# Patient Record
Sex: MALE | Race: WHITE | NOT HISPANIC OR LATINO | Employment: OTHER | URBAN - METROPOLITAN AREA
[De-identification: names, ages, dates, MRNs, and addresses within clinical notes are randomized per-mention and may not be internally consistent; named-entity substitution may affect disease eponyms.]

---

## 2022-10-13 ENCOUNTER — HOSPITAL ENCOUNTER (OUTPATIENT)
Dept: RADIOLOGY | Facility: MEDICAL CENTER | Age: 62
End: 2022-10-13

## 2022-10-13 ENCOUNTER — APPOINTMENT (OUTPATIENT)
Dept: RADIOLOGY | Facility: MEDICAL CENTER | Age: 62
DRG: 141 | End: 2022-10-13
Attending: SURGERY
Payer: COMMERCIAL

## 2022-10-13 ENCOUNTER — APPOINTMENT (OUTPATIENT)
Dept: RADIOLOGY | Facility: MEDICAL CENTER | Age: 62
DRG: 141 | End: 2022-10-13
Attending: NURSE PRACTITIONER
Payer: COMMERCIAL

## 2022-10-13 ENCOUNTER — HOSPITAL ENCOUNTER (INPATIENT)
Facility: MEDICAL CENTER | Age: 62
LOS: 21 days | DRG: 141 | End: 2022-11-03
Attending: EMERGENCY MEDICINE | Admitting: SURGERY
Payer: COMMERCIAL

## 2022-10-13 ENCOUNTER — APPOINTMENT (OUTPATIENT)
Dept: RADIOLOGY | Facility: MEDICAL CENTER | Age: 62
DRG: 141 | End: 2022-10-13
Attending: EMERGENCY MEDICINE
Payer: COMMERCIAL

## 2022-10-13 DIAGNOSIS — S02.2XXA CLOSED FRACTURE OF NASAL BONE, INITIAL ENCOUNTER: ICD-10-CM

## 2022-10-13 DIAGNOSIS — S62.102A WRIST FRACTURE, BILATERAL, CLOSED, INITIAL ENCOUNTER: ICD-10-CM

## 2022-10-13 DIAGNOSIS — S02.85XA CLOSED FRACTURE OF ORBITAL WALL, INITIAL ENCOUNTER (HCC): ICD-10-CM

## 2022-10-13 DIAGNOSIS — S12.290A: Primary | ICD-10-CM

## 2022-10-13 DIAGNOSIS — S12.200A: ICD-10-CM

## 2022-10-13 DIAGNOSIS — S62.101A WRIST FRACTURE, BILATERAL, CLOSED, INITIAL ENCOUNTER: ICD-10-CM

## 2022-10-13 DIAGNOSIS — S02.92XA MULTIPLE FACIAL FRACTURES, CLOSED, INITIAL ENCOUNTER (HCC): ICD-10-CM

## 2022-10-13 PROBLEM — S52.501A CLOSED FRACTURE DISTAL RADIUS AND ULNA, RIGHT, INITIAL ENCOUNTER: Status: ACTIVE | Noted: 2022-10-13

## 2022-10-13 PROBLEM — S52.601A CLOSED FRACTURE DISTAL RADIUS AND ULNA, RIGHT, INITIAL ENCOUNTER: Status: ACTIVE | Noted: 2022-10-13

## 2022-10-13 PROBLEM — S01.511A LIP LACERATION: Status: ACTIVE | Noted: 2022-10-13

## 2022-10-13 PROBLEM — T14.90XA TRAUMA: Status: ACTIVE | Noted: 2022-10-13

## 2022-10-13 PROBLEM — Z53.09 CONTRAINDICATION TO DEEP VEIN THROMBOSIS (DVT) PROPHYLAXIS: Status: ACTIVE | Noted: 2022-10-13

## 2022-10-13 PROBLEM — S63.259A FINGER DISLOCATION, INITIAL ENCOUNTER: Status: ACTIVE | Noted: 2022-10-13

## 2022-10-13 PROBLEM — S52.502A TRAUMATIC CLOSED DISPLACED FRACTURE OF DISTAL END OF RADIUS AND ULNA, LEFT, INITIAL ENCOUNTER: Status: ACTIVE | Noted: 2022-10-13

## 2022-10-13 PROBLEM — S52.602A TRAUMATIC CLOSED DISPLACED FRACTURE OF DISTAL END OF RADIUS AND ULNA, LEFT, INITIAL ENCOUNTER: Status: ACTIVE | Noted: 2022-10-13

## 2022-10-13 PROCEDURE — 99152 MOD SED SAME PHYS/QHP 5/>YRS: CPT

## 2022-10-13 PROCEDURE — 94799 UNLISTED PULMONARY SVC/PX: CPT

## 2022-10-13 PROCEDURE — 96375 TX/PRO/DX INJ NEW DRUG ADDON: CPT

## 2022-10-13 PROCEDURE — 0PSJXZZ REPOSITION LEFT RADIUS, EXTERNAL APPROACH: ICD-10-PCS | Performed by: EMERGENCY MEDICINE

## 2022-10-13 PROCEDURE — 302214 INTUBATION BOX: Performed by: EMERGENCY MEDICINE

## 2022-10-13 PROCEDURE — 29125 APPL SHORT ARM SPLINT STATIC: CPT

## 2022-10-13 PROCEDURE — 70498 CT ANGIOGRAPHY NECK: CPT

## 2022-10-13 PROCEDURE — 700105 HCHG RX REV CODE 258: Performed by: SPECIALIST

## 2022-10-13 PROCEDURE — 700111 HCHG RX REV CODE 636 W/ 250 OVERRIDE (IP): Performed by: SPECIALIST

## 2022-10-13 PROCEDURE — 99153 MOD SED SAME PHYS/QHP EA: CPT

## 2022-10-13 PROCEDURE — 99292 CRITICAL CARE ADDL 30 MIN: CPT | Performed by: SURGERY

## 2022-10-13 PROCEDURE — 305948 HCHG GREEN TRAUMA ACT PRE-NOTIFY NO CC

## 2022-10-13 PROCEDURE — 700111 HCHG RX REV CODE 636 W/ 250 OVERRIDE (IP): Performed by: EMERGENCY MEDICINE

## 2022-10-13 PROCEDURE — 700111 HCHG RX REV CODE 636 W/ 250 OVERRIDE (IP)

## 2022-10-13 PROCEDURE — 99291 CRITICAL CARE FIRST HOUR: CPT | Performed by: SURGERY

## 2022-10-13 PROCEDURE — 73200 CT UPPER EXTREMITY W/O DYE: CPT | Mod: LT

## 2022-10-13 PROCEDURE — 700101 HCHG RX REV CODE 250: Performed by: SPECIALIST

## 2022-10-13 PROCEDURE — 73200 CT UPPER EXTREMITY W/O DYE: CPT | Mod: RT

## 2022-10-13 PROCEDURE — 25605 CLTX DST RDL FX/EPHYS SEP W/: CPT

## 2022-10-13 PROCEDURE — 304217 HCHG IRRIGATION SYSTEM

## 2022-10-13 PROCEDURE — 770001 HCHG ROOM/CARE - MED/SURG/GYN PRIV*

## 2022-10-13 PROCEDURE — 0PSHXZZ REPOSITION RIGHT RADIUS, EXTERNAL APPROACH: ICD-10-PCS | Performed by: EMERGENCY MEDICINE

## 2022-10-13 PROCEDURE — 99285 EMERGENCY DEPT VISIT HI MDM: CPT

## 2022-10-13 PROCEDURE — 302874 HCHG BANDAGE ACE 2 OR 3""

## 2022-10-13 PROCEDURE — 303747 HCHG EXTRA SUTURE

## 2022-10-13 PROCEDURE — 0JQ10ZZ REPAIR FACE SUBCUTANEOUS TISSUE AND FASCIA, OPEN APPROACH: ICD-10-PCS | Performed by: EMERGENCY MEDICINE

## 2022-10-13 PROCEDURE — 304999 HCHG REPAIR-SIMPLE/INTERMED LEVEL 1

## 2022-10-13 PROCEDURE — 96374 THER/PROPH/DIAG INJ IV PUSH: CPT

## 2022-10-13 PROCEDURE — 700117 HCHG RX CONTRAST REV CODE 255: Performed by: NURSE PRACTITIONER

## 2022-10-13 RX ORDER — PROPOFOL 10 MG/ML
200 INJECTION, EMULSION INTRAVENOUS ONCE
Status: DISPENSED | OUTPATIENT
Start: 2022-10-13 | End: 2022-10-14

## 2022-10-13 RX ORDER — SODIUM CHLORIDE, SODIUM LACTATE, POTASSIUM CHLORIDE, CALCIUM CHLORIDE 600; 310; 30; 20 MG/100ML; MG/100ML; MG/100ML; MG/100ML
INJECTION, SOLUTION INTRAVENOUS CONTINUOUS
Status: DISCONTINUED | OUTPATIENT
Start: 2022-10-13 | End: 2022-10-22

## 2022-10-13 RX ORDER — ONDANSETRON 4 MG/1
4 TABLET, ORALLY DISINTEGRATING ORAL EVERY 4 HOURS PRN
Status: DISCONTINUED | OUTPATIENT
Start: 2022-10-13 | End: 2022-11-03 | Stop reason: HOSPADM

## 2022-10-13 RX ORDER — PROPOFOL 10 MG/ML
INJECTION, EMULSION INTRAVENOUS
Status: COMPLETED | OUTPATIENT
Start: 2022-10-13 | End: 2022-10-13

## 2022-10-13 RX ORDER — AMOXICILLIN 250 MG
1 CAPSULE ORAL
Status: DISCONTINUED | OUTPATIENT
Start: 2022-10-13 | End: 2022-11-03 | Stop reason: HOSPADM

## 2022-10-13 RX ORDER — ENEMA 19; 7 G/133ML; G/133ML
1 ENEMA RECTAL
Status: DISCONTINUED | OUTPATIENT
Start: 2022-10-13 | End: 2022-11-03 | Stop reason: HOSPADM

## 2022-10-13 RX ORDER — POLYETHYLENE GLYCOL 3350 17 G/17G
1 POWDER, FOR SOLUTION ORAL 2 TIMES DAILY
Status: DISCONTINUED | OUTPATIENT
Start: 2022-10-13 | End: 2022-11-03 | Stop reason: HOSPADM

## 2022-10-13 RX ORDER — HYDRALAZINE HYDROCHLORIDE 20 MG/ML
10 INJECTION INTRAMUSCULAR; INTRAVENOUS EVERY 4 HOURS PRN
Status: DISCONTINUED | OUTPATIENT
Start: 2022-10-13 | End: 2022-11-03 | Stop reason: HOSPADM

## 2022-10-13 RX ORDER — OXYCODONE HYDROCHLORIDE 10 MG/1
10 TABLET ORAL
Status: DISCONTINUED | OUTPATIENT
Start: 2022-10-13 | End: 2022-11-03 | Stop reason: HOSPADM

## 2022-10-13 RX ORDER — ACETAMINOPHEN 325 MG/1
650 TABLET ORAL EVERY 4 HOURS PRN
Status: DISCONTINUED | OUTPATIENT
Start: 2022-10-13 | End: 2022-10-18

## 2022-10-13 RX ORDER — AMOXICILLIN 250 MG
1 CAPSULE ORAL NIGHTLY
Status: DISCONTINUED | OUTPATIENT
Start: 2022-10-13 | End: 2022-11-03 | Stop reason: HOSPADM

## 2022-10-13 RX ORDER — ONDANSETRON 2 MG/ML
4 INJECTION INTRAMUSCULAR; INTRAVENOUS ONCE
Status: COMPLETED | OUTPATIENT
Start: 2022-10-13 | End: 2022-10-13

## 2022-10-13 RX ORDER — ONDANSETRON 2 MG/ML
4 INJECTION INTRAMUSCULAR; INTRAVENOUS EVERY 4 HOURS PRN
Status: DISCONTINUED | OUTPATIENT
Start: 2022-10-13 | End: 2022-11-03 | Stop reason: HOSPADM

## 2022-10-13 RX ORDER — ONDANSETRON 2 MG/ML
INJECTION INTRAMUSCULAR; INTRAVENOUS
Status: COMPLETED
Start: 2022-10-13 | End: 2022-10-13

## 2022-10-13 RX ORDER — ACETAMINOPHEN 325 MG/1
650 TABLET ORAL EVERY 6 HOURS PRN
Status: DISCONTINUED | OUTPATIENT
Start: 2022-10-18 | End: 2022-11-03 | Stop reason: HOSPADM

## 2022-10-13 RX ORDER — BISACODYL 10 MG
10 SUPPOSITORY, RECTAL RECTAL
Status: DISCONTINUED | OUTPATIENT
Start: 2022-10-13 | End: 2022-11-03 | Stop reason: HOSPADM

## 2022-10-13 RX ORDER — ACETAMINOPHEN 325 MG/1
650 TABLET ORAL EVERY 6 HOURS
Status: DISPENSED | OUTPATIENT
Start: 2022-10-13 | End: 2022-10-18

## 2022-10-13 RX ORDER — DOCUSATE SODIUM 100 MG/1
100 CAPSULE, LIQUID FILLED ORAL 2 TIMES DAILY
Status: DISCONTINUED | OUTPATIENT
Start: 2022-10-13 | End: 2022-11-03 | Stop reason: HOSPADM

## 2022-10-13 RX ORDER — BACITRACIN ZINC AND POLYMYXIN B SULFATE 500; 1000 [USP'U]/G; [USP'U]/G
OINTMENT TOPICAL 3 TIMES DAILY
Status: DISPENSED | OUTPATIENT
Start: 2022-10-13 | End: 2022-10-20

## 2022-10-13 RX ORDER — ACETAMINOPHEN 650 MG/1
650 SUPPOSITORY RECTAL EVERY 4 HOURS PRN
Status: DISCONTINUED | OUTPATIENT
Start: 2022-10-13 | End: 2022-10-18

## 2022-10-13 RX ORDER — HYDROMORPHONE HYDROCHLORIDE 1 MG/ML
0.5 INJECTION, SOLUTION INTRAMUSCULAR; INTRAVENOUS; SUBCUTANEOUS
Status: DISCONTINUED | OUTPATIENT
Start: 2022-10-13 | End: 2022-10-23

## 2022-10-13 RX ORDER — GABAPENTIN 100 MG/1
100 CAPSULE ORAL EVERY 8 HOURS
Status: DISCONTINUED | OUTPATIENT
Start: 2022-10-13 | End: 2022-11-03 | Stop reason: HOSPADM

## 2022-10-13 RX ORDER — METAXALONE 800 MG/1
400 TABLET ORAL 3 TIMES DAILY
Status: DISCONTINUED | OUTPATIENT
Start: 2022-10-13 | End: 2022-11-03 | Stop reason: HOSPADM

## 2022-10-13 RX ORDER — OXYCODONE HYDROCHLORIDE 5 MG/1
5 TABLET ORAL
Status: DISCONTINUED | OUTPATIENT
Start: 2022-10-13 | End: 2022-11-03 | Stop reason: HOSPADM

## 2022-10-13 RX ADMIN — PROPOFOL 30 MG: 10 INJECTION, EMULSION INTRAVENOUS at 20:24

## 2022-10-13 RX ADMIN — ONDANSETRON 4 MG: 2 INJECTION INTRAMUSCULAR; INTRAVENOUS at 19:45

## 2022-10-13 RX ADMIN — HYDROMORPHONE HYDROCHLORIDE 0.5 MG: 1 INJECTION, SOLUTION INTRAMUSCULAR; INTRAVENOUS; SUBCUTANEOUS at 23:31

## 2022-10-13 RX ADMIN — SODIUM CHLORIDE, POTASSIUM CHLORIDE, SODIUM LACTATE AND CALCIUM CHLORIDE: 600; 310; 30; 20 INJECTION, SOLUTION INTRAVENOUS at 23:30

## 2022-10-13 RX ADMIN — PROPOFOL 30 MG: 10 INJECTION, EMULSION INTRAVENOUS at 20:27

## 2022-10-13 RX ADMIN — Medication: at 20:30

## 2022-10-13 RX ADMIN — PROPOFOL 40 MG: 10 INJECTION, EMULSION INTRAVENOUS at 22:07

## 2022-10-13 RX ADMIN — IOHEXOL 70 ML: 350 INJECTION, SOLUTION INTRAVENOUS at 21:44

## 2022-10-13 ASSESSMENT — FIBROSIS 4 INDEX: FIB4 SCORE: 1.38

## 2022-10-13 ASSESSMENT — COPD QUESTIONNAIRES
DURING THE PAST 4 WEEKS HOW MUCH DID YOU FEEL SHORT OF BREATH: NONE/LITTLE OF THE TIME
DO YOU EVER COUGH UP ANY MUCUS OR PHLEGM?: NO/ONLY WITH OCCASIONAL COLDS OR INFECTIONS
COPD SCREENING SCORE: 4
HAVE YOU SMOKED AT LEAST 100 CIGARETTES IN YOUR ENTIRE LIFE: YES

## 2022-10-14 LAB
ANION GAP SERPL CALC-SCNC: 10 MMOL/L (ref 7–16)
BASOPHILS # BLD AUTO: 0.2 % (ref 0–1.8)
BASOPHILS # BLD: 0.03 K/UL (ref 0–0.12)
BUN SERPL-MCNC: 15 MG/DL (ref 8–22)
CALCIUM SERPL-MCNC: 8.5 MG/DL (ref 8.5–10.5)
CHLORIDE SERPL-SCNC: 104 MMOL/L (ref 96–112)
CO2 SERPL-SCNC: 24 MMOL/L (ref 20–33)
CREAT SERPL-MCNC: 0.71 MG/DL (ref 0.5–1.4)
EOSINOPHIL # BLD AUTO: 0.01 K/UL (ref 0–0.51)
EOSINOPHIL NFR BLD: 0.1 % (ref 0–6.9)
ERYTHROCYTE [DISTWIDTH] IN BLOOD BY AUTOMATED COUNT: 41 FL (ref 35.9–50)
GFR SERPLBLD CREATININE-BSD FMLA CKD-EPI: 103 ML/MIN/1.73 M 2
GLUCOSE SERPL-MCNC: 131 MG/DL (ref 65–99)
HCT VFR BLD AUTO: 40 % (ref 42–52)
HGB BLD-MCNC: 13.8 G/DL (ref 14–18)
IMM GRANULOCYTES # BLD AUTO: 0.07 K/UL (ref 0–0.11)
IMM GRANULOCYTES NFR BLD AUTO: 0.5 % (ref 0–0.9)
LYMPHOCYTES # BLD AUTO: 1.56 K/UL (ref 1–4.8)
LYMPHOCYTES NFR BLD: 10 % (ref 22–41)
MCH RBC QN AUTO: 30.3 PG (ref 27–33)
MCHC RBC AUTO-ENTMCNC: 34.5 G/DL (ref 33.7–35.3)
MCV RBC AUTO: 87.7 FL (ref 81.4–97.8)
MONOCYTES # BLD AUTO: 1.49 K/UL (ref 0–0.85)
MONOCYTES NFR BLD AUTO: 9.6 % (ref 0–13.4)
NEUTROPHILS # BLD AUTO: 12.38 K/UL (ref 1.82–7.42)
NEUTROPHILS NFR BLD: 79.6 % (ref 44–72)
NRBC # BLD AUTO: 0 K/UL
NRBC BLD-RTO: 0 /100 WBC
PLATELET # BLD AUTO: 291 K/UL (ref 164–446)
PMV BLD AUTO: 9.4 FL (ref 9–12.9)
POTASSIUM SERPL-SCNC: 3.9 MMOL/L (ref 3.6–5.5)
RBC # BLD AUTO: 4.56 M/UL (ref 4.7–6.1)
SODIUM SERPL-SCNC: 138 MMOL/L (ref 135–145)
WBC # BLD AUTO: 15.5 K/UL (ref 4.8–10.8)

## 2022-10-14 PROCEDURE — 96376 TX/PRO/DX INJ SAME DRUG ADON: CPT

## 2022-10-14 PROCEDURE — 36415 COLL VENOUS BLD VENIPUNCTURE: CPT

## 2022-10-14 PROCEDURE — 770001 HCHG ROOM/CARE - MED/SURG/GYN PRIV*

## 2022-10-14 PROCEDURE — 700105 HCHG RX REV CODE 258: Performed by: SPECIALIST

## 2022-10-14 PROCEDURE — A9270 NON-COVERED ITEM OR SERVICE: HCPCS | Performed by: SPECIALIST

## 2022-10-14 PROCEDURE — 700102 HCHG RX REV CODE 250 W/ 637 OVERRIDE(OP): Performed by: SPECIALIST

## 2022-10-14 PROCEDURE — 80048 BASIC METABOLIC PNL TOTAL CA: CPT

## 2022-10-14 PROCEDURE — 85025 COMPLETE CBC W/AUTO DIFF WBC: CPT

## 2022-10-14 PROCEDURE — 99254 IP/OBS CNSLTJ NEW/EST MOD 60: CPT | Mod: 57 | Performed by: ORTHOPAEDIC SURGERY

## 2022-10-14 PROCEDURE — 700101 HCHG RX REV CODE 250: Performed by: SPECIALIST

## 2022-10-14 PROCEDURE — 700111 HCHG RX REV CODE 636 W/ 250 OVERRIDE (IP): Performed by: SPECIALIST

## 2022-10-14 RX ORDER — SODIUM CHLORIDE 9 MG/ML
500 INJECTION, SOLUTION INTRAVENOUS
Status: DISCONTINUED | OUTPATIENT
Start: 2022-10-14 | End: 2022-10-14

## 2022-10-14 RX ORDER — ENOXAPARIN SODIUM 100 MG/ML
30 INJECTION SUBCUTANEOUS EVERY 12 HOURS
Status: DISCONTINUED | OUTPATIENT
Start: 2022-10-15 | End: 2022-11-03 | Stop reason: HOSPADM

## 2022-10-14 RX ADMIN — ONDANSETRON 4 MG: 2 INJECTION INTRAMUSCULAR; INTRAVENOUS at 05:12

## 2022-10-14 RX ADMIN — ACETAMINOPHEN 650 MG: 325 TABLET, FILM COATED ORAL at 18:09

## 2022-10-14 RX ADMIN — ONDANSETRON 4 MG: 2 INJECTION INTRAMUSCULAR; INTRAVENOUS at 14:52

## 2022-10-14 RX ADMIN — ACETAMINOPHEN 650 MG: 325 TABLET, FILM COATED ORAL at 23:23

## 2022-10-14 RX ADMIN — METAXALONE 400 MG: 800 TABLET ORAL at 18:09

## 2022-10-14 RX ADMIN — Medication: at 12:00

## 2022-10-14 RX ADMIN — SENNOSIDES AND DOCUSATE SODIUM 1 TABLET: 50; 8.6 TABLET ORAL at 20:12

## 2022-10-14 RX ADMIN — SODIUM CHLORIDE, POTASSIUM CHLORIDE, SODIUM LACTATE AND CALCIUM CHLORIDE: 600; 310; 30; 20 INJECTION, SOLUTION INTRAVENOUS at 20:12

## 2022-10-14 RX ADMIN — OXYCODONE HYDROCHLORIDE 10 MG: 10 TABLET ORAL at 20:12

## 2022-10-14 RX ADMIN — HYDROMORPHONE HYDROCHLORIDE 0.5 MG: 1 INJECTION, SOLUTION INTRAMUSCULAR; INTRAVENOUS; SUBCUTANEOUS at 14:52

## 2022-10-14 RX ADMIN — Medication 1 EACH: at 18:10

## 2022-10-14 RX ADMIN — ONDANSETRON 4 MG: 2 INJECTION INTRAMUSCULAR; INTRAVENOUS at 02:09

## 2022-10-14 RX ADMIN — HYDROMORPHONE HYDROCHLORIDE 0.5 MG: 1 INJECTION, SOLUTION INTRAMUSCULAR; INTRAVENOUS; SUBCUTANEOUS at 05:07

## 2022-10-14 RX ADMIN — GABAPENTIN 100 MG: 100 CAPSULE ORAL at 20:13

## 2022-10-14 RX ADMIN — HYDROMORPHONE HYDROCHLORIDE 0.5 MG: 1 INJECTION, SOLUTION INTRAMUSCULAR; INTRAVENOUS; SUBCUTANEOUS at 10:37

## 2022-10-14 RX ADMIN — ONDANSETRON 4 MG: 2 INJECTION INTRAMUSCULAR; INTRAVENOUS at 10:37

## 2022-10-14 RX ADMIN — OXYCODONE 5 MG: 5 TABLET ORAL at 23:24

## 2022-10-14 ASSESSMENT — PATIENT HEALTH QUESTIONNAIRE - PHQ9
1. LITTLE INTEREST OR PLEASURE IN DOING THINGS: NOT AT ALL
2. FEELING DOWN, DEPRESSED, IRRITABLE, OR HOPELESS: NOT AT ALL
SUM OF ALL RESPONSES TO PHQ9 QUESTIONS 1 AND 2: 0

## 2022-10-14 ASSESSMENT — LIFESTYLE VARIABLES
HAVE YOU EVER FELT YOU SHOULD CUT DOWN ON YOUR DRINKING: NO
TOTAL SCORE: 0
HOW MANY TIMES IN THE PAST YEAR HAVE YOU HAD 5 OR MORE DRINKS IN A DAY: 0
TOTAL SCORE: 0
ON A TYPICAL DAY WHEN YOU DRINK ALCOHOL HOW MANY DRINKS DO YOU HAVE: 2
AVERAGE NUMBER OF DAYS PER WEEK YOU HAVE A DRINK CONTAINING ALCOHOL: 1
HAVE PEOPLE ANNOYED YOU BY CRITICIZING YOUR DRINKING: NO
TOTAL SCORE: 0
DOES PATIENT WANT TO STOP DRINKING: NO
ALCOHOL_USE: YES
EVER FELT BAD OR GUILTY ABOUT YOUR DRINKING: NO
EVER HAD A DRINK FIRST THING IN THE MORNING TO STEADY YOUR NERVES TO GET RID OF A HANGOVER: NO
CONSUMPTION TOTAL: NEGATIVE

## 2022-10-14 ASSESSMENT — ENCOUNTER SYMPTOMS
ABDOMINAL PAIN: 0
MYALGIAS: 1
NECK PAIN: 1
NAUSEA: 0
VOMITING: 0
SHORTNESS OF BREATH: 0
NEUROLOGICAL NEGATIVE: 1
SINUS PAIN: 1
BACK PAIN: 0
FEVER: 0
CHILLS: 0

## 2022-10-14 ASSESSMENT — PAIN DESCRIPTION - PAIN TYPE
TYPE: ACUTE PAIN

## 2022-10-14 NOTE — PROGRESS NOTES
C-spine fx  Facial fxs  B intraarticular distal radius fxs  Recommend CR right wrist, splints bilateral  CT B wrists for preop planning  Will discuss with patient in AM  Likely ORIF next week with hand team

## 2022-10-14 NOTE — PROGRESS NOTES
Full note dictated  Bilateral lefort fractures with malocclusion  Will need operative repair/treatment  Planning on Monday later in the day  Pt and pt's brother have had their questions answered

## 2022-10-14 NOTE — ASSESSMENT & PLAN NOTE
Prophylactic anticoagulation for thrombotic prevention initially contraindicated secondary to elevated bleeding risk.  10/14 Prophylactic dose enoxaparin initiated.

## 2022-10-14 NOTE — ASSESSMENT & PLAN NOTE
Comminuted dorsally angulated intra-articular distal radius fracture with impaction.    Mildly distracted fracture of the base of the ulnar styloid.  Splinted at referring facility.  Reduced in ED with conscious sedation.  CT bilateral wrists completed.  10/18 ORIF distal radius.  Weight bearing status - Platform weightbearing RUE. ADL's ok with hands under 5 lbs.  11/1 plan for suture/staple removal.  Te Comer MD. Orthopedic Surgeon. [facility].

## 2022-10-14 NOTE — PROGRESS NOTES
Assumed care of patient at 0645. Bedside report received. Assessment complete.  AA&Ox4. Denies CP/SOB.  Reporting 8/10 pain. Medicated per MAR.   Educated patient regarding pharmacologic and non pharmacologic modalities for pain management.  Skin per flowsheets  NPO sips w Meds. Denies N/V.  + void via Barajas Last BM PTA  Pt ambulates SBA.  All needs met at this time. Call light within reach. Pt calls appropriately. Bed low and locked, non skid socks in place. Hourly rounding in place.

## 2022-10-14 NOTE — CARE PLAN
Problem: Knowledge Deficit - Standard  Goal: Patient and family/care givers will demonstrate understanding of plan of care, disease process/condition, diagnostic tests and medications  Outcome: Progressing     Problem: Pain - Standard  Goal: Alleviation of pain or a reduction in pain to the patient’s comfort goal  Outcome: Progressing     Problem: Fall Risk  Goal: Patient will remain free from falls  Outcome: Progressing   The patient is Stable - Low risk of patient condition declining or worsening    Shift Goals  Clinical Goals: Monitor O2 Saturation  Patient Goals: Rest  Family Goals: N/A    Progress made toward(s) clinical / shift goals:  Suction at bedside, Patient on 0.5 Liters O2 Mask     Patient is not progressing towards the following goals:

## 2022-10-14 NOTE — PROGRESS NOTES
"Patient admitted to room T 433 via transport in [facility] from ER at 1200 .  Pt BP (!) 151/90   Pulse 69   Temp 36.9 °C (98.5 °F) (Temporal)   Resp 16   Ht 1.803 m (5' 11\")   Wt 83.9 kg (185 lb)   SpO2 96%   BMI 25.80 kg/m²     Patient reports pain at 7 on a scale of 0-10. Educated patient regarding pharmacologic and non pharmacologic modalities for pain management. Oriented to room call light and smoking policy.  Reviewed plan of care (equipment, incentive spirometer, sequential compression devices, medications, activity, diet, fall precautions, skin care, and pain) with patient and family. Welcome packet given and reviewed with patient, all questions answered. Education provided on oral hygiene program.    AA&Ox4. Denies CP/SOB.  See 2 RN skin note  NPO w Sips w Medications at this time. Denies N/V.  + void via Barajas Last BM PTA  Pt unable to ambulate at this time.  All needs met at this time. Call light within reach. Pt calls appropriately. Bed low and locked, non skid socks in place. Hourly rounding in place.    "

## 2022-10-14 NOTE — ED NOTES
Med Rec complete per patient   No oral antibiotics in the last 30 days  Allergies reviewed  Patient denies taking any prescription medications

## 2022-10-14 NOTE — PROGRESS NOTES
4 Eyes Skin Assessment Completed by JAIME Julian and Aggie RN.    Head Generalized Bruising and Wounds to Head and Nose, Stitching to upper Lip  Ears Redness and Blanching  Nose Generalized Abrasions   Mouth Bleeding  Neck HERIBERTO Cervical Collar In place  Breast/Chest WDL  Shoulder Blades WDL  Spine WDL  (R) Arm/Elbow/Hand HERIBERTO Hard Cast in place   (L) Arm/Elbow/Hand HERIBERTO Hard Cast in place  Abdomen WDL  Groin Barajas Catheter in place, Bleeding to Insertion Site   Scrotum/Coccyx/Buttocks Redness and Blanching  (R) Leg Abrasion to R Thigh  (L) Leg WDL  (R) Heel/Foot/Toe Redness and Blanching  (L) Heel/Foot/Toe Redness and Blanching          Devices In Places Blood Pressure Cuff, Pulse Ox, Barajas, SCD's, Oxy Mask, and Cervical Collar, Bilateral Arm Casts       Interventions In Place Gray Ear Foams, Pillows, Heels Loaded W/Pillows, and Pressure Redistribution Mattress    Possible Skin Injury No    Pictures Uploaded Into Epic N/A  Wound Consult Placed N/A  RN Wound Prevention Protocol Ordered No

## 2022-10-14 NOTE — ASSESSMENT & PLAN NOTE
Fracture of the anterior inferior corner of the C3 vertebral body with slight anterior displacement approximately 3 mm.  Equivocal nondisplaced fracture line extending to superior endplate of the vertebral body.  Left C3-4 facet mildly widened with posterior displacement of C3 facet in relation to C4.  CTA neck within normal limits.  Non-operative management.   Cervical immobilization.   Follow up in clinic 6 weeks with AP / lateral cervical spine xrays.  Garrett Edgar MD, PhD. Neurosurgeon. [facility].

## 2022-10-14 NOTE — ED NOTES
Pt cleaned after vomiting. New C-collar in place, new gown and blankets. Pt has suction secured to arm and is able to self-suction as needed. Pt and family told to utilize call light and call light within reach of the pt/family.

## 2022-10-14 NOTE — ASSESSMENT & PLAN NOTE
Comminuted intra-articular distal radius fracture on left.    Significant displacement of large radial sided fracture fragment.  Comminuted fracture of distal ulna.  Splinted at referring facility.  Reduced in ED with conscious sedation.  CT bilateral wrists completed.  10/17 ORIF distal radius.  Weight bearing status - Platform weightbearing LUE. ADL's ok with hands under 5 lbs.  11/1 plan for suture/staple removal.   Te Comer MD. Orthopedic Surgeon. [facility]. and Deyvi Ocampo MD. Orthopedic Surgeon. [facility].

## 2022-10-14 NOTE — CONSULTS
Neurosurgery Consult   10/13/2022 called 1909 images reviewed plan discussed with ER MD 1911  Referring MD:  Dr. Miranda  Reason for referral:  anterior inferior C3 chip fracture        CHIEF COMPLAINT  C3 anterior inferior chip fracture     HPI  Leobardo Young is a 62 y.o. who [place] he fell forward downhill onto rocks and hit his face. The patient denies loss of consciousness. He immediately developed moderate-severe, sharp, non-radiating right wrist pain for which he called EMS. He wsa evalauted in the ER and noted to have no neurologic deficit.  He has multiple facial and orthopedic fractures for which he is being admitted for.      REVIEW OF SYSTEMS  Constitutional:  Denies fever or chills   Eyes:  Denies vision changes  HENT:  Denies nasal congestion or difficulty swallowing  Respiratory:  Denies cough or shortness of breath   Cardiovascular:  Denies chest pain  GI:  Per HPI.   :  Denies dysuria   Musculoskeletal:  Per HPI.   Integument:  Denies rash   Neurologic:  Denies headache, focal weakness or syncope     Hematologic:  No abnormal bleeding history     PAST MEDICAL HISTORY  Past Medical History   History reviewed. No pertinent past medical history.        FAMILY HISTORY  Family History   History reviewed. No pertinent family history.        SOCIAL HISTORY  Social History               Socioeconomic History    Marital status: Single   Tobacco Use    Smoking status: Former       Types: Cigarettes    Smokeless tobacco: Never   Vaping Use    Vaping Use: Never used   Substance and Sexual Activity    Alcohol use: Yes       Comment: occasional    Drug use: Yes       Comment: marijuana edibles            SURGICAL HISTORY  Past Surgical History   History reviewed. No pertinent surgical history.        CURRENT MEDICATIONS     Current Facility-Administered Medications:     HYDROmorphone (DILAUDID) injection 1 mg, 1 mg, Intravenous, Once, Jagdish Hernandez M.D.     "tetanus-diphth-acell pertussis (BOOSTRIX (Ages 7 & Older)) inj 0.5 mL, 0.5 mL, Intramuscular, Once, Jagdish Hernandez M.D.  No current outpatient medications on file.        ALLERGIES  No Known Allergies     PHYSICAL EXAM  VITAL SIGNS: BP (!) 157/93   Pulse 85   Temp 36 °C (96.8 °F) (Temporal)   Resp 20   Ht 1.791 m (5' 10.5\")   Wt 83.9 kg (184 lb 15.5 oz)   SpO2 88%   BMI 26.16 kg/m²   Awake alert   Exam limited by mutiple orthopedic fractures, no obvious neurologic deficit        RADIOLOGY/PROCEDURES  Extensive facial fractures which involve the pterygoid plates compatible with LeFort injury. No acute intracranial abnormality. Flexion teardrop fracture of C3. Dorsal dislocation of the PIP joint of the right middle finger. Comminuted distal radius and ulnar fractures to right wrist. Comminuted displaced left distal radial fracture.  Left ulnar styloid fracture.    AP:  62 year old  male with anterior inferior chip fracture C3 with good alignment, no neurologic deficit.  Recommend rigid cervical orthosis.   He can follow up in clinic 6 weeks with ap lateral cervical spine xrays  Neurosurgery will sign off.  Please call with any questions  "

## 2022-10-14 NOTE — ED NOTES
Patient repositioned in [facility]. Pillows placed under arms to assist with elevating them. Ice packs applied as well.

## 2022-10-14 NOTE — ED PROVIDER NOTES
ED Provider Note    Scribed for Jaspal Miranda by Roxane Brar. 10/13/2022  6:07 PM    Primary care provider: Pcp Not In Computer  Means of arrival: EMS  History obtained from: Patient  History limited by: None    CHIEF COMPLAINT  Chief Complaint   Patient presents with    Trauma Green     Transfer from [place] on [facility] after patient fell onto rocks while hiking today. Facial fx, bilateral wrist fx, C3 fx. 2L on oxy mask     HPI  Leobardo Young is a 62 y.o. male who presents to the Emergency Department via EMS to trauma bay as a trauma green transfer from [place], following a ground level fall while hiking earlier today. The patient recalls tripping over a branch, and falling face first, striking the front of his face on a rock. He presents with multiple facial fractures and a C3 fracture found on imaging at outside facility. He did not lose consciousness, but he does present complaining of bilateral wrist pain, neck pain, and facial pain. The patient is otherwise healthy, takes no daily medications, and is not on any blood thinners. He denies any back pain, chest pain, or shortness of breath. En route, he was placed on 2 L O2, administered tetanus, and Fentanyl for pain control.   Quality:Trauma  Duration: many hours  Severity: moderate to severe  Associated sx: bilateral wrist pain, facial pain, and neck pain    REVIEW OF SYSTEMS  As above, all other systems reviewed and are negative.   See HPI for further details.     PAST MEDICAL HISTORY   None pertinent on chart review  SURGICAL HISTORY  patient denies any surgical history  SOCIAL HISTORY  Social History     Tobacco Use    Smoking status: Former     Types: Cigarettes    Smokeless tobacco: Never   Vaping Use    Vaping Use: Never used   Substance Use Topics    Alcohol use: Yes     Comment: occasional    Drug use: Yes     Comment: marijuana edibles      Social History     Substance and Sexual Activity   Drug Use Yes    Comment: marijuana edibles  "    FAMILY HISTORY  None noted on chart review  CURRENT MEDICATIONS  No current outpatient medications    ALLERGIES  No Known Allergies    PHYSICAL EXAM    VITAL SIGNS:   Vitals:    10/13/22 1800 10/13/22 1801 10/13/22 1843 10/13/22 1900   BP: (!) 174/100  (!) 176/83 (!) 148/79   Pulse: 85  79 68   Resp: 16  (!) 11 12   Temp:       SpO2: 93%  97% 98%   Weight:  83.9 kg (185 lb)     Height:  1.803 m (5' 11\")       Vitals: My interpretation: hypertensive, not tachycardic, afebrile, not hypoxic    Reinterpretation of vitals: Improving    Cardiac Monitor Interpretation: The cardiac monitor revealed normal Sinus Rhythm as interpreted by me. The cardiac monitor was ordered secondary to the patient's history of multitrauma and to monitor for dysrhythmia and/or tachycardia.    PE:   Constitutional: Well developed, Well nourished, No acute distress, Non-toxic appearance.   HENT: 3.5 cm laceration above the right upper lip. no hemotympanum bilaterally. Normocephalic, obvious facial fracture and tenderness throughout the face and deformity. Bilateral external ears normal, Oropharynx is clear mucous membranes are moist. No oral exudates or nasal discharge.   Eyes: Periorbital hematomas bilaterally. Pupils are equal round and reactive, EOMI, Conjunctiva normal, No discharge.   Neck: C-collar in place, minimally tender in the C-spine without step-off or deformity,, Supple, No stridor. No meningismus.  Lymphatic: No lymphadenopathy noted.   Cardiovascular: hypertensive. Regular rate and rhythm without murmur rub or gallop.  Thorax & Lungs: Clear breath sounds bilaterally without wheezes, rhonchi or rales. There is no chest wall tenderness.   Abdomen: Soft non-tender non-distended. There is no rebound or guarding. No organomegaly is appreciated. Bowel sounds are normal.  Skin: Normal without rash.   Back: No CVA or spinal tenderness.   Extremities:  Intact distal pulses, No edema, No tenderness, No cyanosis, No clubbing. Capillary " "refill is less than 2 seconds.  Musculoskeletal: Bilateral wrist deformities, but neurovascularly intact, decreased range of motion secondary to pain. Good range of motion in all major joints. No tenderness to palpation noted.   Neurologic: Alert & oriented x 3, Normal motor function, Normal sensory function, No focal deficits noted. Reflexes are normal.  Psychiatric: Affect normal, Judgment normal, Mood normal. There is no suicidal ideation or patient reported hallucinations.     DIAGNOSTIC STUDIES / PROCEDURES  CONSCIOUS SEDATION PROCEDURE NOTE:  Patient identification was confirmed and patient consent was obtain verbally.  The procedure was conducted at 2024 and performed by Dr. Miranda.  Anesthetic used: Propofol 100 mg  Length of Time: 18 minutes  Other medications administered: N/A  Pre-procedure neuro examination revealed no deficits. Patient's airway remained patent, O2SAT adequate through procedure. Vitals remained stable. Patient tolerated procedure well without complications. Post-procedure exam showed patient w/o cranial deficits. Sensory and motor intact. Patient returned to baseline prior to disposition.  Instructions for care discussed verbally and pt provided with additional written instructions for homecare and f/u.    Fracture Reduction        Performed by: Jaspal Miranda MD      Consent: Verbal consent obtained.      Risks and benefits: risks, benefits and alternatives were discussed      Consent given by: patient and/or guardian      Patient understanding: patient/guardian states understanding of the procedure being performed      Patient consent: the patient/guardian's understanding of the procedure matches consent given      Patient identity confirmed: verbally with patient and arm band      Time out: Immediately prior to procedure a \"time out\" was called to verify the correct patient, procedure, equipment, support staff and site/side marked as required.      Location details: left " "wrist      Reduction Procedure: axial pull, traction and closed manipulation      Results: Post reduction alignement improved      Complication: Tolerated well without complication. Tendons intact and neurovascularly intact post procedure.    Splint Application and Check        Authorized by: Jaspal Miranda       Consent: Verbal consent obtained.      Risks and benefits: risks, benefits and alternatives were discussed      Consent given by: patient      Patient understanding: patient states understanding of the procedure being performed      Patient consent: the patient's understanding of the procedure matches consent given      Patient identity confirmed: verbally with patient and arm band      Time out: Immediately prior to procedure a \"time out\" was called to verify the correct patient, procedure, equipment, support staff and site/side marked as required.      Location details: left wrist      Post-procedure: The splinted body part was neurovascularly unchanged following the procedure.      Patient tolerance: Patient tolerated the procedure well with no immediate complications.  Fracture Reduction        Performed by: Jaspal Miranda MD      Consent: Verbal consent obtained.      Risks and benefits: risks, benefits and alternatives were discussed      Consent given by: patient and/or guardian      Patient understanding: patient/guardian states understanding of the procedure being performed      Patient consent: the patient/guardian's understanding of the procedure matches consent given      Patient identity confirmed: verbally with patient and arm band      Time out: Immediately prior to procedure a \"time out\" was called to verify the correct patient, procedure, equipment, support staff and site/side marked as required.      Location details: right wrist      Reduction Procedure: axial pull, traction and closed manipulation      Results: Post reduction alignement improved      Complication: Tolerated well " "without complication. Tendons intact and neurovascularly intact post procedure.    Splint Application and Check        Authorized by: Jaspal Miranda       Consent: Verbal consent obtained.      Risks and benefits: risks, benefits and alternatives were discussed      Consent given by: patient      Patient understanding: patient states understanding of the procedure being performed      Patient consent: the patient's understanding of the procedure matches consent given      Patient identity confirmed: verbally with patient and arm band      Time out: Immediately prior to procedure a \"time out\" was called to verify the correct patient, procedure, equipment, support staff and site/side marked as required.      Location details: right wrist      Post-procedure: The splinted body part was neurovascularly unchanged following the procedure.      Patient tolerance: Patient tolerated the procedure well with no immediate complications.    Laceration Repair Procedure Note    Indication: Laceration    Procedure: The patient was placed in the appropriate position and anesthesia around the laceration was obtained by infiltration using 0.5% Bupivacaine without epinephrine. The area was then irrigated with normal saline and the skin adjacent to the wound was cleansed with Betadine. The laceration was closed in two layers. The subcutaneous layer was closed with 1, 3-0 Chromic gut using interrupted sutures. The skin was closed with 5, 3-0 Chromic Gut. A total of 6 sutures were placed. The wound area was then dressed with bacitracin.      Total repaired wound length: 3.5 cm.     LABS  Results for orders placed or performed during the hospital encounter of 10/13/22   CBC WITH DIFFERENTIAL   Result Value Ref Range    WBC 24.6 (H) 4.8 - 10.8 K/uL    RBC 5.38 4.70 - 6.10 M/uL    Hemoglobin 16.0 14.0 - 18.0 g/dL    Hematocrit 46.2 42.0 - 52.0 %    MCV 85.9 80.0 - 94.0 fL    MCH 29.7 28.7 - 33.1 pg    MCHC 34.6 33.0 - 37.0 g/dL    RDW " 12.5 11.5 - 14.5 %    Platelet Count 340 130 - 400 K/uL    MPV 9.4 7.4 - 10.4 fL    Neutrophils Automated 84.9 (H) 39.0 - 70.0 %    Lymphocytes Automated 8.7 (L) 21.0 - 50.0 %    Monocytes Automated 5.9 1.7 - 10.0 %    Eosinophils Automated 0.1 0.0 - 5.0 %    Basophils Automated 0.4 0.0 - 3.0 %    Abs Neutrophils Automated 20.9 (H) 1.8 - 7.7 K/uL    Abs Lymph Automated 2.2 1.2 - 4.8 K/uL    Monos (Absolute) 1.5 (H) 0.2 - 0.9 K/uL   COMP METABOLIC PANEL   Result Value Ref Range    Sodium 140 137 - 145 mmol/L    Potassium 3.7 3.5 - 5.1 mmol/L    Chloride 106 98 - 107 mmol/L    Co2 20 (L) 22 - 30 mmol/L    Anion Gap 14 (H) 4 - 12 mmol/L    Glucose 146 (H) 70 - 100 mg/dL    Bun 24 (H) 9 - 20 mg/dL    Creatinine 0.8 0.7 - 1.3 mg/dL    Calcium 9.4 8.7 - 10.5 mg/dL    AST(SGOT) 40 15 - 46 U/L    ALT(SGPT) 29 13 - 69 U/L    Alkaline Phosphatase 72 38 - 126 U/L    Total Bilirubin 0.4 0.2 - 1.3 mg/dL    Albumin 4.7 3.5 - 5.0 g/dL    Total Protein 7.7 6.3 - 8.2 g/dL    A-G Ratio 1.6    ESTIMATED GFR   Result Value Ref Range    GFR (CKD-EPI) 99 >60 mL/min/1.73 m 2      All labs reviewed by me. Significant for leukocytosis of 24,000, no anemia, normal electrolytes, mildly hyperglycemic, creatinine normal, normal liver enzymes, normal bilirubin    RADIOLOGY  OUTSIDE IMAGES-DX PELVIS   Final Result      OUTSIDE IMAGES-DX UPPER EXTREMITY, RIGHT   Final Result      OUTSIDE IMAGES-DX UPPER EXTREMITY, RIGHT   Final Result      OUTSIDE IMAGES-DX UPPER EXTREMITY, LEFT   Final Result      OUTSIDE IMAGES-CT FACE   Final Result      OUTSIDE IMAGES-CT CERVICAL SPINE   Final Result      OUTSIDE IMAGES-CT HEAD   Final Result      OUTSIDE IMAGES-DX CHEST   Final Result      CT-CTA NECK WITH & W/O-POST PROCESSING    (Results Pending)   US-TRAUMA VEIN SCREEN LOWER BILAT EXTREMITY    (Results Pending)   CT-WRIST W/O PLUS RECONS LEFT    (Results Pending)   CT-WRIST W/O PLUS RECONS RIGHT    (Results Pending)     The radiologist's interpretation of  all radiological studies have been reviewed by me.    COURSE & MEDICAL DECISION MAKING  Nursing notes, VS, PMSFHx, labs, imaging, EKG reviewed in chart.    MDM: 6:07 PM Leobardo Young is a 62 y.o. male who presented with multitrauma.  Patient is previously healthy, takes no medications.  Was out for a hike today, fell on bilateral outstretched hands, struck his face against the ground.  Was seen at [place] emergency department where he was found to have trauma pan scans revealing a C3 vertebral body fracture with displacement, bilateral wrist fractures, multiple facial fractures and sent here for evaluation.  Arrives as a trauma green and seen by myself.  GCS of 15, multitrauma as described in physical exam, please refer to this.  Discussed with trauma surgery who will admit the patient, discussed with orthopedic surgery who asked for reduction of the wrist and CT imaging of the wrist which was done, see procedure notes.  Discussed with facial fracture who will see the patient.  Discussed with spinal surgeon who will see the patient for evaluation but recommends at this time only a c-collar and no likely intervention after reviewing imaging from outside facility.  Patient underwent procedural sedation for bilateral wrist reductions.  He had a 3 and half centimeter lip laceration which was repaired at bedside by myself, see procedure note.  Patient had routine labs which were fairly unremarkable other than a leukocytosis likely reactive from multitrauma.  Patient and family updated and he is amenable to admission for monitoring and consultation with multi specialist.  Resting comfortably.  Vital signs are fairly unremarkable other than hypertension which slowly improved without treatment here in the ED.     6:42 PM I discussed the patient's case and the above findings with Dr. Ocampo (Ortho) who recommends conscious sedation, reduction, and splint to the right wrist.  He also wants a CT of the bilateral wrists.      6:45 PM- Discussed the patient's case with Dr. Marquez (Trauma) who will hospitalize the patient.     7:03 PM I discussed the patient's case and the above findings with Dr. Marc (facial fracture) who agrees to evaluate the patient's case.     7:07 PM I discussed the patient's case and the above findings with Dr. Edgar (Spine) who will see the patient tomorrow for consultation, but will likely only need a C-collar and to be seen out patient for a follow up.     DISPOSITION:  Patient will be hospitalized by Dr. Marquez in guarded condition.    FINAL IMPRESSION  1. Other closed displaced fracture of third cervical vertebra, initial encounter ([facility]) Acute   2. Wrist fracture, bilateral, closed, initial encounter Acute   3. Closed fracture of nasal bone, initial encounter Acute   4. Closed fracture of orbital wall, initial encounter ([facility]) Acute      Conscious sedation procedure performed by ERP  2 Joint reduction procedures performed by ERP     Roxane ROBLES (Scribe), am scribing for, and in the presence of, Jaspal Miranda.    Electronically signed by: Roxane Brar (Rafael), 10/13/2022    IJaspal personally performed the services described in this documentation, as scribed by Roxane Brar in my presence, and it is both accurate and complete.    The note accurately reflects work and decisions made by me.  Jaspal Miranda  10/13/2022  9:28 PM

## 2022-10-14 NOTE — PROGRESS NOTES
Trauma / Surgical Daily Progress Note    Date of Service  10/14/2022    Chief Complaint  62 y.o. male admitted 10/13/2022 with spinal fracture, facial fxs, and bilateral wrists fxs status post fall while hiking.    Interval Events  No critical events overnight.  Adequate pain control.  Orthopedic surgery note reviewed.    - Tentative repair of bilateral wrist fractures next week.   - Facial fracture and spine fracture recommendations pending.   - Diet per facial fracture recommendations   - Clinically stable at this time, awaiting reyes bed.  Discussed with case coordination.     Review of Systems  Review of Systems   Constitutional:  Negative for chills and fever.   HENT:  Positive for congestion and sinus pain.         Facial fracture pain.   No difficulty swallowing.    Eyes:         Baseline vision.   Respiratory:  Negative for shortness of breath.    Cardiovascular:  Negative for chest pain.   Gastrointestinal:  Negative for abdominal pain, nausea and vomiting.   Genitourinary:  Negative for dysuria.   Musculoskeletal:  Positive for joint pain, myalgias and neck pain. Negative for back pain.   Skin: Negative.    Neurological: Negative.       Vital Signs  Temp:  [36 °C (96.8 °F)-36.9 °C (98.4 °F)] 36.5 °C (97.7 °F)  Pulse:  [58-91] 83  Resp:  [11-24] 16  BP: (116-233)/() 173/78  SpO2:  [82 %-99 %] 97 %    Physical Exam  Physical Exam  Vitals and nursing note reviewed.   Constitutional:       General: He is awake. He is not in acute distress.     Appearance: He is not ill-appearing, toxic-appearing or diaphoretic.   HENT:      Head:      Comments: Significant facial trauma.  Lip laceration approximated.      Nose: Congestion present.      Mouth/Throat:      Pharynx: Oropharynx is clear.   Eyes:      Pupils: Pupils are equal, round, and reactive to light.      Comments: Bilateral periorbital ecchymosis.    Cardiovascular:      Rate and Rhythm: Normal rate and regular rhythm.      Comments: Fingers warm and  well perfused bilaterally.   Pulmonary:      Effort: No respiratory distress.   Chest:      Chest wall: No tenderness.   Abdominal:      General: There is no distension.      Tenderness: There is no abdominal tenderness. There is no guarding or rebound.   Musculoskeletal:      Right wrist: Swelling and bony tenderness (Splinted) present.      Left wrist: Swelling and bony tenderness (splinted) present.      Cervical back: Normal range of motion.   Skin:     General: Skin is warm and dry.      Capillary Refill: Capillary refill takes less than 2 seconds.      Comments: abrasions   Neurological:      General: No focal deficit present.      Mental Status: He is alert.      GCS: GCS eye subscore is 4. GCS verbal subscore is 5. GCS motor subscore is 6.   Psychiatric:         Mood and Affect: Mood normal.         Behavior: Behavior normal. Behavior is cooperative.         Thought Content: Thought content normal.         Judgment: Judgment normal.       Laboratory  Recent Results (from the past 24 hour(s))   CBC WITH DIFFERENTIAL    Collection Time: 10/13/22  3:15 PM   Result Value Ref Range    WBC 24.6 (H) 4.8 - 10.8 K/uL    RBC 5.38 4.70 - 6.10 M/uL    Hemoglobin 16.0 14.0 - 18.0 g/dL    Hematocrit 46.2 42.0 - 52.0 %    MCV 85.9 80.0 - 94.0 fL    MCH 29.7 28.7 - 33.1 pg    MCHC 34.6 33.0 - 37.0 g/dL    RDW 12.5 11.5 - 14.5 %    Platelet Count 340 130 - 400 K/uL    MPV 9.4 7.4 - 10.4 fL    Neutrophils Automated 84.9 (H) 39.0 - 70.0 %    Lymphocytes Automated 8.7 (L) 21.0 - 50.0 %    Monocytes Automated 5.9 1.7 - 10.0 %    Eosinophils Automated 0.1 0.0 - 5.0 %    Basophils Automated 0.4 0.0 - 3.0 %    Abs Neutrophils Automated 20.9 (H) 1.8 - 7.7 K/uL    Abs Lymph Automated 2.2 1.2 - 4.8 K/uL    Monos (Absolute) 1.5 (H) 0.2 - 0.9 K/uL   COMP METABOLIC PANEL    Collection Time: 10/13/22  3:15 PM   Result Value Ref Range    Sodium 140 137 - 145 mmol/L    Potassium 3.7 3.5 - 5.1 mmol/L    Chloride 106 98 - 107 mmol/L    Co2 20  (L) 22 - 30 mmol/L    Anion Gap 14 (H) 4 - 12 mmol/L    Glucose 146 (H) 70 - 100 mg/dL    Bun 24 (H) 9 - 20 mg/dL    Creatinine 0.8 0.7 - 1.3 mg/dL    Calcium 9.4 8.7 - 10.5 mg/dL    AST(SGOT) 40 15 - 46 U/L    ALT(SGPT) 29 13 - 69 U/L    Alkaline Phosphatase 72 38 - 126 U/L    Total Bilirubin 0.4 0.2 - 1.3 mg/dL    Albumin 4.7 3.5 - 5.0 g/dL    Total Protein 7.7 6.3 - 8.2 g/dL    A-G Ratio 1.6    ESTIMATED GFR    Collection Time: 10/13/22  3:15 PM   Result Value Ref Range    GFR (CKD-EPI) 99 >60 mL/min/1.73 m 2   CBC with Differential: Tomorrow AM    Collection Time: 10/14/22 10:07 AM   Result Value Ref Range    WBC 15.5 (H) 4.8 - 10.8 K/uL    RBC 4.56 (L) 4.70 - 6.10 M/uL    Hemoglobin 13.8 (L) 14.0 - 18.0 g/dL    Hematocrit 40.0 (L) 42.0 - 52.0 %    MCV 87.7 81.4 - 97.8 fL    MCH 30.3 27.0 - 33.0 pg    MCHC 34.5 33.7 - 35.3 g/dL    RDW 41.0 35.9 - 50.0 fL    Platelet Count 291 164 - 446 K/uL    MPV 9.4 9.0 - 12.9 fL    Neutrophils-Polys 79.60 (H) 44.00 - 72.00 %    Lymphocytes 10.00 (L) 22.00 - 41.00 %    Monocytes 9.60 0.00 - 13.40 %    Eosinophils 0.10 0.00 - 6.90 %    Basophils 0.20 0.00 - 1.80 %    Immature Granulocytes 0.50 0.00 - 0.90 %    Nucleated RBC 0.00 /100 WBC    Neutrophils (Absolute) 12.38 (H) 1.82 - 7.42 K/uL    Lymphs (Absolute) 1.56 1.00 - 4.80 K/uL    Monos (Absolute) 1.49 (H) 0.00 - 0.85 K/uL    Eos (Absolute) 0.01 0.00 - 0.51 K/uL    Baso (Absolute) 0.03 0.00 - 0.12 K/uL    Immature Granulocytes (abs) 0.07 0.00 - 0.11 K/uL    NRBC (Absolute) 0.00 K/uL       Fluids    Intake/Output Summary (Last 24 hours) at 10/14/2022 1022  Last data filed at 10/13/2022 1802  Gross per 24 hour   Intake 600 ml   Output 0 ml   Net 600 ml       Core Measures & Quality Metrics  Labs reviewed, Medications reviewed and Radiology images reviewed  Barajas catheter: No Barajas      DVT Prophylaxis: Contraindicated - High bleeding risk  DVT prophylaxis - mechanical: SCDs  Ulcer prophylaxis: No    Assessed for rehab:  Patient returned to prior level of function, rehabilitation not indicated at this time  RAP Score Total: 5  CAGE Results: not completed Blood Alcohol>0.08: no     Mental status adequate for full examination?: Yes    Spine cleared (radiologically and/or clinically): No    All current laboratory studies/radiology exams reviewed: Yes    Medications reconciliation has been reviewed: Yes    Completed Consultations:  Orthopedic surgery.  Neurosurgery- recommendations pending  Facial fracture - recommendations pending    Pending Consultations:  None    Newly Identified Diagnoses and Injuries:  None    Assessment/Plan  Contraindication to deep vein thrombosis (DVT) prophylaxis- (present on admission)  Assessment & Plan  Prophylactic anticoagulation for thrombotic prevention initially contraindicated secondary to elevated bleeding risk.  10/14 Trauma surveillance venous duplex scanning ordered.      Traumatic closed displaced fracture of distal end of radius and ulna, left, initial encounter- (present on admission)  Assessment & Plan  Comminuted intra-articular distal radius fracture on left.    Significant displacement of large radial sided fracture fragment.  Comminuted fracture of distal ulna.  Splinted at referring facility.  Reduced in ED with conscious sedation  CT bilateral wrists ordered  10/14 Tentative operative repair early next week..  Weight bearing status - Nonweightbearing RHIANNON Ocampo MD. Orthopedic Surgeon. [facility].     Closed fracture distal radius and ulna, right, initial encounter- (present on admission)  Assessment & Plan  Comminuted dorsally angulated intra-articular distal radius fracture with impaction.    Mildly distracted fracture of the base of the ulnar styloid.  Splinted at referring facility  Reduced in ED with conscious sedation  CT bilateral wrists ordered  10/14 Tentative operative repair early next week..  Weight bearing status - Nonweightbearing DEEP Ocampo MD.  Orthopedic Surgeon. [facility].     Closed fracture of third cervical vertebra, initial encounter (HCC)- (present on admission)  Assessment & Plan  Fracture of the anterior inferior corner of the C3 vertebral body with slight anterior displacement approximately 3 mm.  Equivocal nondisplaced fracture line extending to superior endplate of the vertebral body.  Left C3-4 facet mildly widened with posterior displacement of C3 facet in relation to C4  CTA neck within normal limits  Non-operative management.   Cervical immobilization.   Recommend rigid cervical orthosis. Follow up in clinic 6 weeks with AP / lateral cervical spine xrays  Garrett Edgar MD, PhD. Neurosurgeon. [facility].     Multiple facial fractures, closed, initial encounter (HCC)- (present on admission)  Assessment & Plan  Fractures of the anterior, medial and superior maxillary sinuses bilaterally, fracture of the right lateral maxillary sinus, bilateral nasal fractures.  Comminuted frontal sinus fracture which appears to involve superior orbital walls bilaterally.    Comminuted displaced fracture of the right superior medial orbit, no retro-orbital hematoma.  Bilateral pterygoid plate fractures.  LeForte III type fracture  Keep HOB elevated  Definitive plan pending.  Huey Ahuja MD. Plastic Surgeon. [facility].    Lip laceration- (present on admission)  Assessment & Plan  Repaired in ED     Finger dislocation, initial encounter- (present on admission)  Assessment & Plan  Dorsal dislocation of the PIP joint of the long finger without associated fracture.  Reduced at referring facility.     Trauma- (present on admission)  Assessment & Plan  Fall while hiking  Trauma Green Transfer Activation.  Marck Marquez MD. Trauma Surgery.       Discussed patient condition with Patient and trauma surgery., Dr. Marck Marquez.

## 2022-10-14 NOTE — ED TRIAGE NOTES
Chief Complaint   Patient presents with    Trauma Green     Transfer from [place] on [facility] after patient fell onto rocks while hiking today. Facial fx, bilateral wrist fx, C3 fx. 2L on oxy mask

## 2022-10-14 NOTE — ASSESSMENT & PLAN NOTE
Dorsal dislocation of the PIP joint of the long finger without associated fracture.  Reduced at referring facility.

## 2022-10-14 NOTE — ED NOTES
Transfer from [place] on [facility] after patient fell onto rocks while hiking today. Facial fx, bilateral wrist fx, C3 fx. 2L on oxy mask

## 2022-10-14 NOTE — H&P
Trauma Surgery History and Physical  10/13/2022    Trauma Physician: Marck Marquez MD.     CC: Trauma The patient was triaged as a Trauma Green Transfer in accordance with established pre hospital protols. An expeditious primary and secondary survey with required adjuncts was conducted. See Trauma Narrator for full details.    HPI: This is a 62 y.o male presents to [facility] for injury sustained while fall  while hiking.   Patient was hiking today at a [place] on the [facility] [place].  He was coming back down at ~ 1300 hrs when he tripped on a rock and then stumbled falling forward.  He had both arms outstretched but despite this a rock went between them and he struck his face on the rock.  He had no loss of consciousness and has full recall of the events.  Patient was able to eventually sit himself up and was able to hike for another 90 minutes back to the parking lot.  Bystanders contacted ambulance and he was transported to [facility].    Evaluation at sending facility shows bilateral wrist fractures that were splinted but not reduced, a C3 teardrop fracture (patient is in a collar) and bilateral facial fractures - possible LeFort III type fracture.    Tetanus immunization administered at sending facility today.  No medications on a regular basis  No significant past medical history  Patient is on no blood thinners or antiplatelet agents    History reviewed. No pertinent past medical history.    History reviewed. No pertinent surgical history.    Current Facility-Administered Medications   Medication Dose Route Frequency Provider Last Rate Last Admin    propofol (DIPRIVAN) 20mL vial injection (RWN)  200 mg Intravenous Once Jaspal Miranda        Respiratory Therapy Consult   Nebulization Continuous RT Lilli Ron P.A.-C.        Pharmacy Consult Request ...Pain Management Review 1 Each  1 Each Other PHARMACY TO DOSE Lilli Ron P.A.-C.         ondansetron (ZOFRAN) syringe/vial injection 4 mg  4 mg Intravenous Q4HRS PRN Lilli Ron P.A.-C.        ondansetron (ZOFRAN ODT) dispertab 4 mg  4 mg Oral Q4HRS PRN Lilli Ron, P.A.-C.        docusate sodium (COLACE) capsule 100 mg  100 mg Oral BID Lilli Ron P.A.-C.        senna-docusate (PERICOLACE or SENOKOT S) 8.6-50 MG per tablet 1 Tablet  1 Tablet Oral Nightly Lilli Ron, P.A.-C.        senna-docusate (PERICOLACE or SENOKOT S) 8.6-50 MG per tablet 1 Tablet  1 Tablet Oral Q24HRS PRN Lilli Ron P.A.-C.        polyethylene glycol/lytes (MIRALAX) PACKET 1 Packet  1 Packet Oral BID Lilli Ron P.A.-C.        [START ON 10/14/2022] magnesium hydroxide (MILK OF MAGNESIA) suspension 30 mL  30 mL Oral DAILY Lilli Ron P.A.-C.        bisacodyl (DULCOLAX) suppository 10 mg  10 mg Rectal Q24HRS PRN Lilli Ron P.A.-C.        sodium phosphate (Fleet) enema 133 mL  1 Each Rectal Once PRN Lilli Ron P.A.-C.        LR infusion   Intravenous Continuous ADRIANA Yi.A.-C.        acetaminophen (Tylenol) tablet 650 mg  650 mg Oral Q6HRS Lilli Ron P.A.-C.        Followed by    [START ON 10/18/2022] acetaminophen (Tylenol) tablet 650 mg  650 mg Oral Q6HRS PRN Lilli Ron, P.A.-C.        oxyCODONE immediate-release (ROXICODONE) tablet 5 mg  5 mg Oral Q3HRS PRN Lilli Ron, P.A.-C.        Or    oxyCODONE immediate release (ROXICODONE) tablet 10 mg  10 mg Oral Q3HRS PRN Lilli Ron P.A.-C.        Or    HYDROmorphone (Dilaudid) injection 0.5 mg  0.5 mg Intravenous Q3HRS PRN ADRIANA Yi.A.-C.        gabapentin (NEURONTIN) capsule 100 mg  100 mg Oral Q8HRS ADRIANA Yi.A.-C.        metaxalone (Skelaxin) tablet 400 mg  400 mg Oral TID ADRIANA Yi.A.-CLiliya        hydrALAZINE (APRESOLINE) injection 10 mg  10 mg Intravenous Q4HRS PRN ADRIANA Yi.A.-C.        bacitracin-polymyxin b (POLYSPORIN) [id number] UNIT/GM ointment   Topical TID ADRIANA Yi.A.-C.         acetaminophen (Tylenol) tablet 650 mg  650 mg Oral Q4HRS PRN Lilli Ron P.A.-C.        Or    acetaminophen (TYLENOL) suppository 650 mg  650 mg Rectal Q4HRS PRN Lilli Ron P.A.-C.         Current Outpatient Medications   Medication Sig Dispense Refill    Omega-3 Fatty Acids (FISH OIL PO) Take 2 Capsules by mouth every day.      Cholecalciferol (VITAMIN D-3 PO) Take 1 Tablet by mouth every day.         Social History     Socioeconomic History    Marital status: Single     Spouse name: Not on file    Number of children: Not on file    Years of education: Not on file    Highest education level: Not on file   Occupational History    Not on file   Tobacco Use    Smoking status: Former     Types: Cigarettes    Smokeless tobacco: Never   Vaping Use    Vaping Use: Never used   Substance and Sexual Activity    Alcohol use: Yes     Comment: occasional    Drug use: Yes     Comment: marijuana edibles    Sexual activity: Not on file   Other Topics Concern    Not on file   Social History Narrative    Not on file     Social Determinants of Health     Financial Resource Strain: Not on file   Food Insecurity: Not on file   Transportation Needs: Not on file   Physical Activity: Not on file   Stress: Not on file   Social Connections: Not on file   Intimate Partner Violence: Not on file   Housing Stability: Not on file       History reviewed. No pertinent family history.    Allergies:  Patient has no known allergies.    Review of Systems:  Constitutional: Negative for fever, chills, weight loss, malaise/fatigue and diaphoresis.   HENT: Negative for hearing loss, ear pain, nosebleeds, congestion, sore throat, neck pain, and ear discharge.  Positive for facial pain.  Eyes: Negative for blurred vision, double vision, and redness.   Respiratory: Negative for cough, sputum production, shortness of breath, wheezing and stridor.    Cardiovascular: Negative for chest pain, palpitations.   Gastrointestinal: Negative for heartburn,  "nausea, vomiting, abdominal pain, diarrhea, constipation.  Genitourinary: Negative for dysuria, urgency, frequency.  Musculoskeletal: Negative for myalgias, back pain, joint pain and falls. Positive for neck pain and bilateral wrist pain.  Skin: Negative for itching and rash.  Neurological: Negative for dizziness, loss of consciousness, weakness and headaches.   Endo/Heme/Allergies: Negative for environmental allergies. Does not bruise/bleed easily.   Psychiatric/Behavioral: Negative for depression and substance abuse. The patient is not nervous/anxious.    Physical Exam:  BP (!) 148/79   Pulse 68   Temp 36.5 °C (97.7 °F)   Resp 12   Ht 1.803 m (5' 11\")   Wt 83.9 kg (185 lb)   SpO2 98%     Constitutional: Awake, alert, oriented x3. No acute distress. GCS 15. E4 V5 M6.  Head: No cephalohematoma. Pupils are 2 mm,  reactive bilaterally. Midface stable. No malocclusion.  TMs clear bilaterally. No drainage from the mouth or nose.  Bilateral periorbital ecchymosis left greater than right.  Nasal swelling.  Dried blood from nares.  Neck: No tracheal deviation. No midline cervical spine tenderness. C-collar in place.   Cardiovascular: Normal rate, regular rhythm, normal heart sounds and intact distal pulses.  Exam reveals no gallop and no friction rub.  No murmur heard.  Pulmonary/Chest: Clavicles nontender to palpation. There is no chest wall tenderness bilaterally.  No crepitus. Positive breath sounds bilaterally.   Abdominal: Soft, nondistended. Nontender to palpation. There is no anterior diastasis of the pelvic symphysis. The pelvis is stable to anterior-posterior compression.   Musculoskeletal: Right upper extremity - deformity at the wrist -splinted, normal cap refill.   Left upper extremity deformity at the wrist - splinted, normal cap refill.  Right lower extremity grossly atraumatic. 5/5 strength in ankle plantar flexion and dorsiflexion. No pain and full ROM at right knee and hip.   Left  lower extremity " grossly atraumatic. 5/5 strength in ankle plantar flexion and dorsiflexion. No pain and full ROM at left knee and hip.   Back: Midline thoracic and lumbar spines are nontender to palpation. No step-offs.   : Normal male external genitalia. Rectal exam not done. No blood visible at urethral meatus.   Neurological: Sensation intact to light touch dorsum and plantar surfaces of both feet and the medial and lateral aspects of both lower legs.  Sensation intact to light touch dorsum and plantar surfaces of both hands.   Skin: Skin is warm and dry.  No diaphoresis. No erythema. No pallor.     Labs:  Recent Labs     10/13/22  1515   WBC 24.6*   RBC 5.38   HEMOGLOBIN 16.0   HEMATOCRIT 46.2   MCV 85.9   MCH 29.7   MCHC 34.6   RDW 12.5   PLATELETCT 340   MPV 9.4     Recent Labs     10/13/22  1515   SODIUM 140   POTASSIUM 3.7   CHLORIDE 106   CO2 20*   GLUCOSE 146*   BUN 24*   CREATININE 0.8   CALCIUM 9.4         Recent Labs     10/13/22  1515   ASTSGOT 40   ALTSGPT 29   TBILIRUBIN 0.4   ALKPHOSPHAT 72       Radiology:  OUTSIDE IMAGES-DX PELVIS   Final Result      OUTSIDE IMAGES-DX UPPER EXTREMITY, RIGHT   Final Result      OUTSIDE IMAGES-DX UPPER EXTREMITY, RIGHT   Final Result      OUTSIDE IMAGES-DX UPPER EXTREMITY, LEFT   Final Result      OUTSIDE IMAGES-CT FACE   Final Result      OUTSIDE IMAGES-CT CERVICAL SPINE   Final Result      OUTSIDE IMAGES-CT HEAD   Final Result      OUTSIDE IMAGES-DX CHEST   Final Result      CT-CTA NECK WITH & W/O-POST PROCESSING    (Results Pending)   US-TRAUMA VEIN SCREEN LOWER BILAT EXTREMITY    (Results Pending)   CT-WRIST W/O PLUS RECONS LEFT    (Results Pending)   CT-WRIST W/O PLUS RECONS RIGHT    (Results Pending)         Assessment: This is a 62 y.o male with extensive facial fractures, C3 fracture, bilateral wrist fractures.    Plan:     Neurosurgery consult -Dr. Edgar  Facial trauma consult -Dr. Huey Ahuja  Orthopedic consult -Dr. Ocampo  CTA of neck to evaluate for blunt vascular  injury  CT bilateral wrists per orthopedics request after reduction of wrist fractures by ERP with conscious sedation  Admit to orthopedics floor  Keep head of bed elevated greater than 30 degrees  No plans for surgery at this time - can do small amount of liquids    Trauma  Fall while hiking  Trauma Green Transfer Activation.  Marck Marquez MD. Trauma Surgery.    Multiple facial fractures, closed, initial encounter (HCC)  Fractures of the anterior, medial and superior maxillary sinuses bilaterally, fracture of the right lateral maxillary sinus, bilateral nasal fractures.  Comminuted frontal sinus fracture which appears to involve superior orbital walls bilaterally.    Comminuted displaced fracture of the right superior medial orbit, no retro-orbital hematoma.  Bilateral pterygoid plate fractures.  LeForte III type fracture  Keep HOB elevated  Definitive plan pending.  Huey Ahuja MD. Plastic Surgeon. [facility].    Closed fracture of third cervical vertebra, initial encounter (HCC)  Fracture of the anterior inferior corner of the C3 vertebral body with slight anterior displacement approximately 3 mm.  Equivocal nondisplaced fracture line extending to superior endplate of the vertebral body.  Left C3-4 facet mildly widened with posterior displacement of C3 facet in relation to C4  CTA neck: pending  Non-operative management.   Cervical immobilization.   Recommend rigid cervical orthosis. Follow up in clinic 6 weeks with AP / lateral cervical spine xrays  Garrett Edgar MD, PhD. Neurosurgeon. [facility].     Finger dislocation, initial encounter  Dorsal dislocation of the PIP joint of the long finger without associated fracture.  Reduced at referring facility.    Closed fracture distal radius and ulna, right, initial encounter  Comminuted dorsally angulated intra-articular distal radius fracture with impaction.    Mildly distracted fracture of the base of the ulnar  styloid.  Splinted at referring facility  Reduced in ED with conscious sedation  CT bilateral wrists ordered  Definitive plan pending.  Weight bearing status - Nonweightbearing DEEP Ocampo MD. Orthopedic Surgeon. [facility].     Traumatic closed displaced fracture of distal end of radius and ulna, left, initial encounter  Comminuted intra-articular distal radius fracture on left.    Significant displacement of large radial sided fracture fragment.  Comminuted fracture of distal ulna.  Splinted at referring facility.  Reduced in ED with conscious sedation  CT bilateral wrists ordered  Definitive plan pending.  Weight bearing status - Nonweightbearing RHIANNON Ocampo MD. Orthopedic Surgeon. [facility].     Contraindication to deep vein thrombosis (DVT) prophylaxis  Prophylactic anticoagulation for thrombotic prevention initially contraindicated secondary to elevated bleeding risk.  10/14 Trauma surveillance venous duplex scanning ordered.     Lip laceration  Repaired in ED      Time spent: Trauma / Critical Care Time 75 minutes excluding procedures.      _________________________  Marck Marquez MD

## 2022-10-14 NOTE — ASSESSMENT & PLAN NOTE
Fractures of the anterior, medial and superior maxillary sinuses bilaterally, fracture of the right lateral maxillary sinus, bilateral nasal fractures.  Comminuted frontal sinus fracture which appears to involve superior orbital walls bilaterally.    Comminuted displaced fracture of the right superior medial orbit, no retro-orbital hematoma.  Bilateral pterygoid plate fractures.  LeForte III type fracture.  10/18 ORIF bilateral Le Fort fractures through multiple surgical approaches including interdental fixation.  Huey Ahuja MD. Plastic Surgeon. [facility].

## 2022-10-14 NOTE — PROGRESS NOTES
Reviewed xray's  Will need ORIF of B/L wrists  If remains IPD will try and schedule early next week

## 2022-10-14 NOTE — CONSULTS
DATE OF SERVICE:  10/14/2022     REQUESTING PHYSICIAN:  Jaspal Miranda MD     TIME CONSULTED:  10/13/2022 at approximately 1940 hours.       TIME SEEN:  10/14/2022 at approximately 0718 hours.     CHIEF COMPLAINT:  Bilateral wrist pain.     HISTORY OF PRESENT ILLNESS:  The patient is a 62-year-old man who lives in [place] and is retired.  He was hiking up in [place] and fell landing on his   face and both wrists.  He had loss of consciousness.  He was found to have   bilateral distal radius fractures, multiple facial fractures and a cervical   spine fracture and was transferred to [facility] for evaluation.    Orthopedic consultation was requested.  His wrists were reduced and splinted   in the emergency room.     ALLERGIES:  None.     MEDICATIONS:  None.     PAST MEDICAL HISTORY:  None.     PAST SURGICAL HISTORY:  Cyst removal.     SOCIAL HISTORY:  The patient does not currently smoke.  He does drink alcohol   occasionally and uses marijuana edibles.  Lives in [place] and is   retired and is right hand dominant.     FAMILY HISTORY:  Negative.     REVIEW OF SYSTEMS:  There was loss of consciousness, no nausea, vomiting,   diarrhea, constipation, polyuria, dysuria, fevers, chills, weight loss, weight   gain, abdominal pain, chest pain or shortness of breath.     PHYSICAL EXAMINATION:    GENERAL:  The patient is in no acute distress.  VITAL SIGNS:  His blood pressure is 173/78, heart rate 83, respirations 16,   temperature 97.7.  HEENT:  There is diffuse ecchymosis and swelling of his face.  NECK:  Is in a cervical collar.   CHEST:  Nontender.    PELVIS:  Stable.  EXTREMITIES:  Lower extremities without tenderness or deformity.  The upper   extremities are in splints.  He is able to fire EPL, FPL and interossei   bilaterally.  Skin is reportedly intact.     LABORATORY DATA:  Include white blood cell count of 24,600, hematocrit 46.8%,   platelet count 46.2%, platelet count 340,000.  Sodium  140, potassium 3.7,   creatinine 0.8.  AST and ALT are normal.  Albumin is 4.7.     DIAGNOSTIC STUDIES:  Radiographs of the left wrist show severely impacted and   angulated distal radius fracture, which is intraarticular.     Radiographs of the right wrist show displaced intra-articular distal radius   fracture with volar and radial translation.     ASSESSMENT:   1.  Bilateral distal radius fractures, closed, displaced, intraarticular.  2.  Multiple facial fractures.  3.  C3 fracture.     PLAN:  For wrist, the patient will need eventual surgical treatment likely   next week.  I will discuss with one of our hand team.  If he is ready to be   discharged before then, he could have this addressed in the next 1-2 weeks in   [place] where he lives.        ______________________________  KO CARRILLO MD    TJO/NIT    DD:  10/14/2022 08:56  DT:  10/14/2022 09:57    Job#:  [id number]

## 2022-10-15 PROBLEM — Z78.9 NO CONTRAINDICATION TO DEEP VEIN THROMBOSIS (DVT) PROPHYLAXIS: Status: ACTIVE | Noted: 2022-10-13

## 2022-10-15 PROBLEM — R33.9 URINARY RETENTION: Status: ACTIVE | Noted: 2022-10-15

## 2022-10-15 LAB
ANION GAP SERPL CALC-SCNC: 9 MMOL/L (ref 7–16)
BASOPHILS # BLD AUTO: 0.1 % (ref 0–1.8)
BASOPHILS # BLD: 0.02 K/UL (ref 0–0.12)
BUN SERPL-MCNC: 11 MG/DL (ref 8–22)
CALCIUM SERPL-MCNC: 8.6 MG/DL (ref 8.5–10.5)
CHLORIDE SERPL-SCNC: 101 MMOL/L (ref 96–112)
CO2 SERPL-SCNC: 25 MMOL/L (ref 20–33)
CREAT SERPL-MCNC: 0.56 MG/DL (ref 0.5–1.4)
EOSINOPHIL # BLD AUTO: 0.01 K/UL (ref 0–0.51)
EOSINOPHIL NFR BLD: 0.1 % (ref 0–6.9)
ERYTHROCYTE [DISTWIDTH] IN BLOOD BY AUTOMATED COUNT: 40.4 FL (ref 35.9–50)
GFR SERPLBLD CREATININE-BSD FMLA CKD-EPI: 110 ML/MIN/1.73 M 2
GLUCOSE SERPL-MCNC: 128 MG/DL (ref 65–99)
HCT VFR BLD AUTO: 38.4 % (ref 42–52)
HGB BLD-MCNC: 13.2 G/DL (ref 14–18)
IMM GRANULOCYTES # BLD AUTO: 0.08 K/UL (ref 0–0.11)
IMM GRANULOCYTES NFR BLD AUTO: 0.5 % (ref 0–0.9)
LYMPHOCYTES # BLD AUTO: 1.57 K/UL (ref 1–4.8)
LYMPHOCYTES NFR BLD: 9.5 % (ref 22–41)
MCH RBC QN AUTO: 30.2 PG (ref 27–33)
MCHC RBC AUTO-ENTMCNC: 34.4 G/DL (ref 33.7–35.3)
MCV RBC AUTO: 87.9 FL (ref 81.4–97.8)
MONOCYTES # BLD AUTO: 1.15 K/UL (ref 0–0.85)
MONOCYTES NFR BLD AUTO: 6.9 % (ref 0–13.4)
NEUTROPHILS # BLD AUTO: 13.75 K/UL (ref 1.82–7.42)
NEUTROPHILS NFR BLD: 82.9 % (ref 44–72)
NRBC # BLD AUTO: 0 K/UL
NRBC BLD-RTO: 0 /100 WBC
PLATELET # BLD AUTO: 260 K/UL (ref 164–446)
PMV BLD AUTO: 9.5 FL (ref 9–12.9)
POTASSIUM SERPL-SCNC: 3.8 MMOL/L (ref 3.6–5.5)
RBC # BLD AUTO: 4.37 M/UL (ref 4.7–6.1)
SODIUM SERPL-SCNC: 135 MMOL/L (ref 135–145)
WBC # BLD AUTO: 16.6 K/UL (ref 4.8–10.8)

## 2022-10-15 PROCEDURE — 80048 BASIC METABOLIC PNL TOTAL CA: CPT

## 2022-10-15 PROCEDURE — 770001 HCHG ROOM/CARE - MED/SURG/GYN PRIV*

## 2022-10-15 PROCEDURE — 85025 COMPLETE CBC W/AUTO DIFF WBC: CPT

## 2022-10-15 PROCEDURE — 36415 COLL VENOUS BLD VENIPUNCTURE: CPT

## 2022-10-15 PROCEDURE — 700111 HCHG RX REV CODE 636 W/ 250 OVERRIDE (IP): Performed by: SURGERY

## 2022-10-15 PROCEDURE — A9270 NON-COVERED ITEM OR SERVICE: HCPCS | Performed by: SPECIALIST

## 2022-10-15 PROCEDURE — 99232 SBSQ HOSP IP/OBS MODERATE 35: CPT | Performed by: NURSE PRACTITIONER

## 2022-10-15 PROCEDURE — 700102 HCHG RX REV CODE 250 W/ 637 OVERRIDE(OP): Performed by: NURSE PRACTITIONER

## 2022-10-15 PROCEDURE — A9270 NON-COVERED ITEM OR SERVICE: HCPCS | Performed by: NURSE PRACTITIONER

## 2022-10-15 PROCEDURE — 700102 HCHG RX REV CODE 250 W/ 637 OVERRIDE(OP): Performed by: SPECIALIST

## 2022-10-15 PROCEDURE — 700101 HCHG RX REV CODE 250: Performed by: SPECIALIST

## 2022-10-15 PROCEDURE — 99231 SBSQ HOSP IP/OBS SF/LOW 25: CPT | Mod: 57 | Performed by: SURGERY

## 2022-10-15 RX ORDER — TAMSULOSIN HYDROCHLORIDE 0.4 MG/1
0.4 CAPSULE ORAL
Status: DISCONTINUED | OUTPATIENT
Start: 2022-10-15 | End: 2022-10-22

## 2022-10-15 RX ADMIN — Medication 1 EACH: at 05:04

## 2022-10-15 RX ADMIN — METAXALONE 400 MG: 800 TABLET ORAL at 13:09

## 2022-10-15 RX ADMIN — Medication 1 EACH: at 18:28

## 2022-10-15 RX ADMIN — DOCUSATE SODIUM 100 MG: 100 CAPSULE, LIQUID FILLED ORAL at 05:04

## 2022-10-15 RX ADMIN — GABAPENTIN 100 MG: 100 CAPSULE ORAL at 13:09

## 2022-10-15 RX ADMIN — METAXALONE 400 MG: 800 TABLET ORAL at 18:03

## 2022-10-15 RX ADMIN — ACETAMINOPHEN 650 MG: 325 TABLET, FILM COATED ORAL at 05:04

## 2022-10-15 RX ADMIN — ACETAMINOPHEN 650 MG: 325 TABLET, FILM COATED ORAL at 18:03

## 2022-10-15 RX ADMIN — SENNOSIDES AND DOCUSATE SODIUM 1 TABLET: 50; 8.6 TABLET ORAL at 21:31

## 2022-10-15 RX ADMIN — ENOXAPARIN SODIUM 30 MG: 30 INJECTION SUBCUTANEOUS at 18:03

## 2022-10-15 RX ADMIN — GABAPENTIN 100 MG: 100 CAPSULE ORAL at 21:31

## 2022-10-15 RX ADMIN — OXYCODONE 5 MG: 5 TABLET ORAL at 18:08

## 2022-10-15 RX ADMIN — DOCUSATE SODIUM 100 MG: 100 CAPSULE, LIQUID FILLED ORAL at 18:03

## 2022-10-15 RX ADMIN — TAMSULOSIN HYDROCHLORIDE 0.4 MG: 0.4 CAPSULE ORAL at 10:36

## 2022-10-15 RX ADMIN — ACETAMINOPHEN 650 MG: 325 TABLET, FILM COATED ORAL at 23:25

## 2022-10-15 RX ADMIN — ENOXAPARIN SODIUM 30 MG: 30 INJECTION SUBCUTANEOUS at 05:04

## 2022-10-15 RX ADMIN — GABAPENTIN 100 MG: 100 CAPSULE ORAL at 05:04

## 2022-10-15 RX ADMIN — Medication: at 12:00

## 2022-10-15 RX ADMIN — ACETAMINOPHEN 650 MG: 325 TABLET, FILM COATED ORAL at 10:36

## 2022-10-15 RX ADMIN — OXYCODONE 5 MG: 5 TABLET ORAL at 21:31

## 2022-10-15 RX ADMIN — METAXALONE 400 MG: 800 TABLET ORAL at 05:04

## 2022-10-15 ASSESSMENT — ENCOUNTER SYMPTOMS
BLURRED VISION: 0
TINGLING: 0
CHILLS: 0
HEADACHES: 0
NAUSEA: 0
BACK PAIN: 0
SHORTNESS OF BREATH: 0
SENSORY CHANGE: 0
VOMITING: 0
FEVER: 0
MYALGIAS: 1
COUGH: 0
DOUBLE VISION: 0
SPEECH CHANGE: 0
ABDOMINAL PAIN: 0
NECK PAIN: 1
DIZZINESS: 0
PALPITATIONS: 0
TREMORS: 0

## 2022-10-15 ASSESSMENT — PAIN DESCRIPTION - PAIN TYPE
TYPE: ACUTE PAIN

## 2022-10-15 NOTE — PROGRESS NOTES
Assumed care of patient at 0645. Bedside report received. Assessment complete.  AA&Ox4. Denies CP/SOB.  Reporting 2/10 pain. Medicated per MAR.  Educated patient regarding pharmacologic and non pharmacologic modalities for pain management.  Skin per flowsheets  Tolerating full liquid diet. Denies N/V.  + void. Last BM PTA   Pt ambulates BUE NonWB.  All needs met at this time. Call light within reach. Pt calls appropriately. Bed low and locked, non skid socks in place. Hourly rounding in place.

## 2022-10-15 NOTE — THERAPY
Physical Therapy Contact Note    Patient Name: Leobardo Yonug  Age:  62 y.o., Sex:  male  Medical Record #: [id number]  Today's Date: 10/15/2022    PT eval order received. Pt pending surgery for facial fractures on 10/17. PT will hold until post-op. PT to check back as able.

## 2022-10-15 NOTE — PROGRESS NOTES
Trauma / Surgical Daily Progress Note    Date of Service  10/15/2022    Chief Complaint  62 y.o. male admitted 10/13/2022 with Trauma, hiking and fall, spinal fracture, facial fxs, bilateral wrists fxs    Interval Events  Transferred to reyes  Pain control adequate  Maintaining airway    - Operative fixation pending  - Mobilize  - Okay for full liquid diet     Review of Systems  Review of Systems   Constitutional:  Negative for chills and fever.   Eyes:  Negative for blurred vision and double vision.   Respiratory:  Negative for cough and shortness of breath.    Cardiovascular:  Negative for palpitations.   Gastrointestinal:  Negative for abdominal pain, nausea and vomiting.   Genitourinary:         Voiding   Musculoskeletal:  Positive for joint pain, myalgias and neck pain. Negative for back pain.   Neurological:  Negative for dizziness, tingling, tremors, sensory change, speech change and headaches.      Vital Signs  Temp:  [36.3 °C (97.3 °F)-37.1 °C (98.7 °F)] 36.3 °C (97.3 °F)  Pulse:  [58-87] 63  Resp:  [13-18] 16  BP: (125-169)/(58-90) 138/66  SpO2:  [93 %-99 %] 95 %    Physical Exam  Physical Exam  Vitals and nursing note reviewed.   Constitutional:       General: He is awake. He is not in acute distress.     Appearance: He is not toxic-appearing.      Interventions: Cervical collar and nasal cannula in place.   HENT:      Head:      Comments: Significant facial trauma.  Lip laceration approximated.     Nose: Congestion present.      Comments: Dried blood to nares     Mouth/Throat:      Pharynx: Oropharynx is clear.   Eyes:      Conjunctiva/sclera: Conjunctivae normal.      Comments: Bilateral periorbital ecchymosis.    Cardiovascular:      Rate and Rhythm: Normal rate and regular rhythm.   Pulmonary:      Effort: Pulmonary effort is normal. No respiratory distress.   Chest:      Chest wall: No tenderness.   Abdominal:      General: There is no distension.      Palpations: Abdomen is soft.      Tenderness:  There is no abdominal tenderness. There is no guarding.   Musculoskeletal:         General: Tenderness and signs of injury present.      Comments: Splint to bilateral upper extremities, wiggles fingers bilaterally   Skin:     General: Skin is warm and dry.      Capillary Refill: Capillary refill takes less than 2 seconds.      Comments: Scattered abrasions   Neurological:      Mental Status: He is alert and oriented to person, place, and time.   Psychiatric:         Behavior: Behavior normal. Behavior is cooperative.       Laboratory  Recent Results (from the past 24 hour(s))   CBC with Differential: Tomorrow AM    Collection Time: 10/14/22 10:07 AM   Result Value Ref Range    WBC 15.5 (H) 4.8 - 10.8 K/uL    RBC 4.56 (L) 4.70 - 6.10 M/uL    Hemoglobin 13.8 (L) 14.0 - 18.0 g/dL    Hematocrit 40.0 (L) 42.0 - 52.0 %    MCV 87.7 81.4 - 97.8 fL    MCH 30.3 27.0 - 33.0 pg    MCHC 34.5 33.7 - 35.3 g/dL    RDW 41.0 35.9 - 50.0 fL    Platelet Count 291 164 - 446 K/uL    MPV 9.4 9.0 - 12.9 fL    Neutrophils-Polys 79.60 (H) 44.00 - 72.00 %    Lymphocytes 10.00 (L) 22.00 - 41.00 %    Monocytes 9.60 0.00 - 13.40 %    Eosinophils 0.10 0.00 - 6.90 %    Basophils 0.20 0.00 - 1.80 %    Immature Granulocytes 0.50 0.00 - 0.90 %    Nucleated RBC 0.00 /100 WBC    Neutrophils (Absolute) 12.38 (H) 1.82 - 7.42 K/uL    Lymphs (Absolute) 1.56 1.00 - 4.80 K/uL    Monos (Absolute) 1.49 (H) 0.00 - 0.85 K/uL    Eos (Absolute) 0.01 0.00 - 0.51 K/uL    Baso (Absolute) 0.03 0.00 - 0.12 K/uL    Immature Granulocytes (abs) 0.07 0.00 - 0.11 K/uL    NRBC (Absolute) 0.00 K/uL   Basic Metabolic Panel (BMP): Tomorrow AM    Collection Time: 10/14/22 10:07 AM   Result Value Ref Range    Sodium 138 135 - 145 mmol/L    Potassium 3.9 3.6 - 5.5 mmol/L    Chloride 104 96 - 112 mmol/L    Co2 24 20 - 33 mmol/L    Glucose 131 (H) 65 - 99 mg/dL    Bun 15 8 - 22 mg/dL    Creatinine 0.71 0.50 - 1.40 mg/dL    Calcium 8.5 8.5 - 10.5 mg/dL    Anion Gap 10.0 7.0 - 16.0    ESTIMATED GFR    Collection Time: 10/14/22 10:07 AM   Result Value Ref Range    GFR (CKD-EPI) 103 >60 mL/min/1.73 m 2       Fluids    Intake/Output Summary (Last 24 hours) at 10/15/2022 0843  Last data filed at 10/15/2022 0504  Gross per 24 hour   Intake 3090 ml   Output 2425 ml   Net 665 ml       Core Measures & Quality Metrics  Labs reviewed, Medications reviewed and Radiology images reviewed  Barajas catheter: Urinary Tract Retention or Urinary Tract Obstruction      DVT Prophylaxis: Enoxaparin (Lovenox)  DVT prophylaxis - mechanical: SCDs  Ulcer prophylaxis: Not indicated      RAP Score Total: 5  CAGE Results: negative Blood Alcohol>0.08: no     Assessment/Plan  Traumatic closed displaced fracture of distal end of radius and ulna, left, initial encounter- (present on admission)  Assessment & Plan  Comminuted intra-articular distal radius fracture on left.    Significant displacement of large radial sided fracture fragment.  Comminuted fracture of distal ulna.  Splinted at referring facility.  Reduced in ED with conscious sedation  CT bilateral wrists ordered  Definitive operative reduction and stabilization pending.  Weight bearing status - Nonweightbearing RHIANNON Ocampo MD. Orthopedic Surgeon. [facility].     Closed fracture distal radius and ulna, right, initial encounter- (present on admission)  Assessment & Plan  Comminuted dorsally angulated intra-articular distal radius fracture with impaction.    Mildly distracted fracture of the base of the ulnar styloid.  Splinted at referring facility  Reduced in ED with conscious sedation  CT bilateral wrists ordered  Definitive operative reduction and stabilization pending.  Weight bearing status - Nonweightbearing DEEP Ocampo MD. Orthopedic Surgeon. [facility].     Closed fracture of third cervical vertebra, initial encounter (HCC)- (present on admission)  Assessment & Plan  Fracture of the anterior inferior corner of the C3 vertebral  body with slight anterior displacement approximately 3 mm.  Equivocal nondisplaced fracture line extending to superior endplate of the vertebral body.  Left C3-4 facet mildly widened with posterior displacement of C3 facet in relation to C4  CTA neck within normal limits  Non-operative management.   Cervical immobilization.   Recommend rigid cervical orthosis. Follow up in clinic 6 weeks with AP / lateral cervical spine xrays  Garrett Edgar MD, PhD. Neurosurgeon. [facility].      Multiple facial fractures, closed, initial encounter (HCC)- (present on admission)  Assessment & Plan  Fractures of the anterior, medial and superior maxillary sinuses bilaterally, fracture of the right lateral maxillary sinus, bilateral nasal fractures.  Comminuted frontal sinus fracture which appears to involve superior orbital walls bilaterally.    Comminuted displaced fracture of the right superior medial orbit, no retro-orbital hematoma.  Bilateral pterygoid plate fractures.  LeForte III type fracture  Keep HOB elevated  10/17 tentative plan repair of facial fractures.  Huey Ahuja MD. Plastic Surgeon. [facility].     Lip laceration- (present on admission)  Assessment & Plan  Repaired in ED with absorbable suture     No contraindication to deep vein thrombosis (DVT) prophylaxis- (present on admission)  Assessment & Plan  Prophylactic anticoagulation for thrombotic prevention initially contraindicated secondary to elevated bleeding risk.  10/14 Prophylactic dose enoxaparin initiated.       Finger dislocation, initial encounter- (present on admission)  Assessment & Plan  Dorsal dislocation of the PIP joint of the long finger without associated fracture.  Reduced at referring facility.     Trauma- (present on admission)  Assessment & Plan  Fall while hiking  Trauma Green Transfer Activation.  Marck Marquez MD. Trauma Surgery.         Discussed patient condition with RN, Patient, and trauma  surgery, Dr. Marquez.

## 2022-10-15 NOTE — PROGRESS NOTES
Report received from Eliel SANDOVAL, assumed care at 1900  A0x4  Pt declines any SOB on 2L oxymask, chest pain, new onset of numbness/tingling  Pt rates pain at 8/10, on a scale of 1-10, pt medicated per MAR  Barajas in place with adequate output  Pt has + flatus, + bowel sounds, BM PTA  Pt NWB to bilateral upper extremities with casts in place, C Collar in place.   Pt is NPO, pt denies any nausea/vomiting  Plan of care discussed, all questions answered.Call light is within reach, treaded slipper socks on, bed in lowest/locked position, hourly rounding in place, all needs met at this time.

## 2022-10-15 NOTE — CONSULTS
DATE OF SERVICE:  10/14/2022     CHIEF COMPLAINT:  Multiple fractures of the facial skeleton.     BRIEF HISTORY:  The patient is 62.  He fell while hiking.  In his words, he   face planted on a rock.  The patient says that he did not have any difficulty   with his vision.  He says he has no loss of vision, blurred vision, double   vision.  The patient says he had a significant amount of bleeding from his   nose.  The patient says he has difficulty breathing through the right side of   his nose primarily because of a lot of blood, which is dried and crusted   within his nose.  The patient says he does not necessarily note any numbness   over his facial skeleton.  The patient does report that his teeth do not come   together normally at all.     PAST MEDICAL HISTORY:  The patient says his medical history is negative.     PAST SURGICAL HISTORY:  Noncontributory.     MEDICATIONS:  None.     ALLERGIES:  None.     HABITS:  The patient does not smoke cigarettes.  He occasionally drinks   alcohol.  He occasionally consumes marijuana edibles.     SOCIAL HISTORY:  The patient is single.  He resides in [place].  He is   retired.     FAMILY HISTORY:  Noncontributory.     REVIEW OF SYSTEMS:  The patient is asked a greater than 12-point review of   systems.  This includes head, eyes, ears, mouth, throat, lungs, heart,   gastrointestinal, genitourinary, neurologic, psychiatric, constitutional,   cutaneous and others.  The patient answers them negatively or they are   noncontributory or already mentioned in the patient's history.     PHYSICAL EXAMINATION:  GENERAL:  The patient is well nourished and well developed.  He is in no acute   distress.  VITAL SIGNS:  Temperature is 36.9, heart rate 69, respiratory rate 16, blood   pressure 151/90, O2 sats 96% on 2 liters per [employer].  HEENT:  The patient's head has been obviously traumatized.  There is   ecchymosis of the left eye.  There are abrasions on the forehead and nose.     There is no enophthalmus.  There is no exophthalmus.  Extraocular motions are   intact.  Pupils are equal, round and reactive to light.  The nose is midline   but appears to be somewhat mobile.  Intranasal examination is difficult   because of all the dried blood.  The nasal septum does not appear to be   remarkably displaced.  There does not appear to be a septal hematoma but again   difficult to see completely because of degree of bleeding that had occurred   within the nose, primarily on the right.  Facial skeleton does not have any   palpable step-offs and is relatively symmetric.  Intraoral examination reveals   malocclusion with an open bite on the right hand side.  NECK:  The neck is supple.  There is no adenopathy.  There is no jugular   venous distention.  LUNGS:  Clear to auscultation bilaterally.  HEART:  Regular rate.  There is no murmur.  ABDOMEN:  Soft and nontender.  There are no masses.  There is no guarding.    There is no rebound.  There is no organomegaly.  BACK:  The back is nontender in the bony midline.  There is no CVA tenderness.  GENITOURINARY:  Deferred.  EXTREMITIES:  Both upper extremities are in splints.  NEUROLOGIC:  The patient is grossly intact.  PSYCHIATRIC:  The patient is of normal mood and affect.     DIAGNOSTIC DATA:  Review of the radiographs, the patient has [place]   configuration fractures.  These are bilateral.  They are minimally displaced.    Additionally, the patient has outer table of the frontal bone fractures with   minimal depression.     ASSESSMENT AND PLAN:  1.  Frontal bone fractures do not require treatment.  2.  Le [place] fractures:  The patient has Le [place] configuration fractures with   malocclusion.  The patient will require reduction of his fractures with   internal fixation after I placed the patient in interdental fixation at least   temporarily.  I have discussed with the patient the risks, benefits and   alternatives of operative treatment of his multiple  facial bone fractures.  We   will shoot for this on Monday at the end of the day.  The patient understands   and agrees to proceed with surgery.        ______________________________  MD TISHA OGDEN/THERESE    DD:  10/14/2022 15:54  DT:  10/14/2022 19:37    Job#:  [id number]

## 2022-10-15 NOTE — CARE PLAN
Problem: Knowledge Deficit - Standard  Goal: Patient and family/care givers will demonstrate understanding of plan of care, disease process/condition, diagnostic tests and medications  Outcome: Progressing     Problem: Pain - Standard  Goal: Alleviation of pain or a reduction in pain to the patient’s comfort goal  Outcome: Progressing     Problem: Fall Risk  Goal: Patient will remain free from falls  Outcome: Progressing   The patient is Stable - Low risk of patient condition declining or worsening    Shift Goals  Clinical Goals: Pain Control  Patient Goals: Advancement of DIet/ Hydration  Family Goals: N/A    Progress made toward(s) clinical / shift goals:  Diet advanced to full liquids, medicated patient per MAR     Patient is not progressing towards the following goals:

## 2022-10-15 NOTE — PROGRESS NOTES
"B UEs examined today. Warm and well perfused    Denies angina, dyspnea, fever or leg pain.    /71   Pulse 73   Temp 36.9 °C (98.4 °F) (Temporal)   Resp 16   Ht 1.803 m (5' 11\")   Wt 83.9 kg (185 lb)   SpO2 95%   BMI 25.80 kg/m²     Alert , pleasant, friends at bedside.    Bilateral RUE splint dressings clean, dry, and intact. Patient clearly moves all five fingers without issue . Sensation is intact to light touch throughout median, ulnar, and radial nerve distributions. Capillary refill less than 2 seconds. No arm or hand discomfort.    Ortho team to manage wound care beneath all splints. Call [phone] if concerns    Plan for OR today with [facility]  Hand surgery TBD with Dr. Comer    "

## 2022-10-15 NOTE — CARE PLAN
Problem: Knowledge Deficit - Standard  Goal: Patient and family/care givers will demonstrate understanding of plan of care, disease process/condition, diagnostic tests and medications  Outcome: Progressing     Problem: Pain - Standard  Goal: Alleviation of pain or a reduction in pain to the patient’s comfort goal  Outcome: Progressing   The patient is Stable - Low risk of patient condition declining or worsening    Shift Goals  Clinical Goals: pain control, wean o2  Patient Goals: pain control, rest  Family Goals: N/A    Progress made toward(s) clinical / shift goals:  Pta pain managed with prn pain medication.  POC discussed with pt, pt verbalized understanding of plan.       Secondary Intention Text (Leave Blank If You Do Not Want): The defect will heal with secondary intention.

## 2022-10-16 LAB
ANION GAP SERPL CALC-SCNC: 9 MMOL/L (ref 7–16)
BASOPHILS # BLD AUTO: 0.3 % (ref 0–1.8)
BASOPHILS # BLD: 0.04 K/UL (ref 0–0.12)
BUN SERPL-MCNC: 12 MG/DL (ref 8–22)
CALCIUM SERPL-MCNC: 8.7 MG/DL (ref 8.5–10.5)
CHLORIDE SERPL-SCNC: 101 MMOL/L (ref 96–112)
CO2 SERPL-SCNC: 26 MMOL/L (ref 20–33)
CREAT SERPL-MCNC: 0.45 MG/DL (ref 0.5–1.4)
EOSINOPHIL # BLD AUTO: 0.06 K/UL (ref 0–0.51)
EOSINOPHIL NFR BLD: 0.5 % (ref 0–6.9)
ERYTHROCYTE [DISTWIDTH] IN BLOOD BY AUTOMATED COUNT: 40 FL (ref 35.9–50)
GFR SERPLBLD CREATININE-BSD FMLA CKD-EPI: 118 ML/MIN/1.73 M 2
GLUCOSE SERPL-MCNC: 99 MG/DL (ref 65–99)
HCT VFR BLD AUTO: 36.6 % (ref 42–52)
HGB BLD-MCNC: 12.4 G/DL (ref 14–18)
IMM GRANULOCYTES # BLD AUTO: 0.07 K/UL (ref 0–0.11)
IMM GRANULOCYTES NFR BLD AUTO: 0.5 % (ref 0–0.9)
LYMPHOCYTES # BLD AUTO: 1.83 K/UL (ref 1–4.8)
LYMPHOCYTES NFR BLD: 13.8 % (ref 22–41)
MCH RBC QN AUTO: 29.6 PG (ref 27–33)
MCHC RBC AUTO-ENTMCNC: 33.9 G/DL (ref 33.7–35.3)
MCV RBC AUTO: 87.4 FL (ref 81.4–97.8)
MONOCYTES # BLD AUTO: 1.04 K/UL (ref 0–0.85)
MONOCYTES NFR BLD AUTO: 7.8 % (ref 0–13.4)
NEUTROPHILS # BLD AUTO: 10.23 K/UL (ref 1.82–7.42)
NEUTROPHILS NFR BLD: 77.1 % (ref 44–72)
NRBC # BLD AUTO: 0 K/UL
NRBC BLD-RTO: 0 /100 WBC
PLATELET # BLD AUTO: 241 K/UL (ref 164–446)
PMV BLD AUTO: 10.2 FL (ref 9–12.9)
POTASSIUM SERPL-SCNC: 3.5 MMOL/L (ref 3.6–5.5)
RBC # BLD AUTO: 4.19 M/UL (ref 4.7–6.1)
SODIUM SERPL-SCNC: 136 MMOL/L (ref 135–145)
WBC # BLD AUTO: 13.3 K/UL (ref 4.8–10.8)

## 2022-10-16 PROCEDURE — 99231 SBSQ HOSP IP/OBS SF/LOW 25: CPT | Mod: 57 | Performed by: SURGERY

## 2022-10-16 PROCEDURE — 700102 HCHG RX REV CODE 250 W/ 637 OVERRIDE(OP): Performed by: SPECIALIST

## 2022-10-16 PROCEDURE — 770001 HCHG ROOM/CARE - MED/SURG/GYN PRIV*

## 2022-10-16 PROCEDURE — 80048 BASIC METABOLIC PNL TOTAL CA: CPT

## 2022-10-16 PROCEDURE — 700102 HCHG RX REV CODE 250 W/ 637 OVERRIDE(OP): Performed by: NURSE PRACTITIONER

## 2022-10-16 PROCEDURE — 700105 HCHG RX REV CODE 258: Performed by: SPECIALIST

## 2022-10-16 PROCEDURE — 700101 HCHG RX REV CODE 250: Performed by: SPECIALIST

## 2022-10-16 PROCEDURE — 36415 COLL VENOUS BLD VENIPUNCTURE: CPT

## 2022-10-16 PROCEDURE — 99232 SBSQ HOSP IP/OBS MODERATE 35: CPT | Performed by: NURSE PRACTITIONER

## 2022-10-16 PROCEDURE — A9270 NON-COVERED ITEM OR SERVICE: HCPCS | Performed by: NURSE PRACTITIONER

## 2022-10-16 PROCEDURE — 85025 COMPLETE CBC W/AUTO DIFF WBC: CPT

## 2022-10-16 PROCEDURE — A9270 NON-COVERED ITEM OR SERVICE: HCPCS | Performed by: SPECIALIST

## 2022-10-16 PROCEDURE — 700111 HCHG RX REV CODE 636 W/ 250 OVERRIDE (IP): Performed by: SURGERY

## 2022-10-16 RX ADMIN — Medication 1 EACH: at 05:42

## 2022-10-16 RX ADMIN — DOCUSATE SODIUM 100 MG: 100 CAPSULE, LIQUID FILLED ORAL at 05:42

## 2022-10-16 RX ADMIN — TAMSULOSIN HYDROCHLORIDE 0.4 MG: 0.4 CAPSULE ORAL at 08:45

## 2022-10-16 RX ADMIN — ENOXAPARIN SODIUM 30 MG: 30 INJECTION SUBCUTANEOUS at 16:53

## 2022-10-16 RX ADMIN — GABAPENTIN 100 MG: 100 CAPSULE ORAL at 14:35

## 2022-10-16 RX ADMIN — Medication 1 EACH: at 16:53

## 2022-10-16 RX ADMIN — Medication 1 EACH: at 11:40

## 2022-10-16 RX ADMIN — GABAPENTIN 100 MG: 100 CAPSULE ORAL at 05:42

## 2022-10-16 RX ADMIN — METAXALONE 400 MG: 800 TABLET ORAL at 05:41

## 2022-10-16 RX ADMIN — ENOXAPARIN SODIUM 30 MG: 30 INJECTION SUBCUTANEOUS at 05:41

## 2022-10-16 RX ADMIN — METAXALONE 400 MG: 800 TABLET ORAL at 11:39

## 2022-10-16 RX ADMIN — ACETAMINOPHEN 650 MG: 325 TABLET, FILM COATED ORAL at 23:16

## 2022-10-16 RX ADMIN — ACETAMINOPHEN 650 MG: 325 TABLET, FILM COATED ORAL at 11:39

## 2022-10-16 RX ADMIN — METAXALONE 400 MG: 800 TABLET ORAL at 16:53

## 2022-10-16 RX ADMIN — OXYCODONE 5 MG: 5 TABLET ORAL at 16:52

## 2022-10-16 RX ADMIN — SODIUM CHLORIDE, POTASSIUM CHLORIDE, SODIUM LACTATE AND CALCIUM CHLORIDE: 600; 310; 30; 20 INJECTION, SOLUTION INTRAVENOUS at 11:13

## 2022-10-16 RX ADMIN — POLYETHYLENE GLYCOL 3350 1 PACKET: 17 POWDER, FOR SOLUTION ORAL at 11:38

## 2022-10-16 RX ADMIN — ACETAMINOPHEN 650 MG: 325 TABLET, FILM COATED ORAL at 16:52

## 2022-10-16 RX ADMIN — ACETAMINOPHEN 650 MG: 325 TABLET, FILM COATED ORAL at 05:42

## 2022-10-16 RX ADMIN — GABAPENTIN 100 MG: 100 CAPSULE ORAL at 21:16

## 2022-10-16 ASSESSMENT — PAIN DESCRIPTION - PAIN TYPE
TYPE: ACUTE PAIN

## 2022-10-16 ASSESSMENT — ENCOUNTER SYMPTOMS
SENSORY CHANGE: 0
VOMITING: 0
SHORTNESS OF BREATH: 0
ROS GI COMMENTS: 10/15 BM
NAUSEA: 0
FEVER: 0
SPEECH CHANGE: 0
PALPITATIONS: 0
CHILLS: 0
HEADACHES: 0
COUGH: 0
ABDOMINAL PAIN: 0
DIZZINESS: 0
TINGLING: 0
BLURRED VISION: 0
BACK PAIN: 0
TREMORS: 0
MYALGIAS: 1
DOUBLE VISION: 0
NECK PAIN: 1

## 2022-10-16 ASSESSMENT — COGNITIVE AND FUNCTIONAL STATUS - GENERAL
STANDING UP FROM CHAIR USING ARMS: A LITTLE
DRESSING REGULAR UPPER BODY CLOTHING: A LOT
EATING MEALS: A LOT
MOBILITY SCORE: 17
PERSONAL GROOMING: A LOT
DRESSING REGULAR LOWER BODY CLOTHING: A LOT
DAILY ACTIVITIY SCORE: 13
TOILETING: A LITTLE
CLIMB 3 TO 5 STEPS WITH RAILING: A LITTLE
SUGGESTED CMS G CODE MODIFIER DAILY ACTIVITY: CL
SUGGESTED CMS G CODE MODIFIER MOBILITY: CK
HELP NEEDED FOR BATHING: A LOT
TURNING FROM BACK TO SIDE WHILE IN FLAT BAD: A LOT
MOVING FROM LYING ON BACK TO SITTING ON SIDE OF FLAT BED: A LITTLE
WALKING IN HOSPITAL ROOM: A LITTLE
MOVING TO AND FROM BED TO CHAIR: A LITTLE

## 2022-10-16 NOTE — CARE PLAN
The patient is Stable - Low risk of patient condition declining or worsening    Shift Goals  Clinical Goals: wean O2  Patient Goals: pain control  Family Goals: N/A    Progress made toward(s) clinical / shift goals:  Pain frequently assessed. Medicated per MAR. Reinforced call light use to prevent falls. Pt verbalizes understanding of plan of care.    Problem: Knowledge Deficit - Standard  Goal: Patient and family/care givers will demonstrate understanding of plan of care, disease process/condition, diagnostic tests and medications  Outcome: Progressing     Problem: Pain - Standard  Goal: Alleviation of pain or a reduction in pain to the patient’s comfort goal  Outcome: Progressing     Problem: Fall Risk  Goal: Patient will remain free from falls  Outcome: Progressing       Patient is not progressing towards the following goals:

## 2022-10-16 NOTE — CARE PLAN
Problem: Knowledge Deficit - Standard  Goal: Patient and family/care givers will demonstrate understanding of plan of care, disease process/condition, diagnostic tests and medications  Outcome: Progressing     Problem: Pain - Standard  Goal: Alleviation of pain or a reduction in pain to the patient’s comfort goal  Outcome: Progressing   The patient is Stable - Low risk of patient condition declining or worsening    Shift Goals  Clinical Goals: pain control, wean o2  Patient Goals: pain control  Family Goals: N/A    Progress made toward(s) clinical / shift goals:  pts pain managed with prn pain medication. POC discussed with pt, pt verbalized understanding of plan.

## 2022-10-16 NOTE — PROGRESS NOTES
Trauma / Surgical Daily Progress Note    Date of Service  10/16/2022    Chief Complaint  62 y.o. male admitted 10/13/2022 with Trauma, hiking and fall, spinal fracture, facial fxs, bilateral wrists fxs    Interval Events  Tolerating full liquid diet    Definitive wrist and facials repairs pending     Review of Systems  Review of Systems   Constitutional:  Negative for chills and fever.   Eyes:  Negative for blurred vision and double vision.   Respiratory:  Negative for cough and shortness of breath.    Cardiovascular:  Negative for palpitations.   Gastrointestinal:  Negative for abdominal pain, nausea and vomiting.        10/15 BM   Genitourinary:         Voiding    Musculoskeletal:  Positive for joint pain, myalgias and neck pain. Negative for back pain.   Neurological:  Negative for dizziness, tingling, tremors, sensory change, speech change and headaches.      Vital Signs  Temp:  [36.4 °C (97.6 °F)-37 °C (98.6 °F)] 36.8 °C (98.2 °F)  Pulse:  [70-95] 70  Resp:  [16-18] 18  BP: (130-146)/(68-79) 145/79  SpO2:  [90 %-95 %] 93 %    Physical Exam  Physical Exam  Vitals and nursing note reviewed.   Constitutional:       General: He is awake. He is not in acute distress.     Appearance: He is not toxic-appearing.      Interventions: Cervical collar and nasal cannula in place.   HENT:      Head:      Comments: Significant facial trauma.  Lip laceration approximated.      Nose: Congestion present.      Comments: Dried blood to nares      Mouth/Throat:      Pharynx: Oropharynx is clear.   Eyes:      Conjunctiva/sclera: Conjunctivae normal.      Comments: Bilateral periorbital ecchymosis.    Cardiovascular:      Rate and Rhythm: Normal rate and regular rhythm.      Pulses: Normal pulses.   Pulmonary:      Effort: Pulmonary effort is normal. No respiratory distress.   Chest:      Chest wall: No tenderness.   Abdominal:      General: There is no distension.      Palpations: Abdomen is soft.      Tenderness: There is no  abdominal tenderness. There is no guarding.   Genitourinary:     Comments: Barajas in place with yellow urine  Musculoskeletal:         General: Tenderness and signs of injury present.      Comments: Splint to bilateral upper extremities, wiggles fingers bilaterally    Skin:     General: Skin is warm and dry.      Capillary Refill: Capillary refill takes less than 2 seconds.      Comments: Scattered abrasions    Neurological:      Mental Status: He is alert and oriented to person, place, and time.   Psychiatric:         Behavior: Behavior normal. Behavior is cooperative.       Laboratory  No results found for this or any previous visit (from the past 24 hour(s)).    Fluids    Intake/Output Summary (Last 24 hours) at 10/16/2022 1053  Last data filed at 10/16/2022 0400  Gross per 24 hour   Intake 2080 ml   Output 2600 ml   Net -520 ml       Core Measures & Quality Metrics  Labs reviewed, Medications reviewed and Radiology images reviewed  Barajas catheter: Urinary Tract Retention or Urinary Tract Obstruction      DVT Prophylaxis: Enoxaparin (Lovenox)  DVT prophylaxis - mechanical: SCDs  Ulcer prophylaxis: Not indicated      RAP Score Total: 5  CAGE Results: negative Blood Alcohol>0.08: no     Assessment/Plan  Multiple facial fractures, closed, initial encounter (HCC)- (present on admission)  Assessment & Plan  Fractures of the anterior, medial and superior maxillary sinuses bilaterally, fracture of the right lateral maxillary sinus, bilateral nasal fractures.  Comminuted frontal sinus fracture which appears to involve superior orbital walls bilaterally.    Comminuted displaced fracture of the right superior medial orbit, no retro-orbital hematoma.  Bilateral pterygoid plate fractures.  LeForte III type fracture  Keep HOB elevated  10/17 tentative plan repair of facial fractures.  Huey Ahuja MD. Plastic Surgeon. [facility].      Urinary retention- (present on admission)  Assessment & Plan  10/14  Barajas placed for retention  10/15 Start Flomax    Traumatic closed displaced fracture of distal end of radius and ulna, left, initial encounter- (present on admission)  Assessment & Plan  Comminuted intra-articular distal radius fracture on left.    Significant displacement of large radial sided fracture fragment.  Comminuted fracture of distal ulna.  Splinted at referring facility.  Reduced in ED with conscious sedation  CT bilateral wrists ordered  Definitive operative reduction and stabilization pending.  Weight bearing status - Nonweightbearing TIM.  Deyvi Ocampo MD. Orthopedic Surgeon. [facility].     Closed fracture distal radius and ulna, right, initial encounter- (present on admission)  Assessment & Plan  Comminuted dorsally angulated intra-articular distal radius fracture with impaction.    Mildly distracted fracture of the base of the ulnar styloid.  Splinted at referring facility  Reduced in ED with conscious sedation  CT bilateral wrists ordered  Definitive operative reduction and stabilization pending.  Weight bearing status - Nonweightbearing FAVIO.  Deyvi Ocampo MD. Orthopedic Surgeon. [facility].     Closed fracture of third cervical vertebra, initial encounter (HCC)- (present on admission)  Assessment & Plan  Fracture of the anterior inferior corner of the C3 vertebral body with slight anterior displacement approximately 3 mm.  Equivocal nondisplaced fracture line extending to superior endplate of the vertebral body.  Left C3-4 facet mildly widened with posterior displacement of C3 facet in relation to C4  CTA neck within normal limits  Non-operative management.   Cervical immobilization.   Recommend rigid cervical orthosis. Follow up in clinic 6 weeks with AP / lateral cervical spine xrays  Garrett Edgar MD, PhD. Neurosurgeon. [facility].       Lip laceration- (present on admission)  Assessment & Plan  Repaired in ED with absorbable suture     No  contraindication to deep vein thrombosis (DVT) prophylaxis- (present on admission)  Assessment & Plan  Prophylactic anticoagulation for thrombotic prevention initially contraindicated secondary to elevated bleeding risk.  10/14 Prophylactic dose enoxaparin initiated.       Finger dislocation, initial encounter- (present on admission)  Assessment & Plan  Dorsal dislocation of the PIP joint of the long finger without associated fracture.  Reduced at referring facility.     Trauma- (present on admission)  Assessment & Plan  Fall while hiking  Trauma Green Transfer Activation.  Marck Marquez MD. Trauma Surgery.         Discussed patient condition with RN, Patient, and trauma surgery, Dr. STEVE Marquez.

## 2022-10-16 NOTE — PROGRESS NOTES
"B UEs examined today. Warm and well perfused    Denies angina, dyspnea, fever or leg pain.    BP (!) 145/79   Pulse 70   Temp 36.8 °C (98.2 °F) (Temporal)   Resp 18   Ht 1.803 m (5' 11\")   Wt 83.9 kg (185 lb)   SpO2 93%   BMI 25.80 kg/m²     Alert , pleasant, friends at bedside.    Bilateral RUE splint dressings clean, dry, and intact. Patient clearly moves all five fingers without issue . Sensation is intact to light touch throughout median, ulnar, and radial nerve distributions. Capillary refill less than 2 seconds. No arm or hand discomfort.    Ortho team to manage wound care beneath all splints. Call [phone] if concerns    Plan for Hand surgery tomorrow 5 PM with Dr. Comer, NPO p 8:30 AM    "

## 2022-10-16 NOTE — PROGRESS NOTES
Received report from previous shift RN  Assessment complete.  A&O x 4. Patient calls appropriately.  Patient ambulates with x1 assist. NWB on BUE Patient has 2/10 pain. Pain managed with prescribed medications.  Denies N&V. Tolerating full liquid diet.  Skin per flowsheets.  + void via tatum, + flatus, + BM.  Patient denies SOB.  SCD's on.  Patient pleasant and cooperative with plan of care.  Review plan with of care with patient. Call light and personal belongings with in reach. Hourly rounding in place. All needs met at this time.

## 2022-10-16 NOTE — PROGRESS NOTES
Report received from Eliel SANDOVAL, assumed care at 1900  A0x4  Pt declines any SOB on 2L NC, chest pain, new onset of numbness/tingling  Pt rates pain at 4/10, on a scale of 1-10, pt medicated per MAR  Barajas in place with adequate output  Pt has + flatus, + bowel sounds, BM 10/15  Pt NWB to bilateral upper extremities with casts in place, C Collar in place.   Pt is tolerating a full liquid diet, pt denies any nausea/vomiting  Plan of care discussed, all questions answered.Call light is within reach, treaded slipper socks on, bed in lowest/locked position, hourly rounding in place, all needs met at this time.

## 2022-10-17 ENCOUNTER — APPOINTMENT (OUTPATIENT)
Dept: RADIOLOGY | Facility: MEDICAL CENTER | Age: 62
DRG: 141 | End: 2022-10-17
Attending: ORTHOPAEDIC SURGERY
Payer: COMMERCIAL

## 2022-10-17 ENCOUNTER — ANESTHESIA EVENT (OUTPATIENT)
Dept: SURGERY | Facility: MEDICAL CENTER | Age: 62
DRG: 141 | End: 2022-10-17
Payer: COMMERCIAL

## 2022-10-17 ENCOUNTER — ANESTHESIA (OUTPATIENT)
Dept: SURGERY | Facility: MEDICAL CENTER | Age: 62
DRG: 141 | End: 2022-10-17
Payer: COMMERCIAL

## 2022-10-17 LAB
ANION GAP SERPL CALC-SCNC: 10 MMOL/L (ref 7–16)
BASOPHILS # BLD AUTO: 0.5 % (ref 0–1.8)
BASOPHILS # BLD: 0.05 K/UL (ref 0–0.12)
BUN SERPL-MCNC: 11 MG/DL (ref 8–22)
CALCIUM SERPL-MCNC: 8.3 MG/DL (ref 8.5–10.5)
CHLORIDE SERPL-SCNC: 104 MMOL/L (ref 96–112)
CO2 SERPL-SCNC: 26 MMOL/L (ref 20–33)
CREAT SERPL-MCNC: 0.5 MG/DL (ref 0.5–1.4)
EOSINOPHIL # BLD AUTO: 0.1 K/UL (ref 0–0.51)
EOSINOPHIL NFR BLD: 0.9 % (ref 0–6.9)
ERYTHROCYTE [DISTWIDTH] IN BLOOD BY AUTOMATED COUNT: 39.8 FL (ref 35.9–50)
GFR SERPLBLD CREATININE-BSD FMLA CKD-EPI: 114 ML/MIN/1.73 M 2
GLUCOSE SERPL-MCNC: 107 MG/DL (ref 65–99)
HCT VFR BLD AUTO: 35.3 % (ref 42–52)
HGB BLD-MCNC: 12.1 G/DL (ref 14–18)
IMM GRANULOCYTES # BLD AUTO: 0.03 K/UL (ref 0–0.11)
IMM GRANULOCYTES NFR BLD AUTO: 0.3 % (ref 0–0.9)
LYMPHOCYTES # BLD AUTO: 2.03 K/UL (ref 1–4.8)
LYMPHOCYTES NFR BLD: 18.3 % (ref 22–41)
MAGNESIUM SERPL-MCNC: 1.8 MG/DL (ref 1.5–2.5)
MCH RBC QN AUTO: 30.2 PG (ref 27–33)
MCHC RBC AUTO-ENTMCNC: 34.3 G/DL (ref 33.7–35.3)
MCV RBC AUTO: 88 FL (ref 81.4–97.8)
MONOCYTES # BLD AUTO: 1 K/UL (ref 0–0.85)
MONOCYTES NFR BLD AUTO: 9 % (ref 0–13.4)
NEUTROPHILS # BLD AUTO: 7.88 K/UL (ref 1.82–7.42)
NEUTROPHILS NFR BLD: 71 % (ref 44–72)
NRBC # BLD AUTO: 0 K/UL
NRBC BLD-RTO: 0 /100 WBC
PHOSPHATE SERPL-MCNC: 3 MG/DL (ref 2.5–4.5)
PLATELET # BLD AUTO: 225 K/UL (ref 164–446)
PMV BLD AUTO: 10.4 FL (ref 9–12.9)
POTASSIUM SERPL-SCNC: 3.5 MMOL/L (ref 3.6–5.5)
RBC # BLD AUTO: 4.01 M/UL (ref 4.7–6.1)
SODIUM SERPL-SCNC: 140 MMOL/L (ref 135–145)
WBC # BLD AUTO: 11.1 K/UL (ref 4.8–10.8)

## 2022-10-17 PROCEDURE — 73100 X-RAY EXAM OF WRIST: CPT | Mod: RT

## 2022-10-17 PROCEDURE — 160035 HCHG PACU - 1ST 60 MINS PHASE I: Performed by: ORTHOPAEDIC SURGERY

## 2022-10-17 PROCEDURE — 700111 HCHG RX REV CODE 636 W/ 250 OVERRIDE (IP): Performed by: ANESTHESIOLOGY

## 2022-10-17 PROCEDURE — 700111 HCHG RX REV CODE 636 W/ 250 OVERRIDE (IP): Performed by: SURGERY

## 2022-10-17 PROCEDURE — 84100 ASSAY OF PHOSPHORUS: CPT

## 2022-10-17 PROCEDURE — 0PSH04Z REPOSITION RIGHT RADIUS WITH INTERNAL FIXATION DEVICE, OPEN APPROACH: ICD-10-PCS | Performed by: ORTHOPAEDIC SURGERY

## 2022-10-17 PROCEDURE — 160039 HCHG SURGERY MINUTES - EA ADDL 1 MIN LEVEL 3: Performed by: ORTHOPAEDIC SURGERY

## 2022-10-17 PROCEDURE — 0PSK04Z REPOSITION RIGHT ULNA WITH INTERNAL FIXATION DEVICE, OPEN APPROACH: ICD-10-PCS | Performed by: ORTHOPAEDIC SURGERY

## 2022-10-17 PROCEDURE — 0PSL04Z REPOSITION LEFT ULNA WITH INTERNAL FIXATION DEVICE, OPEN APPROACH: ICD-10-PCS | Performed by: ORTHOPAEDIC SURGERY

## 2022-10-17 PROCEDURE — 770001 HCHG ROOM/CARE - MED/SURG/GYN PRIV*

## 2022-10-17 PROCEDURE — A9270 NON-COVERED ITEM OR SERVICE: HCPCS | Performed by: SPECIALIST

## 2022-10-17 PROCEDURE — 160048 HCHG OR STATISTICAL LEVEL 1-5: Performed by: ORTHOPAEDIC SURGERY

## 2022-10-17 PROCEDURE — 700102 HCHG RX REV CODE 250 W/ 637 OVERRIDE(OP): Performed by: NURSE PRACTITIONER

## 2022-10-17 PROCEDURE — 700102 HCHG RX REV CODE 250 W/ 637 OVERRIDE(OP): Performed by: ANESTHESIOLOGY

## 2022-10-17 PROCEDURE — 700105 HCHG RX REV CODE 258: Performed by: SPECIALIST

## 2022-10-17 PROCEDURE — 99232 SBSQ HOSP IP/OBS MODERATE 35: CPT | Performed by: NURSE PRACTITIONER

## 2022-10-17 PROCEDURE — 700111 HCHG RX REV CODE 636 W/ 250 OVERRIDE (IP): Performed by: SPECIALIST

## 2022-10-17 PROCEDURE — 700101 HCHG RX REV CODE 250: Performed by: SPECIALIST

## 2022-10-17 PROCEDURE — 0PSJ04Z REPOSITION LEFT RADIUS WITH INTERNAL FIXATION DEVICE, OPEN APPROACH: ICD-10-PCS | Performed by: ORTHOPAEDIC SURGERY

## 2022-10-17 PROCEDURE — 700105 HCHG RX REV CODE 258: Performed by: ANESTHESIOLOGY

## 2022-10-17 PROCEDURE — 36415 COLL VENOUS BLD VENIPUNCTURE: CPT

## 2022-10-17 PROCEDURE — 85025 COMPLETE CBC W/AUTO DIFF WBC: CPT

## 2022-10-17 PROCEDURE — A9270 NON-COVERED ITEM OR SERVICE: HCPCS | Performed by: NURSE PRACTITIONER

## 2022-10-17 PROCEDURE — 160002 HCHG RECOVERY MINUTES (STAT): Performed by: ORTHOPAEDIC SURGERY

## 2022-10-17 PROCEDURE — 01830 ANES ARTHR/NDSC WRST/HND NOS: CPT | Performed by: ANESTHESIOLOGY

## 2022-10-17 PROCEDURE — 160009 HCHG ANES TIME/MIN: Performed by: ORTHOPAEDIC SURGERY

## 2022-10-17 PROCEDURE — 160028 HCHG SURGERY MINUTES - 1ST 30 MINS LEVEL 3: Performed by: ORTHOPAEDIC SURGERY

## 2022-10-17 PROCEDURE — 83735 ASSAY OF MAGNESIUM: CPT

## 2022-10-17 PROCEDURE — C1713 ANCHOR/SCREW BN/BN,TIS/BN: HCPCS | Performed by: ORTHOPAEDIC SURGERY

## 2022-10-17 PROCEDURE — 700102 HCHG RX REV CODE 250 W/ 637 OVERRIDE(OP): Performed by: ORTHOPAEDIC SURGERY

## 2022-10-17 PROCEDURE — 700102 HCHG RX REV CODE 250 W/ 637 OVERRIDE(OP): Performed by: SPECIALIST

## 2022-10-17 PROCEDURE — 700101 HCHG RX REV CODE 250

## 2022-10-17 PROCEDURE — 80048 BASIC METABOLIC PNL TOTAL CA: CPT

## 2022-10-17 PROCEDURE — 160036 HCHG PACU - EA ADDL 30 MINS PHASE I: Performed by: ORTHOPAEDIC SURGERY

## 2022-10-17 PROCEDURE — 110371 HCHG SHELL REV 272: Performed by: ORTHOPAEDIC SURGERY

## 2022-10-17 PROCEDURE — 73100 X-RAY EXAM OF WRIST: CPT | Mod: LT

## 2022-10-17 PROCEDURE — A9270 NON-COVERED ITEM OR SERVICE: HCPCS | Performed by: ANESTHESIOLOGY

## 2022-10-17 PROCEDURE — 700101 HCHG RX REV CODE 250: Performed by: ANESTHESIOLOGY

## 2022-10-17 PROCEDURE — A9270 NON-COVERED ITEM OR SERVICE: HCPCS | Performed by: ORTHOPAEDIC SURGERY

## 2022-10-17 PROCEDURE — 700101 HCHG RX REV CODE 250: Performed by: ORTHOPAEDIC SURGERY

## 2022-10-17 DEVICE — IMPLANTABLE DEVICE: Type: IMPLANTABLE DEVICE | Site: WRIST | Status: FUNCTIONAL

## 2022-10-17 DEVICE — SCREW CORTICAL 2.3 20MM (1TCONX5=5): Type: IMPLANTABLE DEVICE | Site: WRIST | Status: FUNCTIONAL

## 2022-10-17 DEVICE — SCREW CORTICAL 2.3 18MM (1TCONX5=5): Type: IMPLANTABLE DEVICE | Site: WRIST | Status: FUNCTIONAL

## 2022-10-17 DEVICE — SCREW CORTICAL 2.3 16MM (1TCONX5=5): Type: IMPLANTABLE DEVICE | Site: WRIST | Status: FUNCTIONAL

## 2022-10-17 DEVICE — WIRE K- SMTH .062 4 - (6TX6=36): Type: IMPLANTABLE DEVICE | Site: WRIST | Status: FUNCTIONAL

## 2022-10-17 DEVICE — PLATE PIN DORSAL ULNAR 7 HOLE (1TCONX1=1): Type: IMPLANTABLE DEVICE | Site: WRIST | Status: FUNCTIONAL

## 2022-10-17 DEVICE — WIRE K- SMTH .054 4 (6TX6=36) ---MIN ORDER $50---: Type: IMPLANTABLE DEVICE | Site: WRIST | Status: FUNCTIONAL

## 2022-10-17 DEVICE — PEG THREADED TRX 18MM (1TCONX5=5): Type: IMPLANTABLE DEVICE | Site: WRIST | Status: FUNCTIONAL

## 2022-10-17 DEVICE — SCREW CORTICAL 2.3 14MM (1TCONX5=5): Type: IMPLANTABLE DEVICE | Site: WRIST | Status: FUNCTIONAL

## 2022-10-17 DEVICE — WASHER WIRE FORM (1TCONX4=4): Type: IMPLANTABLE DEVICE | Site: WRIST | Status: FUNCTIONAL

## 2022-10-17 DEVICE — PLATE PIN DORSAL ULNAR 5 HOLE (1TCONX2=2): Type: IMPLANTABLE DEVICE | Site: WRIST | Status: FUNCTIONAL

## 2022-10-17 DEVICE — SCREW CORTICAL 2.3 10MM (1TCONX5=5): Type: IMPLANTABLE DEVICE | Site: WRIST | Status: FUNCTIONAL

## 2022-10-17 DEVICE — PIN BUTTRESS VOLAR 42MM (1TCONX2=2): Type: IMPLANTABLE DEVICE | Site: WRIST | Status: FUNCTIONAL

## 2022-10-17 DEVICE — SCREW CORTICAL 2.3 12MM (1TCONX5=5): Type: IMPLANTABLE DEVICE | Site: WRIST | Status: FUNCTIONAL

## 2022-10-17 DEVICE — PLATE WRIST HOOK VOLAR 6 HOLE (1TCONX2=2): Type: IMPLANTABLE DEVICE | Site: WRIST | Status: FUNCTIONAL

## 2022-10-17 RX ORDER — OXYCODONE HCL 5 MG/5 ML
5 SOLUTION, ORAL ORAL
Status: COMPLETED | OUTPATIENT
Start: 2022-10-17 | End: 2022-10-17

## 2022-10-17 RX ORDER — DEXAMETHASONE SODIUM PHOSPHATE 4 MG/ML
INJECTION, SOLUTION INTRA-ARTICULAR; INTRALESIONAL; INTRAMUSCULAR; INTRAVENOUS; SOFT TISSUE PRN
Status: DISCONTINUED | OUTPATIENT
Start: 2022-10-17 | End: 2022-10-17 | Stop reason: SURG

## 2022-10-17 RX ORDER — MIDAZOLAM HYDROCHLORIDE 1 MG/ML
1 INJECTION INTRAMUSCULAR; INTRAVENOUS
Status: DISCONTINUED | OUTPATIENT
Start: 2022-10-17 | End: 2022-10-17 | Stop reason: HOSPADM

## 2022-10-17 RX ORDER — AMOXICILLIN 250 MG
1 CAPSULE ORAL
Status: DISCONTINUED | OUTPATIENT
Start: 2022-10-17 | End: 2022-10-18

## 2022-10-17 RX ORDER — CELECOXIB 200 MG/1
200 CAPSULE ORAL 2 TIMES DAILY
Status: DISPENSED | OUTPATIENT
Start: 2022-10-17 | End: 2022-10-22

## 2022-10-17 RX ORDER — DIPHENHYDRAMINE HYDROCHLORIDE 50 MG/ML
12.5 INJECTION INTRAMUSCULAR; INTRAVENOUS
Status: DISCONTINUED | OUTPATIENT
Start: 2022-10-17 | End: 2022-10-17 | Stop reason: HOSPADM

## 2022-10-17 RX ORDER — POLYETHYLENE GLYCOL 3350 17 G/17G
1 POWDER, FOR SOLUTION ORAL 2 TIMES DAILY PRN
Status: DISCONTINUED | OUTPATIENT
Start: 2022-10-17 | End: 2022-10-18

## 2022-10-17 RX ORDER — ACETAMINOPHEN 325 MG/1
650 TABLET ORAL EVERY 6 HOURS PRN
Status: DISCONTINUED | OUTPATIENT
Start: 2022-10-23 | End: 2022-10-18

## 2022-10-17 RX ORDER — AMOXICILLIN 250 MG
1 CAPSULE ORAL NIGHTLY
Status: DISCONTINUED | OUTPATIENT
Start: 2022-10-17 | End: 2022-10-18

## 2022-10-17 RX ORDER — ACETAMINOPHEN 325 MG/1
650 TABLET ORAL EVERY 6 HOURS
Status: DISCONTINUED | OUTPATIENT
Start: 2022-10-18 | End: 2022-10-18

## 2022-10-17 RX ORDER — CEFAZOLIN SODIUM 1 G/3ML
INJECTION, POWDER, FOR SOLUTION INTRAMUSCULAR; INTRAVENOUS PRN
Status: DISCONTINUED | OUTPATIENT
Start: 2022-10-17 | End: 2022-10-17 | Stop reason: SURG

## 2022-10-17 RX ORDER — METOPROLOL TARTRATE 1 MG/ML
1 INJECTION, SOLUTION INTRAVENOUS
Status: DISCONTINUED | OUTPATIENT
Start: 2022-10-17 | End: 2022-10-17 | Stop reason: HOSPADM

## 2022-10-17 RX ORDER — HALOPERIDOL 5 MG/ML
1 INJECTION INTRAMUSCULAR EVERY 6 HOURS PRN
Status: DISCONTINUED | OUTPATIENT
Start: 2022-10-17 | End: 2022-10-26

## 2022-10-17 RX ORDER — ONDANSETRON 2 MG/ML
INJECTION INTRAMUSCULAR; INTRAVENOUS PRN
Status: DISCONTINUED | OUTPATIENT
Start: 2022-10-17 | End: 2022-10-17 | Stop reason: SURG

## 2022-10-17 RX ORDER — HYDRALAZINE HYDROCHLORIDE 20 MG/ML
5 INJECTION INTRAMUSCULAR; INTRAVENOUS
Status: DISCONTINUED | OUTPATIENT
Start: 2022-10-17 | End: 2022-10-17 | Stop reason: HOSPADM

## 2022-10-17 RX ORDER — CELECOXIB 200 MG/1
200 CAPSULE ORAL 2 TIMES DAILY PRN
Status: DISCONTINUED | OUTPATIENT
Start: 2022-10-22 | End: 2022-11-03 | Stop reason: HOSPADM

## 2022-10-17 RX ORDER — DOCUSATE SODIUM 100 MG/1
100 CAPSULE, LIQUID FILLED ORAL 2 TIMES DAILY
Status: DISCONTINUED | OUTPATIENT
Start: 2022-10-17 | End: 2022-10-18

## 2022-10-17 RX ORDER — BUPIVACAINE HYDROCHLORIDE 5 MG/ML
INJECTION, SOLUTION EPIDURAL; INTRACAUDAL
Status: DISCONTINUED | OUTPATIENT
Start: 2022-10-17 | End: 2022-10-17 | Stop reason: HOSPADM

## 2022-10-17 RX ORDER — HYDROMORPHONE HYDROCHLORIDE 1 MG/ML
0.2 INJECTION, SOLUTION INTRAMUSCULAR; INTRAVENOUS; SUBCUTANEOUS
Status: DISCONTINUED | OUTPATIENT
Start: 2022-10-17 | End: 2022-10-17 | Stop reason: HOSPADM

## 2022-10-17 RX ORDER — LABETALOL HYDROCHLORIDE 5 MG/ML
5 INJECTION, SOLUTION INTRAVENOUS
Status: COMPLETED | OUTPATIENT
Start: 2022-10-17 | End: 2022-10-17

## 2022-10-17 RX ORDER — HALOPERIDOL 5 MG/ML
1 INJECTION INTRAMUSCULAR
Status: DISCONTINUED | OUTPATIENT
Start: 2022-10-17 | End: 2022-10-17 | Stop reason: HOSPADM

## 2022-10-17 RX ORDER — SODIUM CHLORIDE, SODIUM LACTATE, POTASSIUM CHLORIDE, CALCIUM CHLORIDE 600; 310; 30; 20 MG/100ML; MG/100ML; MG/100ML; MG/100ML
INJECTION, SOLUTION INTRAVENOUS CONTINUOUS
Status: DISCONTINUED | OUTPATIENT
Start: 2022-10-17 | End: 2022-10-17 | Stop reason: HOSPADM

## 2022-10-17 RX ORDER — DIPHENHYDRAMINE HYDROCHLORIDE 50 MG/ML
25 INJECTION INTRAMUSCULAR; INTRAVENOUS EVERY 6 HOURS PRN
Status: DISCONTINUED | OUTPATIENT
Start: 2022-10-17 | End: 2022-10-22

## 2022-10-17 RX ORDER — ONDANSETRON 2 MG/ML
4 INJECTION INTRAMUSCULAR; INTRAVENOUS
Status: DISCONTINUED | OUTPATIENT
Start: 2022-10-17 | End: 2022-10-17 | Stop reason: HOSPADM

## 2022-10-17 RX ORDER — ONDANSETRON 2 MG/ML
4 INJECTION INTRAMUSCULAR; INTRAVENOUS EVERY 4 HOURS PRN
Status: DISCONTINUED | OUTPATIENT
Start: 2022-10-17 | End: 2022-10-18

## 2022-10-17 RX ORDER — DEXAMETHASONE SODIUM PHOSPHATE 4 MG/ML
4 INJECTION, SOLUTION INTRA-ARTICULAR; INTRALESIONAL; INTRAMUSCULAR; INTRAVENOUS; SOFT TISSUE
Status: DISCONTINUED | OUTPATIENT
Start: 2022-10-17 | End: 2022-10-26

## 2022-10-17 RX ORDER — HYDROMORPHONE HYDROCHLORIDE 1 MG/ML
0.1 INJECTION, SOLUTION INTRAMUSCULAR; INTRAVENOUS; SUBCUTANEOUS
Status: DISCONTINUED | OUTPATIENT
Start: 2022-10-17 | End: 2022-10-17 | Stop reason: HOSPADM

## 2022-10-17 RX ORDER — HYDROMORPHONE HYDROCHLORIDE 2 MG/ML
INJECTION, SOLUTION INTRAMUSCULAR; INTRAVENOUS; SUBCUTANEOUS PRN
Status: DISCONTINUED | OUTPATIENT
Start: 2022-10-17 | End: 2022-10-17 | Stop reason: SURG

## 2022-10-17 RX ORDER — SCOLOPAMINE TRANSDERMAL SYSTEM 1 MG/1
1 PATCH, EXTENDED RELEASE TRANSDERMAL
Status: DISCONTINUED | OUTPATIENT
Start: 2022-10-17 | End: 2022-10-26

## 2022-10-17 RX ORDER — BISACODYL 10 MG
10 SUPPOSITORY, RECTAL RECTAL
Status: DISCONTINUED | OUTPATIENT
Start: 2022-10-17 | End: 2022-10-18

## 2022-10-17 RX ORDER — ENEMA 19; 7 G/133ML; G/133ML
1 ENEMA RECTAL
Status: DISCONTINUED | OUTPATIENT
Start: 2022-10-17 | End: 2022-10-18

## 2022-10-17 RX ORDER — LIDOCAINE HYDROCHLORIDE 20 MG/ML
INJECTION, SOLUTION EPIDURAL; INFILTRATION; INTRACAUDAL; PERINEURAL PRN
Status: DISCONTINUED | OUTPATIENT
Start: 2022-10-17 | End: 2022-10-17 | Stop reason: SURG

## 2022-10-17 RX ORDER — POTASSIUM CHLORIDE 20 MEQ/1
20 TABLET, EXTENDED RELEASE ORAL DAILY
Status: DISCONTINUED | OUTPATIENT
Start: 2022-10-17 | End: 2022-10-18

## 2022-10-17 RX ORDER — SODIUM CHLORIDE, SODIUM LACTATE, POTASSIUM CHLORIDE, CALCIUM CHLORIDE 600; 310; 30; 20 MG/100ML; MG/100ML; MG/100ML; MG/100ML
INJECTION, SOLUTION INTRAVENOUS
Status: DISCONTINUED | OUTPATIENT
Start: 2022-10-17 | End: 2022-10-17 | Stop reason: SURG

## 2022-10-17 RX ORDER — SODIUM CHLORIDE, SODIUM GLUCONATE, SODIUM ACETATE, POTASSIUM CHLORIDE AND MAGNESIUM CHLORIDE 526; 502; 368; 37; 30 MG/100ML; MG/100ML; MG/100ML; MG/100ML; MG/100ML
INJECTION, SOLUTION INTRAVENOUS
Status: DISCONTINUED | OUTPATIENT
Start: 2022-10-17 | End: 2022-10-17 | Stop reason: SURG

## 2022-10-17 RX ORDER — MEPERIDINE HYDROCHLORIDE 25 MG/ML
12.5 INJECTION INTRAMUSCULAR; INTRAVENOUS; SUBCUTANEOUS
Status: DISCONTINUED | OUTPATIENT
Start: 2022-10-17 | End: 2022-10-17 | Stop reason: HOSPADM

## 2022-10-17 RX ORDER — HYDROMORPHONE HYDROCHLORIDE 1 MG/ML
0.4 INJECTION, SOLUTION INTRAMUSCULAR; INTRAVENOUS; SUBCUTANEOUS
Status: DISCONTINUED | OUTPATIENT
Start: 2022-10-17 | End: 2022-10-17 | Stop reason: HOSPADM

## 2022-10-17 RX ORDER — LABETALOL HYDROCHLORIDE 5 MG/ML
INJECTION, SOLUTION INTRAVENOUS
Status: COMPLETED
Start: 2022-10-17 | End: 2022-10-17

## 2022-10-17 RX ORDER — OXYCODONE HCL 5 MG/5 ML
10 SOLUTION, ORAL ORAL
Status: COMPLETED | OUTPATIENT
Start: 2022-10-17 | End: 2022-10-17

## 2022-10-17 RX ADMIN — HYDROMORPHONE HYDROCHLORIDE 0.4 MG: 1 INJECTION, SOLUTION INTRAMUSCULAR; INTRAVENOUS; SUBCUTANEOUS at 21:34

## 2022-10-17 RX ADMIN — HYDRALAZINE HYDROCHLORIDE 5 MG: 20 INJECTION INTRAMUSCULAR; INTRAVENOUS at 21:40

## 2022-10-17 RX ADMIN — ACETAMINOPHEN 650 MG: 325 TABLET, FILM COATED ORAL at 23:01

## 2022-10-17 RX ADMIN — DEXAMETHASONE SODIUM PHOSPHATE 8 MG: 4 INJECTION, SOLUTION INTRA-ARTICULAR; INTRALESIONAL; INTRAMUSCULAR; INTRAVENOUS; SOFT TISSUE at 16:40

## 2022-10-17 RX ADMIN — Medication 1 EACH: at 04:14

## 2022-10-17 RX ADMIN — POTASSIUM CHLORIDE 20 MEQ: 1500 TABLET, EXTENDED RELEASE ORAL at 08:10

## 2022-10-17 RX ADMIN — GABAPENTIN 100 MG: 100 CAPSULE ORAL at 14:03

## 2022-10-17 RX ADMIN — SENNOSIDES AND DOCUSATE SODIUM 1 TABLET: 50; 8.6 TABLET ORAL at 23:02

## 2022-10-17 RX ADMIN — METAXALONE 400 MG: 800 TABLET ORAL at 04:14

## 2022-10-17 RX ADMIN — PROPOFOL 200 MG: 10 INJECTION, EMULSION INTRAVENOUS at 16:40

## 2022-10-17 RX ADMIN — ENOXAPARIN SODIUM 30 MG: 30 INJECTION SUBCUTANEOUS at 04:13

## 2022-10-17 RX ADMIN — FENTANYL CITRATE 75 MCG: 50 INJECTION, SOLUTION INTRAMUSCULAR; INTRAVENOUS at 16:50

## 2022-10-17 RX ADMIN — ACETAMINOPHEN 650 MG: 325 TABLET, FILM COATED ORAL at 04:14

## 2022-10-17 RX ADMIN — SODIUM CHLORIDE, POTASSIUM CHLORIDE, SODIUM LACTATE AND CALCIUM CHLORIDE: 600; 310; 30; 20 INJECTION, SOLUTION INTRAVENOUS at 05:44

## 2022-10-17 RX ADMIN — OXYCODONE 5 MG: 5 TABLET ORAL at 10:23

## 2022-10-17 RX ADMIN — OXYCODONE 5 MG: 5 TABLET ORAL at 04:14

## 2022-10-17 RX ADMIN — HYDRALAZINE HYDROCHLORIDE 5 MG: 20 INJECTION INTRAMUSCULAR; INTRAVENOUS at 21:56

## 2022-10-17 RX ADMIN — LIDOCAINE HYDROCHLORIDE 80 MG: 20 INJECTION, SOLUTION EPIDURAL; INFILTRATION; INTRACAUDAL at 16:40

## 2022-10-17 RX ADMIN — FENTANYL CITRATE 100 MCG: 50 INJECTION, SOLUTION INTRAMUSCULAR; INTRAVENOUS at 16:40

## 2022-10-17 RX ADMIN — FENTANYL CITRATE 75 MCG: 50 INJECTION, SOLUTION INTRAMUSCULAR; INTRAVENOUS at 17:04

## 2022-10-17 RX ADMIN — FENTANYL CITRATE 50 MCG: 50 INJECTION, SOLUTION INTRAMUSCULAR; INTRAVENOUS at 18:47

## 2022-10-17 RX ADMIN — ACETAMINOPHEN 650 MG: 325 TABLET, FILM COATED ORAL at 11:37

## 2022-10-17 RX ADMIN — SODIUM CHLORIDE, POTASSIUM CHLORIDE, SODIUM LACTATE AND CALCIUM CHLORIDE: 600; 310; 30; 20 INJECTION, SOLUTION INTRAVENOUS at 22:51

## 2022-10-17 RX ADMIN — CELECOXIB 200 MG: 200 CAPSULE ORAL at 23:01

## 2022-10-17 RX ADMIN — TAMSULOSIN HYDROCHLORIDE 0.4 MG: 0.4 CAPSULE ORAL at 08:10

## 2022-10-17 RX ADMIN — SODIUM CHLORIDE, SODIUM GLUCONATE, SODIUM ACETATE, POTASSIUM CHLORIDE AND MAGNESIUM CHLORIDE: 526; 502; 368; 37; 30 INJECTION, SOLUTION INTRAVENOUS at 18:44

## 2022-10-17 RX ADMIN — METAXALONE 400 MG: 800 TABLET ORAL at 11:36

## 2022-10-17 RX ADMIN — ONDANSETRON 4 MG: 2 INJECTION INTRAMUSCULAR; INTRAVENOUS at 20:13

## 2022-10-17 RX ADMIN — OXYCODONE HYDROCHLORIDE 10 MG: 5 SOLUTION ORAL at 21:17

## 2022-10-17 RX ADMIN — SODIUM CHLORIDE, POTASSIUM CHLORIDE, SODIUM LACTATE AND CALCIUM CHLORIDE: 600; 310; 30; 20 INJECTION, SOLUTION INTRAVENOUS at 16:32

## 2022-10-17 RX ADMIN — FENTANYL CITRATE 50 MCG: 50 INJECTION, SOLUTION INTRAMUSCULAR; INTRAVENOUS at 18:05

## 2022-10-17 RX ADMIN — Medication 1 EACH: at 11:37

## 2022-10-17 RX ADMIN — LABETALOL HYDROCHLORIDE 5 MG: 5 INJECTION, SOLUTION INTRAVENOUS at 21:05

## 2022-10-17 RX ADMIN — HYDROMORPHONE HYDROCHLORIDE 0.2 MG: 1 INJECTION, SOLUTION INTRAMUSCULAR; INTRAVENOUS; SUBCUTANEOUS at 21:58

## 2022-10-17 RX ADMIN — HYDROMORPHONE HYDROCHLORIDE 1 MG: 2 INJECTION INTRAMUSCULAR; INTRAVENOUS; SUBCUTANEOUS at 18:56

## 2022-10-17 RX ADMIN — HYDROMORPHONE HYDROCHLORIDE 0.4 MG: 1 INJECTION, SOLUTION INTRAMUSCULAR; INTRAVENOUS; SUBCUTANEOUS at 21:20

## 2022-10-17 RX ADMIN — HYDRALAZINE HYDROCHLORIDE 5 MG: 20 INJECTION INTRAMUSCULAR; INTRAVENOUS at 21:51

## 2022-10-17 RX ADMIN — LABETALOL HYDROCHLORIDE 5 MG: 5 INJECTION, SOLUTION INTRAVENOUS at 21:26

## 2022-10-17 RX ADMIN — HYDROMORPHONE HYDROCHLORIDE 1 MG: 2 INJECTION INTRAMUSCULAR; INTRAVENOUS; SUBCUTANEOUS at 18:48

## 2022-10-17 RX ADMIN — EPHEDRINE SULFATE 10 MG: 50 INJECTION, SOLUTION INTRAVENOUS at 16:48

## 2022-10-17 RX ADMIN — GABAPENTIN 100 MG: 100 CAPSULE ORAL at 04:14

## 2022-10-17 RX ADMIN — HYDRALAZINE HYDROCHLORIDE 5 MG: 20 INJECTION INTRAMUSCULAR; INTRAVENOUS at 21:31

## 2022-10-17 RX ADMIN — DOCUSATE SODIUM 100 MG: 100 CAPSULE, LIQUID FILLED ORAL at 23:01

## 2022-10-17 RX ADMIN — CEFAZOLIN 2 G: 330 INJECTION, POWDER, FOR SOLUTION INTRAMUSCULAR; INTRAVENOUS at 16:40

## 2022-10-17 RX ADMIN — LABETALOL HYDROCHLORIDE 5 MG: 5 INJECTION INTRAVENOUS at 21:05

## 2022-10-17 ASSESSMENT — ENCOUNTER SYMPTOMS
CHILLS: 0
ABDOMINAL PAIN: 0
HEADACHES: 0
NAUSEA: 0
TINGLING: 0
SHORTNESS OF BREATH: 0
FEVER: 0
SENSORY CHANGE: 0
DOUBLE VISION: 0
SPEECH CHANGE: 0
TREMORS: 0
COUGH: 0
BACK PAIN: 0
MYALGIAS: 1
DIZZINESS: 0
VOMITING: 0
NECK PAIN: 1

## 2022-10-17 ASSESSMENT — PAIN DESCRIPTION - PAIN TYPE
TYPE: ACUTE PAIN
TYPE: SURGICAL PAIN
TYPE: ACUTE PAIN
TYPE: SURGICAL PAIN

## 2022-10-17 NOTE — PROGRESS NOTES
Trauma / Surgical Daily Progress Note    Date of Service  10/17/2022    Chief Complaint  62 y.o. male admitted 10/13/2022 with Trauma, hiking and fall, spinal fracture, facial fxs, bilateral wrists fxs    Interval Events  No acute overnight events noted  Operative fixation of bilateral wrists pending today  Tentative facial fixation 10/18  Therapy evaluations pending     Review of Systems  Review of Systems   Constitutional:  Negative for chills and fever.   Eyes:  Negative for double vision.   Respiratory:  Negative for cough and shortness of breath.    Cardiovascular:  Negative for chest pain.   Gastrointestinal:  Negative for abdominal pain, nausea and vomiting.   Musculoskeletal:  Positive for joint pain, myalgias and neck pain. Negative for back pain.   Neurological:  Negative for dizziness, tingling, tremors, sensory change, speech change and headaches.      Vital Signs  Temp:  [36.3 °C (97.3 °F)-36.5 °C (97.7 °F)] 36.5 °C (97.7 °F)  Pulse:  [78-87] 78  Resp:  [17-18] 17  BP: (154-178)/(76-92) 154/78  SpO2:  [94 %-95 %] 95 %    Physical Exam  Physical Exam  Vitals and nursing note reviewed.   Constitutional:       General: He is awake. He is not in acute distress.     Appearance: He is not toxic-appearing.      Interventions: Cervical collar and nasal cannula in place.   HENT:      Head: Normocephalic.      Comments: Significant facial trauma.  Lip laceration approximated.     Mouth/Throat:      Mouth: Mucous membranes are moist.      Pharynx: Oropharynx is clear.   Eyes:      Conjunctiva/sclera: Conjunctivae normal.      Comments: Bilateral periorbital ecchymosis.    Cardiovascular:      Rate and Rhythm: Normal rate and regular rhythm.      Pulses: Normal pulses.   Pulmonary:      Effort: Pulmonary effort is normal. No respiratory distress.   Chest:      Chest wall: No tenderness.   Abdominal:      General: There is no distension.      Palpations: Abdomen is soft.      Tenderness: There is no abdominal  tenderness. There is no guarding.   Genitourinary:     Comments: Barajas in place with yellow urine   Musculoskeletal:         General: Tenderness and signs of injury present.      Comments: Splint to bilateral upper extremities, wiggles fingers bilaterally   Skin:     General: Skin is warm and dry.      Capillary Refill: Capillary refill takes less than 2 seconds.      Comments: Scattered abrasions     Neurological:      Mental Status: He is alert and oriented to person, place, and time.   Psychiatric:         Behavior: Behavior normal. Behavior is cooperative.       Laboratory  Recent Results (from the past 24 hour(s))   CBC with Differential: Tomorrow AM    Collection Time: 10/16/22  4:39 PM   Result Value Ref Range    WBC 13.3 (H) 4.8 - 10.8 K/uL    RBC 4.19 (L) 4.70 - 6.10 M/uL    Hemoglobin 12.4 (L) 14.0 - 18.0 g/dL    Hematocrit 36.6 (L) 42.0 - 52.0 %    MCV 87.4 81.4 - 97.8 fL    MCH 29.6 27.0 - 33.0 pg    MCHC 33.9 33.7 - 35.3 g/dL    RDW 40.0 35.9 - 50.0 fL    Platelet Count 241 164 - 446 K/uL    MPV 10.2 9.0 - 12.9 fL    Neutrophils-Polys 77.10 (H) 44.00 - 72.00 %    Lymphocytes 13.80 (L) 22.00 - 41.00 %    Monocytes 7.80 0.00 - 13.40 %    Eosinophils 0.50 0.00 - 6.90 %    Basophils 0.30 0.00 - 1.80 %    Immature Granulocytes 0.50 0.00 - 0.90 %    Nucleated RBC 0.00 /100 WBC    Neutrophils (Absolute) 10.23 (H) 1.82 - 7.42 K/uL    Lymphs (Absolute) 1.83 1.00 - 4.80 K/uL    Monos (Absolute) 1.04 (H) 0.00 - 0.85 K/uL    Eos (Absolute) 0.06 0.00 - 0.51 K/uL    Baso (Absolute) 0.04 0.00 - 0.12 K/uL    Immature Granulocytes (abs) 0.07 0.00 - 0.11 K/uL    NRBC (Absolute) 0.00 K/uL   Basic Metabolic Panel (BMP): Tomorrow AM    Collection Time: 10/16/22  4:39 PM   Result Value Ref Range    Sodium 136 135 - 145 mmol/L    Potassium 3.5 (L) 3.6 - 5.5 mmol/L    Chloride 101 96 - 112 mmol/L    Co2 26 20 - 33 mmol/L    Glucose 99 65 - 99 mg/dL    Bun 12 8 - 22 mg/dL    Creatinine 0.45 (L) 0.50 - 1.40 mg/dL    Calcium 8.7  8.5 - 10.5 mg/dL    Anion Gap 9.0 7.0 - 16.0   ESTIMATED GFR    Collection Time: 10/16/22  4:39 PM   Result Value Ref Range    GFR (CKD-EPI) 118 >60 mL/min/1.73 m 2   CBC with Differential: Tomorrow AM    Collection Time: 10/17/22 12:11 AM   Result Value Ref Range    WBC 11.1 (H) 4.8 - 10.8 K/uL    RBC 4.01 (L) 4.70 - 6.10 M/uL    Hemoglobin 12.1 (L) 14.0 - 18.0 g/dL    Hematocrit 35.3 (L) 42.0 - 52.0 %    MCV 88.0 81.4 - 97.8 fL    MCH 30.2 27.0 - 33.0 pg    MCHC 34.3 33.7 - 35.3 g/dL    RDW 39.8 35.9 - 50.0 fL    Platelet Count 225 164 - 446 K/uL    MPV 10.4 9.0 - 12.9 fL    Neutrophils-Polys 71.00 44.00 - 72.00 %    Lymphocytes 18.30 (L) 22.00 - 41.00 %    Monocytes 9.00 0.00 - 13.40 %    Eosinophils 0.90 0.00 - 6.90 %    Basophils 0.50 0.00 - 1.80 %    Immature Granulocytes 0.30 0.00 - 0.90 %    Nucleated RBC 0.00 /100 WBC    Neutrophils (Absolute) 7.88 (H) 1.82 - 7.42 K/uL    Lymphs (Absolute) 2.03 1.00 - 4.80 K/uL    Monos (Absolute) 1.00 (H) 0.00 - 0.85 K/uL    Eos (Absolute) 0.10 0.00 - 0.51 K/uL    Baso (Absolute) 0.05 0.00 - 0.12 K/uL    Immature Granulocytes (abs) 0.03 0.00 - 0.11 K/uL    NRBC (Absolute) 0.00 K/uL   Basic Metabolic Panel (BMP): Tomorrow AM    Collection Time: 10/17/22 12:11 AM   Result Value Ref Range    Sodium 140 135 - 145 mmol/L    Potassium 3.5 (L) 3.6 - 5.5 mmol/L    Chloride 104 96 - 112 mmol/L    Co2 26 20 - 33 mmol/L    Glucose 107 (H) 65 - 99 mg/dL    Bun 11 8 - 22 mg/dL    Creatinine 0.50 0.50 - 1.40 mg/dL    Calcium 8.3 (L) 8.5 - 10.5 mg/dL    Anion Gap 10.0 7.0 - 16.0   Magnesium: Every Monday and Thursday AM    Collection Time: 10/17/22 12:11 AM   Result Value Ref Range    Magnesium 1.8 1.5 - 2.5 mg/dL   Phosphorus: Every Monday and Thursday AM    Collection Time: 10/17/22 12:11 AM   Result Value Ref Range    Phosphorus 3.0 2.5 - 4.5 mg/dL   ESTIMATED GFR    Collection Time: 10/17/22 12:11 AM   Result Value Ref Range    GFR (CKD-EPI) 114 >60 mL/min/1.73 m 2        Fluids    Intake/Output Summary (Last 24 hours) at 10/17/2022 0834  Last data filed at 10/17/2022 0400  Gross per 24 hour   Intake 1900 ml   Output 2500 ml   Net -600 ml       Core Measures & Quality Metrics  Labs reviewed, Medications reviewed and Radiology images reviewed  Barajas catheter: Urinary Tract Retention or Urinary Tract Obstruction      DVT Prophylaxis: Enoxaparin (Lovenox)  DVT prophylaxis - mechanical: SCDs  Ulcer prophylaxis: Not indicated      RAP Score Total: 5  CAGE Results: negative Blood Alcohol>0.08: no     Assessment/Plan  Multiple facial fractures, closed, initial encounter ([facility])- (present on admission)  Assessment & Plan  Fractures of the anterior, medial and superior maxillary sinuses bilaterally, fracture of the right lateral maxillary sinus, bilateral nasal fractures.  Comminuted frontal sinus fracture which appears to involve superior orbital walls bilaterally.    Comminuted displaced fracture of the right superior medial orbit, no retro-orbital hematoma.  Bilateral pterygoid plate fractures.  LeForte III type fracture  Keep HOB elevated  Definitive operative reduction and stabilization pending. Tentative 10/18  Huey Ahuja MD. Plastic Surgeon. [facility].      Urinary retention- (present on admission)  Assessment & Plan  10/14 Barajas placed for retention  10/15 Start Flomax     Traumatic closed displaced fracture of distal end of radius and ulna, left, initial encounter- (present on admission)  Assessment & Plan  Comminuted intra-articular distal radius fracture on left.    Significant displacement of large radial sided fracture fragment.  Comminuted fracture of distal ulna.  Splinted at referring facility.  Reduced in ED with conscious sedation  CT bilateral wrists ordered  Definitive operative reduction and stabilization pending.  Weight bearing status - Nonweightbearing RHIANNON Ocampo MD. Orthopedic Surgeon. [facility].      Closed fracture  distal radius and ulna, right, initial encounter- (present on admission)  Assessment & Plan  Comminuted dorsally angulated intra-articular distal radius fracture with impaction.    Mildly distracted fracture of the base of the ulnar styloid.  Splinted at referring facility  Reduced in ED with conscious sedation  CT bilateral wrists ordered  Definitive operative reduction and stabilization pending.  Weight bearing status - Nonweightbearing RUEMERALD.  Deyvi Ocampo MD. Orthopedic Surgeon. [facility].      Closed fracture of third cervical vertebra, initial encounter ([facility])- (present on admission)  Assessment & Plan  Fracture of the anterior inferior corner of the C3 vertebral body with slight anterior displacement approximately 3 mm.  Equivocal nondisplaced fracture line extending to superior endplate of the vertebral body.  Left C3-4 facet mildly widened with posterior displacement of C3 facet in relation to C4  CTA neck within normal limits  Non-operative management.   Cervical immobilization.   Recommend rigid cervical orthosis. Follow up in clinic 6 weeks with AP / lateral cervical spine xrays  Garrett Edgar MD, PhD. Neurosurgeon. [facility].        Lip laceration- (present on admission)  Assessment & Plan  Repaired in ED with absorbable suture     No contraindication to deep vein thrombosis (DVT) prophylaxis- (present on admission)  Assessment & Plan  Prophylactic anticoagulation for thrombotic prevention initially contraindicated secondary to elevated bleeding risk.  10/14 Prophylactic dose enoxaparin initiated.       Finger dislocation, initial encounter- (present on admission)  Assessment & Plan  Dorsal dislocation of the PIP joint of the long finger without associated fracture.  Reduced at referring facility.     Trauma- (present on admission)  Assessment & Plan  Fall while hiking  Trauma Green Transfer Activation.  Marck Marquez MD. Trauma Surgery.         Discussed patient  condition with RN, Patient, and trauma surgery, Dr. STEVE Marquez.

## 2022-10-17 NOTE — PROGRESS NOTES
Report received from Maryam SANDOVAL, assumed care at 1900  A0x4  Pt declines any SOB on 1L NC at night, chest pain, new onset of numbness/tingling  Pt rates pain at 2/10, on a scale of 1-10, pt medicated per STEVEN Barajas in place with adequate output  Pt has + flatus, + bowel sounds, BM 10/16  Pt NWB to bilateral upper extremities with splints in place, C Collar in place.   Pt is tolerating a full liquid diet, pt denies any nausea/vomiting. Pt to be NPO 10/17 after breakfast  Plan of care discussed, all questions answered.Call light is within reach, treaded slipper socks on, bed in lowest/locked position, hourly rounding in place, all needs met at this time.

## 2022-10-17 NOTE — CARE PLAN
Problem: Knowledge Deficit - Standard  Goal: Patient and family/care givers will demonstrate understanding of plan of care, disease process/condition, diagnostic tests and medications  Outcome: Progressing     Problem: Pain - Standard  Goal: Alleviation of pain or a reduction in pain to the patient’s comfort goal  Outcome: Progressing   The patient is Stable - Low risk of patient condition declining or worsening    Shift Goals  Clinical Goals: pain control, wean o2  Patient Goals: rest  Family Goals: N/A    Progress made toward(s) clinical / shift goals:  pts pain managed with prn pain medication.  POC discussed with pt, pt verbalized understanding of plan.

## 2022-10-17 NOTE — THERAPY
10/17/22 0809   Interdisciplinary Plan of Care Collaboration   Collaboration Comments OT consult received. Pt pending bilateral wrist sx today. Will follow up post op as appropriate.

## 2022-10-17 NOTE — THERAPY
Missed Therapy     Patient Name: Leobardo Young  Age:  62 y.o., Sex:  male  Medical Record #: [id number]  Today's Date: 10/17/2022    Discussed missed therapy with    10/17/22 0728   Initial Contact Note    Initial Contact Note Order Received and Verified, Physical Therapy Evaluation in Progress with Full Report to Follow.   Interdisciplinary Plan of Care Collaboration   IDT Collaboration with  Nursing   Collaboration Comments PT maria alejandraal deferred this date. Pt is pending surgery tonight for B/L wrists per ortho surgeon notes.  Spoke with RN who confirmed this will f/u.   Session Information   Date / Session Number  10/17- (EVAL) defer       Cristal Pride, PT,DPT

## 2022-10-17 NOTE — CARE PLAN
The patient is Stable - Low risk of patient condition declining or worsening    Shift Goals  Clinical Goals: pain control, mobility  Patient Goals: mobility  Family Goals: N/A    Progress made toward(s) clinical / shift goals:  Pain frequently assessed. Medicated Per MAR. Pt verbalizes understanding of plan of care. Reinforced use of call light to prevent falls.     Problem: Knowledge Deficit - Standard  Goal: Patient and family/care givers will demonstrate understanding of plan of care, disease process/condition, diagnostic tests and medications  Outcome: Progressing     Problem: Pain - Standard  Goal: Alleviation of pain or a reduction in pain to the patient’s comfort goal  Outcome: Progressing     Problem: Fall Risk  Goal: Patient will remain free from falls  Outcome: Progressing       Patient is not progressing towards the following goals:

## 2022-10-17 NOTE — PROGRESS NOTES
Received report from previous shift RN  Assessment complete.  A&O x 4. Patient calls appropriately.  Patient ambulates with standby assist.NWB to BUE.  Patient has 5/10 pain. Pain managed with prescribed medications.  Denies N&V. NPO at this time.  BUE splinted.   + void via tatum, + flatus, last BM 10/116.  Patient denies SOB.  SCD's on.  Patient pleasant and cooperative with plan of care.  Review plan with of care with patient. Call light and personal belongings with in reach. Hourly rounding in place. All needs met at this time.

## 2022-10-17 NOTE — ANESTHESIA PREPROCEDURE EVALUATION
Case: [id number] Date/Time: 10/17/22 1610    Procedure: OPEN REDUCTION INTERNAL FIXATION BILATERAL WRIST    Location: [facility] OR 16 / SURGERY [facility]    Surgeons: Te Comer M.D.          Relevant Problems   Other   (positive) Closed fracture distal radius and ulna, right, initial encounter   (positive) Closed fracture of third cervical vertebra, initial encounter ([facility])   (positive) Multiple facial fractures, closed, initial encounter ([facility])   (positive) Trauma   (positive) Traumatic closed displaced fracture of distal end of radius and ulna, left, initial encounter       Physical Exam    Airway   Mallampati: III  TM distance: >3 FB  Neck ROM: limited       Cardiovascular - normal exam  Rhythm: regular  Rate: normal  (-) murmur     Dental - normal exam           Pulmonary - normal exam  Breath sounds clear to auscultation     Abdominal    Neurological - normal exam                 Anesthesia Plan    ASA 2       Plan - general       Airway plan will be LMA          Induction: intravenous    Postoperative Plan: Postoperative administration of opioids is intended.    Pertinent diagnostic labs and testing reviewed    Informed Consent:    Anesthetic plan and risks discussed with patient.    Use of blood products discussed with: patient whom consented to blood products.

## 2022-10-18 ENCOUNTER — ANESTHESIA EVENT (OUTPATIENT)
Dept: SURGERY | Facility: MEDICAL CENTER | Age: 62
DRG: 141 | End: 2022-10-18
Payer: COMMERCIAL

## 2022-10-18 ENCOUNTER — ANESTHESIA (OUTPATIENT)
Dept: SURGERY | Facility: MEDICAL CENTER | Age: 62
DRG: 141 | End: 2022-10-18
Payer: COMMERCIAL

## 2022-10-18 LAB
ANION GAP SERPL CALC-SCNC: 11 MMOL/L (ref 7–16)
BASOPHILS # BLD AUTO: 0.1 % (ref 0–1.8)
BASOPHILS # BLD: 0.02 K/UL (ref 0–0.12)
BUN SERPL-MCNC: 12 MG/DL (ref 8–22)
CALCIUM SERPL-MCNC: 7.8 MG/DL (ref 8.5–10.5)
CHLORIDE SERPL-SCNC: 99 MMOL/L (ref 96–112)
CO2 SERPL-SCNC: 25 MMOL/L (ref 20–33)
CREAT SERPL-MCNC: 0.52 MG/DL (ref 0.5–1.4)
EOSINOPHIL # BLD AUTO: 0.01 K/UL (ref 0–0.51)
EOSINOPHIL NFR BLD: 0.1 % (ref 0–6.9)
ERYTHROCYTE [DISTWIDTH] IN BLOOD BY AUTOMATED COUNT: 40.3 FL (ref 35.9–50)
GFR SERPLBLD CREATININE-BSD FMLA CKD-EPI: 113 ML/MIN/1.73 M 2
GLUCOSE SERPL-MCNC: 118 MG/DL (ref 65–99)
HCT VFR BLD AUTO: 37.2 % (ref 42–52)
HGB BLD-MCNC: 12.3 G/DL (ref 14–18)
IMM GRANULOCYTES # BLD AUTO: 0.06 K/UL (ref 0–0.11)
IMM GRANULOCYTES NFR BLD AUTO: 0.4 % (ref 0–0.9)
LYMPHOCYTES # BLD AUTO: 1.61 K/UL (ref 1–4.8)
LYMPHOCYTES NFR BLD: 11.6 % (ref 22–41)
MCH RBC QN AUTO: 29.8 PG (ref 27–33)
MCHC RBC AUTO-ENTMCNC: 33.1 G/DL (ref 33.7–35.3)
MCV RBC AUTO: 90.1 FL (ref 81.4–97.8)
MONOCYTES # BLD AUTO: 0.85 K/UL (ref 0–0.85)
MONOCYTES NFR BLD AUTO: 6.1 % (ref 0–13.4)
NEUTROPHILS # BLD AUTO: 11.37 K/UL (ref 1.82–7.42)
NEUTROPHILS NFR BLD: 81.7 % (ref 44–72)
NRBC # BLD AUTO: 0 K/UL
NRBC BLD-RTO: 0 /100 WBC
PLATELET # BLD AUTO: 274 K/UL (ref 164–446)
PMV BLD AUTO: 9.9 FL (ref 9–12.9)
POTASSIUM SERPL-SCNC: 3.8 MMOL/L (ref 3.6–5.5)
RBC # BLD AUTO: 4.13 M/UL (ref 4.7–6.1)
SODIUM SERPL-SCNC: 135 MMOL/L (ref 135–145)
WBC # BLD AUTO: 13.9 K/UL (ref 4.8–10.8)

## 2022-10-18 PROCEDURE — A9270 NON-COVERED ITEM OR SERVICE: HCPCS | Performed by: NURSE PRACTITIONER

## 2022-10-18 PROCEDURE — 36415 COLL VENOUS BLD VENIPUNCTURE: CPT

## 2022-10-18 PROCEDURE — 700102 HCHG RX REV CODE 250 W/ 637 OVERRIDE(OP): Performed by: PLASTIC SURGERY

## 2022-10-18 PROCEDURE — 0NSV04Z REPOSITION LEFT MANDIBLE WITH INTERNAL FIXATION DEVICE, OPEN APPROACH: ICD-10-PCS | Performed by: PLASTIC SURGERY

## 2022-10-18 PROCEDURE — 700111 HCHG RX REV CODE 636 W/ 250 OVERRIDE (IP): Performed by: SPECIALIST

## 2022-10-18 PROCEDURE — 97163 PT EVAL HIGH COMPLEX 45 MIN: CPT

## 2022-10-18 PROCEDURE — 700101 HCHG RX REV CODE 250: Performed by: ANESTHESIOLOGY

## 2022-10-18 PROCEDURE — 700102 HCHG RX REV CODE 250 W/ 637 OVERRIDE(OP): Performed by: SPECIALIST

## 2022-10-18 PROCEDURE — A9270 NON-COVERED ITEM OR SERVICE: HCPCS | Performed by: SPECIALIST

## 2022-10-18 PROCEDURE — 0NSN04Z REPOSITION LEFT ZYGOMATIC BONE WITH INTERNAL FIXATION DEVICE, OPEN APPROACH: ICD-10-PCS | Performed by: PLASTIC SURGERY

## 2022-10-18 PROCEDURE — 700101 HCHG RX REV CODE 250: Performed by: SPECIALIST

## 2022-10-18 PROCEDURE — 160048 HCHG OR STATISTICAL LEVEL 1-5: Performed by: PLASTIC SURGERY

## 2022-10-18 PROCEDURE — 0NST04Z REPOSITION RIGHT MANDIBLE WITH INTERNAL FIXATION DEVICE, OPEN APPROACH: ICD-10-PCS | Performed by: PLASTIC SURGERY

## 2022-10-18 PROCEDURE — 00192 ANES PX FCL B1/SKL RAD SURG: CPT | Performed by: ANESTHESIOLOGY

## 2022-10-18 PROCEDURE — A9270 NON-COVERED ITEM OR SERVICE: HCPCS | Performed by: PLASTIC SURGERY

## 2022-10-18 PROCEDURE — C1713 ANCHOR/SCREW BN/BN,TIS/BN: HCPCS | Performed by: PLASTIC SURGERY

## 2022-10-18 PROCEDURE — 700111 HCHG RX REV CODE 636 W/ 250 OVERRIDE (IP): Performed by: ANESTHESIOLOGY

## 2022-10-18 PROCEDURE — 160029 HCHG SURGERY MINUTES - 1ST 30 MINS LEVEL 4: Performed by: PLASTIC SURGERY

## 2022-10-18 PROCEDURE — 0NSM04Z REPOSITION RIGHT ZYGOMATIC BONE WITH INTERNAL FIXATION DEVICE, OPEN APPROACH: ICD-10-PCS | Performed by: PLASTIC SURGERY

## 2022-10-18 PROCEDURE — 80048 BASIC METABOLIC PNL TOTAL CA: CPT

## 2022-10-18 PROCEDURE — 700102 HCHG RX REV CODE 250 W/ 637 OVERRIDE(OP): Performed by: ORTHOPAEDIC SURGERY

## 2022-10-18 PROCEDURE — 160041 HCHG SURGERY MINUTES - EA ADDL 1 MIN LEVEL 4: Performed by: PLASTIC SURGERY

## 2022-10-18 PROCEDURE — 99232 SBSQ HOSP IP/OBS MODERATE 35: CPT | Performed by: NURSE PRACTITIONER

## 2022-10-18 PROCEDURE — 97535 SELF CARE MNGMENT TRAINING: CPT

## 2022-10-18 PROCEDURE — 700105 HCHG RX REV CODE 258: Performed by: SPECIALIST

## 2022-10-18 PROCEDURE — 160035 HCHG PACU - 1ST 60 MINS PHASE I: Performed by: PLASTIC SURGERY

## 2022-10-18 PROCEDURE — 85025 COMPLETE CBC W/AUTO DIFF WBC: CPT

## 2022-10-18 PROCEDURE — 700102 HCHG RX REV CODE 250 W/ 637 OVERRIDE(OP): Performed by: SURGERY

## 2022-10-18 PROCEDURE — 160009 HCHG ANES TIME/MIN: Performed by: PLASTIC SURGERY

## 2022-10-18 PROCEDURE — A9270 NON-COVERED ITEM OR SERVICE: HCPCS | Performed by: ORTHOPAEDIC SURGERY

## 2022-10-18 PROCEDURE — 160002 HCHG RECOVERY MINUTES (STAT): Performed by: PLASTIC SURGERY

## 2022-10-18 PROCEDURE — 700102 HCHG RX REV CODE 250 W/ 637 OVERRIDE(OP): Performed by: NURSE PRACTITIONER

## 2022-10-18 PROCEDURE — 770001 HCHG ROOM/CARE - MED/SURG/GYN PRIV*

## 2022-10-18 PROCEDURE — 700101 HCHG RX REV CODE 250: Performed by: PLASTIC SURGERY

## 2022-10-18 PROCEDURE — A9270 NON-COVERED ITEM OR SERVICE: HCPCS | Performed by: SURGERY

## 2022-10-18 PROCEDURE — 160036 HCHG PACU - EA ADDL 30 MINS PHASE I: Performed by: PLASTIC SURGERY

## 2022-10-18 DEVICE — IMPLANTABLE DEVICE: Type: IMPLANTABLE DEVICE | Site: FACE | Status: FUNCTIONAL

## 2022-10-18 DEVICE — SCREW SYN CF 5MM SD (5EA/PK) (2CFX40=80): Type: IMPLANTABLE DEVICE | Site: FACE | Status: FUNCTIONAL

## 2022-10-18 DEVICE — PLATE TI TALL MATRIXWAVE MMF 10 HOLE (2TX6=12): Type: IMPLANTABLE DEVICE | Site: FACE | Status: FUNCTIONAL

## 2022-10-18 DEVICE — PLATE SYN CF ORB RIM 12H .5MM - (2CFX3=6): Type: IMPLANTABLE DEVICE | Site: FACE | Status: FUNCTIONAL

## 2022-10-18 DEVICE — PLATE SYN CF ADAP 20H 0.5MM - (2CFX2=4): Type: IMPLANTABLE DEVICE | Site: FACE | Status: FUNCTIONAL

## 2022-10-18 DEVICE — SCREW SYN CF 6MM SD (5EA/PK) (2CFX40=80): Type: IMPLANTABLE DEVICE | Site: FACE | Status: FUNCTIONAL

## 2022-10-18 DEVICE — SCREW TI SELF DRILLING MATRIX WAVE MMF 1.85MM (5EA/PK) (2TX60=120): Type: IMPLANTABLE DEVICE | Site: FACE | Status: FUNCTIONAL

## 2022-10-18 RX ORDER — HYDRALAZINE HYDROCHLORIDE 20 MG/ML
5 INJECTION INTRAMUSCULAR; INTRAVENOUS
Status: DISCONTINUED | OUTPATIENT
Start: 2022-10-18 | End: 2022-10-18 | Stop reason: HOSPADM

## 2022-10-18 RX ORDER — LIDOCAINE HYDROCHLORIDE 20 MG/ML
INJECTION, SOLUTION EPIDURAL; INFILTRATION; INTRACAUDAL; PERINEURAL PRN
Status: DISCONTINUED | OUTPATIENT
Start: 2022-10-18 | End: 2022-10-18 | Stop reason: SURG

## 2022-10-18 RX ORDER — POTASSIUM CHLORIDE 20 MEQ/1
20 TABLET, EXTENDED RELEASE ORAL 3 TIMES DAILY
Status: DISCONTINUED | OUTPATIENT
Start: 2022-10-18 | End: 2022-10-20

## 2022-10-18 RX ORDER — CEFAZOLIN SODIUM 1 G/3ML
INJECTION, POWDER, FOR SOLUTION INTRAMUSCULAR; INTRAVENOUS PRN
Status: DISCONTINUED | OUTPATIENT
Start: 2022-10-18 | End: 2022-10-20 | Stop reason: HOSPADM

## 2022-10-18 RX ORDER — DIPHENHYDRAMINE HYDROCHLORIDE 50 MG/ML
12.5 INJECTION INTRAMUSCULAR; INTRAVENOUS
Status: DISCONTINUED | OUTPATIENT
Start: 2022-10-18 | End: 2022-10-18 | Stop reason: HOSPADM

## 2022-10-18 RX ORDER — MEPERIDINE HYDROCHLORIDE 25 MG/ML
12.5 INJECTION INTRAMUSCULAR; INTRAVENOUS; SUBCUTANEOUS
Status: DISCONTINUED | OUTPATIENT
Start: 2022-10-18 | End: 2022-10-18 | Stop reason: HOSPADM

## 2022-10-18 RX ORDER — ONDANSETRON 2 MG/ML
INJECTION INTRAMUSCULAR; INTRAVENOUS PRN
Status: DISCONTINUED | OUTPATIENT
Start: 2022-10-18 | End: 2022-10-18 | Stop reason: SURG

## 2022-10-18 RX ORDER — SODIUM CHLORIDE, SODIUM LACTATE, POTASSIUM CHLORIDE, CALCIUM CHLORIDE 600; 310; 30; 20 MG/100ML; MG/100ML; MG/100ML; MG/100ML
INJECTION, SOLUTION INTRAVENOUS CONTINUOUS
Status: DISCONTINUED | OUTPATIENT
Start: 2022-10-18 | End: 2022-10-18 | Stop reason: HOSPADM

## 2022-10-18 RX ORDER — CHLORHEXIDINE GLUCONATE ORAL RINSE 1.2 MG/ML
15 SOLUTION DENTAL 2 TIMES DAILY
Status: DISCONTINUED | OUTPATIENT
Start: 2022-10-18 | End: 2022-10-30

## 2022-10-18 RX ORDER — OXYCODONE HCL 5 MG/5 ML
10 SOLUTION, ORAL ORAL
Status: DISCONTINUED | OUTPATIENT
Start: 2022-10-18 | End: 2022-10-18 | Stop reason: HOSPADM

## 2022-10-18 RX ORDER — HYDROMORPHONE HYDROCHLORIDE 1 MG/ML
0.2 INJECTION, SOLUTION INTRAMUSCULAR; INTRAVENOUS; SUBCUTANEOUS
Status: DISCONTINUED | OUTPATIENT
Start: 2022-10-18 | End: 2022-10-18 | Stop reason: HOSPADM

## 2022-10-18 RX ORDER — HYDROMORPHONE HYDROCHLORIDE 1 MG/ML
0.5 INJECTION, SOLUTION INTRAMUSCULAR; INTRAVENOUS; SUBCUTANEOUS
Status: DISCONTINUED | OUTPATIENT
Start: 2022-10-18 | End: 2022-10-18 | Stop reason: HOSPADM

## 2022-10-18 RX ORDER — LIDOCAINE HYDROCHLORIDE AND EPINEPHRINE 10; 10 MG/ML; UG/ML
INJECTION, SOLUTION INFILTRATION; PERINEURAL
Status: DISCONTINUED | OUTPATIENT
Start: 2022-10-18 | End: 2022-10-18 | Stop reason: HOSPADM

## 2022-10-18 RX ORDER — HALOPERIDOL 5 MG/ML
1 INJECTION INTRAMUSCULAR
Status: DISCONTINUED | OUTPATIENT
Start: 2022-10-18 | End: 2022-10-18 | Stop reason: HOSPADM

## 2022-10-18 RX ORDER — DEXAMETHASONE SODIUM PHOSPHATE 4 MG/ML
INJECTION, SOLUTION INTRA-ARTICULAR; INTRALESIONAL; INTRAMUSCULAR; INTRAVENOUS; SOFT TISSUE PRN
Status: DISCONTINUED | OUTPATIENT
Start: 2022-10-18 | End: 2022-10-18 | Stop reason: SURG

## 2022-10-18 RX ORDER — LABETALOL HYDROCHLORIDE 5 MG/ML
5 INJECTION, SOLUTION INTRAVENOUS
Status: DISCONTINUED | OUTPATIENT
Start: 2022-10-18 | End: 2022-10-18 | Stop reason: HOSPADM

## 2022-10-18 RX ORDER — MAGNESIUM HYDROXIDE 1200 MG/15ML
LIQUID ORAL
Status: COMPLETED | OUTPATIENT
Start: 2022-10-18 | End: 2022-10-18

## 2022-10-18 RX ORDER — HYDROMORPHONE HYDROCHLORIDE 1 MG/ML
0.4 INJECTION, SOLUTION INTRAMUSCULAR; INTRAVENOUS; SUBCUTANEOUS
Status: DISCONTINUED | OUTPATIENT
Start: 2022-10-18 | End: 2022-10-18 | Stop reason: HOSPADM

## 2022-10-18 RX ORDER — OXYCODONE HCL 5 MG/5 ML
5 SOLUTION, ORAL ORAL
Status: DISCONTINUED | OUTPATIENT
Start: 2022-10-18 | End: 2022-10-18 | Stop reason: HOSPADM

## 2022-10-18 RX ADMIN — GABAPENTIN 100 MG: 100 CAPSULE ORAL at 13:44

## 2022-10-18 RX ADMIN — SODIUM CHLORIDE, POTASSIUM CHLORIDE, SODIUM LACTATE AND CALCIUM CHLORIDE: 600; 310; 30; 20 INJECTION, SOLUTION INTRAVENOUS at 21:47

## 2022-10-18 RX ADMIN — HYDRALAZINE HYDROCHLORIDE 5 MG: 20 INJECTION INTRAMUSCULAR; INTRAVENOUS at 20:23

## 2022-10-18 RX ADMIN — FENTANYL CITRATE 50 MCG: 50 INJECTION, SOLUTION INTRAMUSCULAR; INTRAVENOUS at 19:33

## 2022-10-18 RX ADMIN — 0.12% CHLORHEXIDINE GLUCONATE 15 ML: 1.2 RINSE ORAL at 21:59

## 2022-10-18 RX ADMIN — HYDRALAZINE HYDROCHLORIDE 5 MG: 20 INJECTION INTRAMUSCULAR; INTRAVENOUS at 20:49

## 2022-10-18 RX ADMIN — POTASSIUM CHLORIDE 20 MEQ: 1500 TABLET, EXTENDED RELEASE ORAL at 13:43

## 2022-10-18 RX ADMIN — FENTANYL CITRATE 50 MCG: 50 INJECTION, SOLUTION INTRAMUSCULAR; INTRAVENOUS at 18:59

## 2022-10-18 RX ADMIN — ACETAMINOPHEN 650 MG: 325 TABLET, FILM COATED ORAL at 13:43

## 2022-10-18 RX ADMIN — LIDOCAINE HYDROCHLORIDE 100 MG: 20 INJECTION, SOLUTION EPIDURAL; INFILTRATION; INTRACAUDAL at 17:25

## 2022-10-18 RX ADMIN — OXYCODONE HYDROCHLORIDE 10 MG: 10 TABLET ORAL at 03:08

## 2022-10-18 RX ADMIN — ROCURONIUM BROMIDE 50 MG: 10 INJECTION, SOLUTION INTRAVENOUS at 17:35

## 2022-10-18 RX ADMIN — GABAPENTIN 100 MG: 100 CAPSULE ORAL at 06:16

## 2022-10-18 RX ADMIN — ONDANSETRON 4 MG: 2 INJECTION INTRAMUSCULAR; INTRAVENOUS at 19:32

## 2022-10-18 RX ADMIN — OXYCODONE 5 MG: 5 TABLET ORAL at 09:12

## 2022-10-18 RX ADMIN — Medication 1 EACH: at 06:16

## 2022-10-18 RX ADMIN — PROPOFOL 200 MG: 10 INJECTION, EMULSION INTRAVENOUS at 17:30

## 2022-10-18 RX ADMIN — Medication 1 EACH: at 13:43

## 2022-10-18 RX ADMIN — METAXALONE 400 MG: 800 TABLET ORAL at 13:43

## 2022-10-18 RX ADMIN — GABAPENTIN 100 MG: 100 CAPSULE ORAL at 22:00

## 2022-10-18 RX ADMIN — DOCUSATE SODIUM 100 MG: 100 CAPSULE, LIQUID FILLED ORAL at 06:12

## 2022-10-18 RX ADMIN — CEFAZOLIN 2 G: 330 INJECTION, POWDER, FOR SOLUTION INTRAMUSCULAR; INTRAVENOUS at 17:32

## 2022-10-18 RX ADMIN — DEXAMETHASONE SODIUM PHOSPHATE 8 MG: 4 INJECTION, SOLUTION INTRA-ARTICULAR; INTRALESIONAL; INTRAMUSCULAR; INTRAVENOUS; SOFT TISSUE at 18:41

## 2022-10-18 RX ADMIN — TAMSULOSIN HYDROCHLORIDE 0.4 MG: 0.4 CAPSULE ORAL at 08:11

## 2022-10-18 RX ADMIN — POTASSIUM CHLORIDE 20 MEQ: 1500 TABLET, EXTENDED RELEASE ORAL at 06:12

## 2022-10-18 RX ADMIN — CELECOXIB 200 MG: 200 CAPSULE ORAL at 06:13

## 2022-10-18 RX ADMIN — SODIUM CHLORIDE, POTASSIUM CHLORIDE, SODIUM LACTATE AND CALCIUM CHLORIDE: 600; 310; 30; 20 INJECTION, SOLUTION INTRAVENOUS at 17:16

## 2022-10-18 RX ADMIN — HYDRALAZINE HYDROCHLORIDE 10 MG: 20 INJECTION INTRAMUSCULAR; INTRAVENOUS at 08:10

## 2022-10-18 RX ADMIN — OXYCODONE HYDROCHLORIDE 10 MG: 10 TABLET ORAL at 00:08

## 2022-10-18 RX ADMIN — SUGAMMADEX 200 MG: 100 INJECTION, SOLUTION INTRAVENOUS at 19:32

## 2022-10-18 RX ADMIN — OXYCODONE 5 MG: 5 TABLET ORAL at 06:12

## 2022-10-18 RX ADMIN — METAXALONE 400 MG: 800 TABLET ORAL at 06:13

## 2022-10-18 RX ADMIN — ACETAMINOPHEN 650 MG: 325 TABLET, FILM COATED ORAL at 06:13

## 2022-10-18 RX ADMIN — Medication 100 MG: at 17:30

## 2022-10-18 RX ADMIN — OXYCODONE 5 MG: 5 TABLET ORAL at 13:44

## 2022-10-18 ASSESSMENT — COGNITIVE AND FUNCTIONAL STATUS - GENERAL
MOBILITY SCORE: 18
STANDING UP FROM CHAIR USING ARMS: A LITTLE
CLIMB 3 TO 5 STEPS WITH RAILING: A LITTLE
MOVING TO AND FROM BED TO CHAIR: A LITTLE
WALKING IN HOSPITAL ROOM: A LITTLE
SUGGESTED CMS G CODE MODIFIER MOBILITY: CK
TURNING FROM BACK TO SIDE WHILE IN FLAT BAD: A LITTLE
MOVING FROM LYING ON BACK TO SITTING ON SIDE OF FLAT BED: A LITTLE

## 2022-10-18 ASSESSMENT — ENCOUNTER SYMPTOMS
SENSORY CHANGE: 0
ABDOMINAL PAIN: 0
TINGLING: 0
NAUSEA: 0
FEVER: 0
SHORTNESS OF BREATH: 0
SPEECH CHANGE: 0
DIZZINESS: 0
DOUBLE VISION: 0
ROS GI COMMENTS: BM PTA
VOMITING: 0
NECK PAIN: 1
CHILLS: 0
TREMORS: 0
BACK PAIN: 0
MYALGIAS: 1
COUGH: 0
HEADACHES: 0

## 2022-10-18 ASSESSMENT — PAIN DESCRIPTION - PAIN TYPE
TYPE: SURGICAL PAIN
TYPE: ACUTE PAIN
TYPE: SURGICAL PAIN
TYPE: ACUTE PAIN
TYPE: SURGICAL PAIN

## 2022-10-18 ASSESSMENT — GAIT ASSESSMENTS
DISTANCE (FEET): 100
DEVIATION: SHUFFLED GAIT;BRADYKINETIC
GAIT LEVEL OF ASSIST: STANDBY ASSIST

## 2022-10-18 NOTE — CARE PLAN
Shift Goals  Clinical Goals: monitor hemodynamics; pain control  Patient Goals: pain control; PO, comfort  Family Goals: N/A    Progress made toward(s) clinical / shift goals:        Problem: Knowledge Deficit - Standard  Goal: Patient and family/care givers will demonstrate understanding of plan of care, disease process/condition, diagnostic tests and medications  Outcome: Progressing     Problem: Pain - Standard  Goal: Alleviation of pain or a reduction in pain to the patient’s comfort goal  Outcome: Progressing

## 2022-10-18 NOTE — PROGRESS NOTES
Pt received from PACU  Pt is A&O 4  Pain 3/10   declines nausea  Tolerating PO; Strict NPO 0800 10/18   Incision HERIBERTO; Splints/casts in place. Full sensation in fingers  Barajas output +  PTA BM  SCD's on  Bed alarm on, pt mod fall risk per deric kc  PT calls appropriately   Reviewed plan of care with patient, bed in lowest position and locked, pt resting comfortably now, call light within reach, all needs met at this time. Interventions will be executed per plan of care

## 2022-10-18 NOTE — DISCHARGE PLANNING
[facility] Transitional Care Coordination    Referral from:  Jessica RENDON  Insurance Provider on Facesheet:  [employer]  Potential Rehab diagnosis:  Trauma    Chart review indicates patient has ongoing medical management and may have therapy needs to possibly meet inpatient rehab facility criteria with the goal of returning to community.      D/C Support:  Brother    Physiatry consult pended while waiting for additional information.  Would welcome PT/OT as clinically appropriate s/p bilateral wrist ORIF 10/17.  TCC will monitor.  Please reach out sooner if PMR consult requested for medical managmente.  [facility] insurance is not provider for [facility].  Anticipate post acute services in-network with [employer].      Last Covid test:    Thank you for the referral.

## 2022-10-18 NOTE — OP REPORT
DATE OF SERVICE:  10/17/2022     PREOPERATIVE DIAGNOSES:  1.  Bilateral intraarticular distal radius fractures.  2.  Ulnar neck fracture, right wrist.     POSTOPERATIVE DIAGNOSIS:  Bilateral intraarticular distal radius fractures.     PROCEDURES:  1.  Open reduction and internal fixation, right intraarticular distal radius   fracture.  2.  ORIF left intraarticular distal radius fracture.  3.  ORIF ___ ulnar neck fracture.     SURGEON:  Te Comer MD     ANESTHESIA:  General.     ESTIMATED BLOOD LOSS:  Minimal.     DRAINS:  None.     COMPLICATIONS:  None.     INDICATIONS:  The patient is a gentleman who was hiking, fell and sustained   significant injuries to both bilateral wrist, felt to benefit from operative   intervention.  I explained the risks, benefits, complication and alternatives   of surgery.  All questions were answered.  No guarantees given.  Signed   consent was obtained.     DESCRIPTION OF PROCEDURE:  The patient was taken to the operating room, laid   supine on the operating table.  All bony prominences well padded.  Good   general was obtained without difficulty.  We started on the right upper   extremity, was prepped and draped in the usual sterile fashion using a   ChloraPrep.  Limb was exsanguinated with elevation, tourniquet raised, total   tourniquet time was under 2 hours.  I subsequently made a volar incision along   the FCR sheath, subsheath, pronator quadratus, brachioradialis were released.    I went down to the fracture curetted, cleaned.  I was able to reduce the   volar ulnar corner piece.  It was pretty far distally.  I predrilled with a   couple of 0.54 K-wires.  I placed a volar buttress pin plate from the [employer]Med   fragment specific fixation, I was underneath the subchondral bone to capture   that volar piece.  I secured it proximally with 2 washers and 2 screws.  I   then to compress the articular component the radial styloid piece, I made an   incision in the snuffbox through  skin and subcutaneous tissue was bluntly   dissected, open the first compartment.  I had already released   brachioradialis.  I was able to slide a radial pin plate along the radial   side.  I secured it proximally with 4 bicortical screws.  It helped reduce and   compress the radial styloid fracture.  I locked it in distally with a wire   that was bent, cut, impacted into place that helped support the radial column.    His bone was quite thin along the radial shaft.  Using the ulnar pin plate   and fashioned it to the radial side.  It was secured proximally with couple   bicortical screws.  I used a washer bent, cut, impacted and placed into the   radial styloid fragment.  It was viewed under fluoroscopy, found to be in good   position, reasonable reduction of the length, inclination, tilt.  Wound   irrigated.  I provisionally just loosely closed the skin.  Turning my   attention to the ulna.  To try and keep him below the elbow I needed to secure   the ulnar neck metaphyseal fracture, so we could pronate, supinate.  I made   an incision along the ulnar aspect of skin through skin and subcutaneous   tissue was bluntly dissected out distally.  I placed a K-wire from the TriMed   system down the shaft.  I then used an ulnar pin plate slid it over the wire   and secured it to the shaft with 3 bicortical screws.  Wire was then bent,   cut, impacted into place.  The construct was viewed under fluoroscopy.  Distal   radioulnar joint appeared to be stable through pronation and supination.    Wounds were irrigated.  I then let down the tourniquet.  We had good   hemostasis.  The ulnar wound was closed with Vicryl, subcutaneous, skin with   staples, volar wound was closed with Vicryl and staples.  The wound in the   snuffbox was closed with nylon in simple fashion.  Local without epinephrine   was injected.  Sterile dressing, Xeroform, 4x4's, Sof-Rol and an   anterior-posterior splint was applied.  We then turned our  attention to the   left side.  This was then prepped and draped in the usual sterile fashion   using a ChloraPrep.  Limb was exsanguinated with elevation and tourniquet was   raised.  Again, total tourniquet time was under 2 hours.  We again started on   the volar side, made an incision through skin and subcutaneous tissue was   bluntly dissected.  A FCR tendon was mobilized.  I again took down   brachioradialis and pronator quadratus, went to the fracture site, reduced the   more volar fragment, was a little bit larger than, appeared to be 1 piece, to   secure that volar side of the wrist I again using the TriMed fragment   specific fixation, I placed an ulnar pin plate that was predrilled with a   couple of 0.062 K-wires, a 6-hole hook plate was placed along the ulnar   aspect.  It was secured proximally with some  bicortical screws.  He was still   on the loose some of his dorsal tilt.  He had an articular fragments   dorsally, ___ try and push this over and buttress and allow for a little   better stability of the distal radioulnar joint.  I went dorsally through skin   and subcutaneous tissues, retracted the third compartment out, elevated the   fourth and second, removed the posterior interosseous nerve.  I then took the   dorsal ulnar pin plate placed along it that side and secured it with some   bicortical screws.  This helped to kind of pushed over and correct some of the   dorsal tilt.  I then went volarly and subsequently locked in the hook plate   with a fully threaded locking screw distally to make it more of a fixed angle   device.  I then secured the wire dorsally that was bent, cut, impacted through   the dorsal ulnar pin plate.  I then secured the more of the radial column,   initially tried a hook plate, but it went through the fracture site.  I   subsequently used a volar buttress pin plate just underneath the subchondral   bone.  It was predrilled and impacted and secured with a washer screw    combination.  I placed two 0.062 K-wires into the radial styloid and cut them   below the skin.  The construct was viewed under fluoroscopy, appeared to be a   reasonable reduction of length, inclination, and tilt.  Distal radioulnar   joint appeared reduced.  The wounds were irrigated.  Tourniquet was let down.    Hemostasis was obtained with electrocautery.  The wound was closed with   Vicryl and staples.  Local without epinephrine was injected.  Sterile dressing   of Xeroform, 4x4's, Sof-Rol and an anterior-posterior splint was applied.    All needle, sponge counts were correct.  The patient tolerated the procedure   well and was transferred to recovery room in stable condition.        ______________________________  MD ROYAL OCAMPO/GURVINDER    DD:  10/18/2022 10:49  DT:  10/18/2022 12:19    Job#:  [id number]

## 2022-10-18 NOTE — PROGRESS NOTES
No events  Surgery went well last night  OR today for open reduction and internal fixation of bilateral lefort fractures with malocclusion

## 2022-10-18 NOTE — PROGRESS NOTES
Trauma / Surgical Daily Progress Note    Date of Service  10/18/2022    Chief Complaint  62 y.o. male admitted 10/13/2022 with Trauma, hiking and fall, spinal fracture, facial fxs, bilateral wrists fxs    POD #1 Bilateral wrist ORIF    Interval Events  Pain control adequate  Facial fixation pending  Therapy evaluations pending    - Trial Barajas removal  - Rehab referral placed     Review of Systems  Review of Systems   Constitutional:  Negative for chills and fever.   Eyes:  Negative for double vision.   Respiratory:  Negative for cough and shortness of breath.    Cardiovascular:  Negative for chest pain.   Gastrointestinal:  Negative for abdominal pain, nausea and vomiting.        BM PTA   Musculoskeletal:  Positive for joint pain, myalgias and neck pain. Negative for back pain.   Neurological:  Negative for dizziness, tingling, tremors, sensory change, speech change and headaches.      Vital Signs  Temp:  [36.6 °C (97.9 °F)-37.6 °C (99.7 °F)] 36.6 °C (97.9 °F)  Pulse:  [] 102  Resp:  [12-21] 15  BP: (123-186)/(61-85) 141/61  SpO2:  [92 %-100 %] 97 %    Physical Exam  Physical Exam  Vitals and nursing note reviewed.   Constitutional:       General: He is awake. He is not in acute distress.     Appearance: He is not toxic-appearing.      Interventions: Cervical collar and nasal cannula in place.   HENT:      Head: Normocephalic.      Comments: Significant facial trauma.  Lip laceration approximated.      Mouth/Throat:      Mouth: Mucous membranes are moist.      Pharynx: Oropharynx is clear.   Eyes:      Conjunctiva/sclera: Conjunctivae normal.      Comments: Bilateral periorbital ecchymosis.    Cardiovascular:      Rate and Rhythm: Normal rate and regular rhythm.      Pulses: Normal pulses.   Pulmonary:      Effort: Pulmonary effort is normal. No respiratory distress.   Chest:      Chest wall: No tenderness.   Abdominal:      General: There is no distension.      Palpations: Abdomen is soft.      Tenderness:  There is no abdominal tenderness. There is no guarding.   Genitourinary:     Comments: Barajas in place with yellow urine  Musculoskeletal:         General: Tenderness and signs of injury present.      Comments: Splint to bilateral upper extremities, wiggles fingers bilaterally    Skin:     General: Skin is warm and dry.      Capillary Refill: Capillary refill takes less than 2 seconds.      Comments: Scattered abrasions   Neurological:      Mental Status: He is alert and oriented to person, place, and time.   Psychiatric:         Behavior: Behavior normal. Behavior is cooperative.       Laboratory  Recent Results (from the past 24 hour(s))   CBC with Differential: Tomorrow AM    Collection Time: 10/18/22  3:49 AM   Result Value Ref Range    WBC 13.9 (H) 4.8 - 10.8 K/uL    RBC 4.13 (L) 4.70 - 6.10 M/uL    Hemoglobin 12.3 (L) 14.0 - 18.0 g/dL    Hematocrit 37.2 (L) 42.0 - 52.0 %    MCV 90.1 81.4 - 97.8 fL    MCH 29.8 27.0 - 33.0 pg    MCHC 33.1 (L) 33.7 - 35.3 g/dL    RDW 40.3 35.9 - 50.0 fL    Platelet Count 274 164 - 446 K/uL    MPV 9.9 9.0 - 12.9 fL    Neutrophils-Polys 81.70 (H) 44.00 - 72.00 %    Lymphocytes 11.60 (L) 22.00 - 41.00 %    Monocytes 6.10 0.00 - 13.40 %    Eosinophils 0.10 0.00 - 6.90 %    Basophils 0.10 0.00 - 1.80 %    Immature Granulocytes 0.40 0.00 - 0.90 %    Nucleated RBC 0.00 /100 WBC    Neutrophils (Absolute) 11.37 (H) 1.82 - 7.42 K/uL    Lymphs (Absolute) 1.61 1.00 - 4.80 K/uL    Monos (Absolute) 0.85 0.00 - 0.85 K/uL    Eos (Absolute) 0.01 0.00 - 0.51 K/uL    Baso (Absolute) 0.02 0.00 - 0.12 K/uL    Immature Granulocytes (abs) 0.06 0.00 - 0.11 K/uL    NRBC (Absolute) 0.00 K/uL   Basic Metabolic Panel (BMP): Tomorrow AM    Collection Time: 10/18/22  3:49 AM   Result Value Ref Range    Sodium 135 135 - 145 mmol/L    Potassium 3.8 3.6 - 5.5 mmol/L    Chloride 99 96 - 112 mmol/L    Co2 25 20 - 33 mmol/L    Glucose 118 (H) 65 - 99 mg/dL    Bun 12 8 - 22 mg/dL    Creatinine 0.52 0.50 - 1.40 mg/dL     Calcium 7.8 (L) 8.5 - 10.5 mg/dL    Anion Gap 11.0 7.0 - 16.0   ESTIMATED GFR    Collection Time: 10/18/22  3:49 AM   Result Value Ref Range    GFR (CKD-EPI) 113 >60 mL/min/1.73 m 2       Fluids    Intake/Output Summary (Last 24 hours) at 10/18/2022 0931  Last data filed at 10/18/2022 0400  Gross per 24 hour   Intake 1950 ml   Output 3500 ml   Net -1550 ml       Core Measures & Quality Metrics  Labs reviewed, Medications reviewed and Radiology images reviewed  Barajas catheter: Urinary Tract Retention or Urinary Tract Obstruction      DVT Prophylaxis: Enoxaparin (Lovenox)  DVT prophylaxis - mechanical: SCDs  Ulcer prophylaxis: Not indicated      RAP Score Total: 5  CAGE Results: negative Blood Alcohol>0.08: no     Assessment/Plan  Multiple facial fractures, closed, initial encounter ([facility])- (present on admission)  Assessment & Plan  Fractures of the anterior, medial and superior maxillary sinuses bilaterally, fracture of the right lateral maxillary sinus, bilateral nasal fractures.  Comminuted frontal sinus fracture which appears to involve superior orbital walls bilaterally.    Comminuted displaced fracture of the right superior medial orbit, no retro-orbital hematoma.  Bilateral pterygoid plate fractures.  LeForte III type fracture  Keep HOB elevated  Definitive operative reduction and stabilization pending. Tentative 10/18  Huey Ahuja MD. Plastic Surgeon. [facility].       Urinary retention- (present on admission)  Assessment & Plan  10/14 Barajas placed for retention  10/15 Start Flomax   10/19 Trial Barajas removal    Traumatic closed displaced fracture of distal end of radius and ulna, left, initial encounter- (present on admission)  Assessment & Plan  Comminuted intra-articular distal radius fracture on left.    Significant displacement of large radial sided fracture fragment.  Comminuted fracture of distal ulna.  Splinted at referring facility.  Reduced in ED with conscious sedation  CT  bilateral wrists completed  10/17 ORIF distal radius.  Weight bearing status - Platform weightbearing LUE. ADL's ok with hands under 5 lbs  Te Comer MD. Orthopedic Surgeon. [facility]. and Deyvi Ocampo MD. Orthopedic Surgeon. [facility].      Closed fracture distal radius and ulna, right, initial encounter- (present on admission)  Assessment & Plan  Comminuted dorsally angulated intra-articular distal radius fracture with impaction.    Mildly distracted fracture of the base of the ulnar styloid.  Splinted at referring facility  Reduced in ED with conscious sedation  CT bilateral wrists completed  10/18 ORIF distal radius.  Weight bearing status - Platform weightbearing RUE. ADL's ok with hands under 5 lbs  Te Comer MD. Orthopedic Surgeon. [facility].      Closed fracture of third cervical vertebra, initial encounter ([facility])- (present on admission)  Assessment & Plan  Fracture of the anterior inferior corner of the C3 vertebral body with slight anterior displacement approximately 3 mm.  Equivocal nondisplaced fracture line extending to superior endplate of the vertebral body.  Left C3-4 facet mildly widened with posterior displacement of C3 facet in relation to C4  CTA neck within normal limits  Non-operative management.   Cervical immobilization.   Recommend rigid cervical orthosis. Follow up in clinic 6 weeks with AP / lateral cervical spine xrays   Garrett Edgar MD, PhD. Neurosurgeon. [facility].        Lip laceration- (present on admission)  Assessment & Plan  Repaired in ED with absorbable suture     No contraindication to deep vein thrombosis (DVT) prophylaxis- (present on admission)  Assessment & Plan  Prophylactic anticoagulation for thrombotic prevention initially contraindicated secondary to elevated bleeding risk.  10/14 Prophylactic dose enoxaparin initiated.       Finger dislocation, initial encounter- (present on admission)  Assessment &  Plan  Dorsal dislocation of the PIP joint of the long finger without associated fracture.  Reduced at referring facility.     Trauma- (present on admission)  Assessment & Plan  Fall while hiking  Trauma Green Transfer Activation.  Marck Marquez MD. Trauma Surgery.         Discussed patient condition with RN, , Patient, and trauma surgery, Dr. STEVE Marquez.

## 2022-10-18 NOTE — ANESTHESIA POSTPROCEDURE EVALUATION
Patient: Leobardo Young    Procedure Summary     Date: 10/17/22 Room / Location: [place] [address] / SURGERY [facility]    Anesthesia Start: 1632 Anesthesia Stop: 2037    Procedure: OPEN REDUCTION INTERNAL FIXATION BILATERAL WRIST (Bilateral: Wrist) Diagnosis: (BILATERAL DISTAL RADIUS FRACTURES)    Surgeons: Te Comer M.D. Responsible Provider: Marck Perry M.D.    Anesthesia Type: general ASA Status: 2          Final Anesthesia Type: general  Last vitals  BP   Blood Pressure: 128/71    Temp   36.7 °C (98 °F)    Pulse   (!) 101   Resp   16    SpO2   100 %      Anesthesia Post Evaluation    Patient location during evaluation: PACU  Patient participation: complete - patient participated  Level of consciousness: awake and alert    Airway patency: patent  Anesthetic complications: no  Cardiovascular status: hemodynamically stable  Respiratory status: acceptable  Hydration status: euvolemic    PONV: none          No notable events documented.     Nurse Pain Score: 7 (NPRS)

## 2022-10-18 NOTE — ANESTHESIA PROCEDURE NOTES
Airway    Date/Time: 10/17/2022 4:40 PM  Performed by: Best Chun D.O.  Authorized by: Best Chun D.O.     Location:  OR  Urgency:  Elective  Indications for Airway Management:  Anesthesia      Spontaneous Ventilation: absent    Sedation Level:  Deep  Preoxygenated: Yes    Mask Difficulty Assessment:  0 - not attempted  Final Airway Type:  Supraglottic airway  Final Supraglottic Airway:  Standard LMA    SGA Size:  5  Number of Attempts at Approach:  1

## 2022-10-18 NOTE — PROGRESS NOTES
Splints and swelling ok  Finger motion good  B/L wrists orif looking good and stable    Imp: B/L orif distal radius    Plan:  Stable for DC from wrist standpoint   Encourage elevation and finger motion   ADL's ok with hands under 5 lbs   Non WB through wrists ok through forearm  Needs FU with Ortho in [place] 10-14 days

## 2022-10-18 NOTE — ANESTHESIA TIME REPORT
Anesthesia Start and Stop Event Times     Date Time Event    10/17/2022 1604 Ready for Procedure     1632 Anesthesia Start     2037 Anesthesia Stop        Responsible Staff  10/17/22    Name Role Begin End    Best Chun D.O. Anesth 1632 1803    Rafa Marte III, M.D. Anesth 1803 2009    Marck Perry M.D. Anesth 2009 2037        Overtime Reason:  no overtime (within assigned shift)    Comments:

## 2022-10-18 NOTE — OR NURSING
Report received from Jennifer RN  Awaiting transport  Patient awake and talking; alert and orientated  Tolerating fluids without difficulty; no complaints of nausea  Hard cervical collar in place; facial edema noted

## 2022-10-18 NOTE — DISCHARGE PLANNING
Follow up for post acute services , per chart Limited PT need for IRF level of care. Not meeting CMS criteria for IRF level of care. Anticipate outpatient support when medically cleared. [facility] is not a provider for [facility]. No physiatry consult ordered per protocol.

## 2022-10-18 NOTE — THERAPY
"Physical Therapy   Initial Evaluation     Patient Name: Leobardo Young  Age:  62 y.o., Sex:  male  Medical Record #: [id number]  Today's Date: 10/18/2022     Precautions  Precautions: Fall Risk;Platform Weight Bearing Right Upper Extremity;Platform Weight Bearing Left Upper Extremity;Spinal / Back Precautions ;Cervical Collar    Comments: c-collar AAT, facial fxs, BUE casts, ADL's up to 5 lbs    Assessment  Patient is 62 y.o. male admitted after a fall during a hike sustaining B distal radius fxs s/p B ORIF 10/17, facial fxs (pending surgery this afternoon), C3-C4 fx (non-op), and long finger PIP dislocation s/p reduction. Pt reports negative PMHx, now on 2L O2. Pt required SBA for ambulation with slow arash and guarded posture, did not require an AD. Required CGA to negotiate 4 steps with 1 LOB, pt able to self recover. Currently recommend placement as pt has 12 steps to enter his 2nd story apartment and lives alone; was unable to complete 12 steps this session 2/2 impaired balance, fatigue, and weakness. Defer to OT for ADL recommendations given BUE PWB. Will continue to follow to address functional deficits, pt also planned for OR this afternoon for facial fxs.     Plan    Recommend Physical Therapy 3 times per week until therapy goals are met for the following treatments:  Bed Mobility, Equipment, Gait Training, Manual Therapy, Neuro Re-Education / Balance, Self Care/Home Evaluation, Stair Training, Therapeutic Activities, and Therapeutic Exercises    DC Equipment Recommendations: Unable to determine at this time  Discharge Recommendations: Recommend post-acute placement for additional physical therapy services prior to discharge home (Will likely progress to home with HH within few PT visits)       Subjective    \"I don't have an elevator.\"      Objective     10/18/22 0941   Vitals   O2 (LPM) 2.5   O2 Delivery Device Nasal Cannula   Vitals Comments New O2 needs, on RA at baseline   Pain 0 - 10 Group "   Therapist Pain Assessment Post Activity Pain Same as Prior to Activity;Nurse Notified  (no c/o pain during session)   Prior Living Situation   Prior Services None   Housing / Facility 1 Story Apartment / Condo   Steps Into Home 12   Steps In Home 0   Rail Both Rail (Steps into Home)   Elevator No   Equipment Owned None   Lives with - Patient's Self Care Capacity Alone and Able to Care For Self   Comments Reports limited social support, lives in the [place] area   Prior Level of Functional Mobility   Bed Mobility Independent   Transfer Status Independent   Ambulation Independent   Distance Ambulation (Feet)   (community)   Assistive Devices Used None   Stairs Independent   History of Falls   History of Falls Yes   Date of Last Fall   (reason for admit)   Cognition    Cognition / Consciousness WDL   Level of Consciousness Alert   Comments Pleasant and cooperative. Flat affect   Passive ROM Lower Body   Passive ROM Lower Body WDL   Active ROM Lower Body    Active ROM Lower Body  WDL   Strength Lower Body   Lower Body Strength  X   Gross Strength Generalized Weakness, Equal Bilaterally   Comments grossly 4/5 BLE   Sensation Lower Body   Lower Extremity Sensation   WDL   Comments denies N/T BUE/LE   Lower Body Muscle Tone   Lower Body Muscle Tone  WDL   Strength Upper Body   Upper Body Strength  X   Comments not formally assessed with Overpressure, however, B shoulders and elbows at least 3/5. Able to flex/ext fingers   Upper Body Muscle Tone   Upper Body Muscle Tone  WDL   Coordination Upper Body   Coordination X   Comments limited 2/2 BUE PWB. Dificulty donning mask   Coordination Lower Body    Coordination Lower Body  WDL   Vision   Vision Comments pt wearing glasses   Balance Assessment   Sitting Balance (Static) Fair +   Sitting Balance (Dynamic) Fair   Standing Balance (Static) Fair -   Standing Balance (Dynamic) Fair -   Weight Shift Sitting Fair   Weight Shift Standing Fair   Comments no AD or LOB   Gait Analysis    Gait Level Of Assist Standby Assist   Assistive Device None   Distance (Feet) 100   # of Times Distance was Traveled 4   Deviation Shuffled Gait;Bradykinetic  (slow arash, guarded posture)   # of Stairs Climbed 4   Level of Assist with Stairs Contact Guard Assist   Weight Bearing Status BUE PWB   Comments 1 mild LOB on stairs, able to self correct. Limited stair negotiation 2/2 balance, weakness, and fatigue   Bed Mobility    Supine to Sit Supervised   Sit to Supine Supervised   Scooting Supervised   Rolling Supervised   Comments via log roll. HOB raised   Functional Mobility   Sit to Stand Standby Assist   Bed, Chair, Wheelchair Transfer Standby Assist   Transfer Method Stand Step   Mobility no AD in hallway, steps   How much difficulty does the patient currently have...   Turning over in bed (including adjusting bedclothes, sheets and blankets)? 3   Sitting down on and standing up from a chair with arms (e.g., wheelchair, bedside commode, etc.) 3   Moving from lying on back to sitting on the side of the bed? 3   How much help from another person does the patient currently need...   Moving to and from a bed to a chair (including a wheelchair)? 3   Need to walk in a hospital room? 3   Climbing 3-5 steps with a railing? 3   6 clicks Mobility Score 18   Activity Tolerance   Sitting in Chair up post   Sitting Edge of Bed <5 min   Standing 10 min   Comments limited 2/2 fatigue, weakness   Edema / Skin Assessment   Edema / Skin  X   Comments BUE edema   Short Term Goals    Short Term Goal # 1 Pt will perform supine <> sit with SPV and HOB flat in 6 visits to get in/out of bed without assistance   Short Term Goal # 2 Pt will perform STS transfers with SPV in 6 visits to improve functional independence   Short Term Goal # 3 Pt will ambulate 250ft with SPV in 6 visits to access community distances   Short Term Goal # 4 Pt will negotiate 12 steps with SPV in 6 visits to safely enter/exit his 2nd story apartment    Education Group   Education Provided Role of Physical Therapist;Cervical Precautions;Brace Wear and Care;Gait Training;Stair Training;Weight Bearing Status   Cervical Precautions Patient Response Patient;Acceptance;Explanation;Demonstration;Action Demonstration;Verbal Demonstration   Role of Physical Therapist Patient Response Patient;Acceptance;Explanation;Verbal Demonstration   Gait Training Patient Response Patient;Acceptance;Explanation;Action Demonstration   Stair Training Patient Response Patient;Acceptance;Explanation;Action Demonstration;Reinforcement Needed   Weight Bearing Status Patient Response Acceptance;Explanation;Demonstration;Verbal Demonstration;Action Demonstration   Brace Wear & Care Patient Response Patient;Acceptance;Explanation;Verbal Demonstration   Additional Comments Pt educated on self management and compensatory strategies with mobility   Problem List    Problems Impaired Bed Mobility;Impaired Transfers;Impaired Ambulation;Functional Strength Deficit;Impaired Balance;Functional ROM Deficit;Impaired Coordination;Decreased Activity Tolerance;Limited Knowledge of Post-Op Precautions   Anticipated Discharge Equipment and Recommendations   DC Equipment Recommendations Unable to determine at this time   Discharge Recommendations Recommend post-acute placement for additional physical therapy services prior to discharge home  (Will likely progress to home with HH within few PT visits)   Interdisciplinary Plan of Care Collaboration   IDT Collaboration with  Nursing  (APRN)   Patient Position at End of Therapy Seated;Call Light within Reach;Tray Table within Reach;Phone within Reach;Family / Friend in Room   Collaboration Comments RN updated   Session Information   Date / Session Number  10/18- 1 (1/3, 10/24)   Priority   (.)

## 2022-10-19 LAB
ANION GAP SERPL CALC-SCNC: 10 MMOL/L (ref 7–16)
BASOPHILS # BLD AUTO: 0.2 % (ref 0–1.8)
BASOPHILS # BLD: 0.02 K/UL (ref 0–0.12)
BUN SERPL-MCNC: 13 MG/DL (ref 8–22)
CALCIUM SERPL-MCNC: 8.2 MG/DL (ref 8.5–10.5)
CHLORIDE SERPL-SCNC: 103 MMOL/L (ref 96–112)
CO2 SERPL-SCNC: 25 MMOL/L (ref 20–33)
CREAT SERPL-MCNC: 0.47 MG/DL (ref 0.5–1.4)
EOSINOPHIL # BLD AUTO: 0.02 K/UL (ref 0–0.51)
EOSINOPHIL NFR BLD: 0.2 % (ref 0–6.9)
ERYTHROCYTE [DISTWIDTH] IN BLOOD BY AUTOMATED COUNT: 42.4 FL (ref 35.9–50)
GFR SERPLBLD CREATININE-BSD FMLA CKD-EPI: 116 ML/MIN/1.73 M 2
GLUCOSE SERPL-MCNC: 150 MG/DL (ref 65–99)
HCT VFR BLD AUTO: 33 % (ref 42–52)
HGB BLD-MCNC: 11.1 G/DL (ref 14–18)
IMM GRANULOCYTES # BLD AUTO: 0.07 K/UL (ref 0–0.11)
IMM GRANULOCYTES NFR BLD AUTO: 0.7 % (ref 0–0.9)
LYMPHOCYTES # BLD AUTO: 1.36 K/UL (ref 1–4.8)
LYMPHOCYTES NFR BLD: 13.5 % (ref 22–41)
MCH RBC QN AUTO: 30.4 PG (ref 27–33)
MCHC RBC AUTO-ENTMCNC: 33.6 G/DL (ref 33.7–35.3)
MCV RBC AUTO: 90.4 FL (ref 81.4–97.8)
MONOCYTES # BLD AUTO: 0.36 K/UL (ref 0–0.85)
MONOCYTES NFR BLD AUTO: 3.6 % (ref 0–13.4)
NEUTROPHILS # BLD AUTO: 8.28 K/UL (ref 1.82–7.42)
NEUTROPHILS NFR BLD: 81.8 % (ref 44–72)
NRBC # BLD AUTO: 0 K/UL
NRBC BLD-RTO: 0 /100 WBC
PLATELET # BLD AUTO: 255 K/UL (ref 164–446)
PMV BLD AUTO: 9.7 FL (ref 9–12.9)
POTASSIUM SERPL-SCNC: 3.7 MMOL/L (ref 3.6–5.5)
RBC # BLD AUTO: 3.65 M/UL (ref 4.7–6.1)
SODIUM SERPL-SCNC: 138 MMOL/L (ref 135–145)
WBC # BLD AUTO: 10.1 K/UL (ref 4.8–10.8)

## 2022-10-19 PROCEDURE — 80048 BASIC METABOLIC PNL TOTAL CA: CPT

## 2022-10-19 PROCEDURE — 36415 COLL VENOUS BLD VENIPUNCTURE: CPT

## 2022-10-19 PROCEDURE — 700101 HCHG RX REV CODE 250: Performed by: SPECIALIST

## 2022-10-19 PROCEDURE — 700102 HCHG RX REV CODE 250 W/ 637 OVERRIDE(OP): Performed by: SPECIALIST

## 2022-10-19 PROCEDURE — 700111 HCHG RX REV CODE 636 W/ 250 OVERRIDE (IP): Performed by: SPECIALIST

## 2022-10-19 PROCEDURE — 85025 COMPLETE CBC W/AUTO DIFF WBC: CPT

## 2022-10-19 PROCEDURE — 700102 HCHG RX REV CODE 250 W/ 637 OVERRIDE(OP): Performed by: SURGERY

## 2022-10-19 PROCEDURE — A9270 NON-COVERED ITEM OR SERVICE: HCPCS | Performed by: PLASTIC SURGERY

## 2022-10-19 PROCEDURE — 700102 HCHG RX REV CODE 250 W/ 637 OVERRIDE(OP): Performed by: NURSE PRACTITIONER

## 2022-10-19 PROCEDURE — 700111 HCHG RX REV CODE 636 W/ 250 OVERRIDE (IP): Performed by: SURGERY

## 2022-10-19 PROCEDURE — 700102 HCHG RX REV CODE 250 W/ 637 OVERRIDE(OP): Performed by: ORTHOPAEDIC SURGERY

## 2022-10-19 PROCEDURE — 97535 SELF CARE MNGMENT TRAINING: CPT

## 2022-10-19 PROCEDURE — 700105 HCHG RX REV CODE 258: Performed by: SPECIALIST

## 2022-10-19 PROCEDURE — A9270 NON-COVERED ITEM OR SERVICE: HCPCS | Performed by: ORTHOPAEDIC SURGERY

## 2022-10-19 PROCEDURE — A9270 NON-COVERED ITEM OR SERVICE: HCPCS | Performed by: SURGERY

## 2022-10-19 PROCEDURE — 99232 SBSQ HOSP IP/OBS MODERATE 35: CPT

## 2022-10-19 PROCEDURE — A9270 NON-COVERED ITEM OR SERVICE: HCPCS | Performed by: NURSE PRACTITIONER

## 2022-10-19 PROCEDURE — 99024 POSTOP FOLLOW-UP VISIT: CPT | Performed by: ORTHOPAEDIC SURGERY

## 2022-10-19 PROCEDURE — 700102 HCHG RX REV CODE 250 W/ 637 OVERRIDE(OP): Performed by: PLASTIC SURGERY

## 2022-10-19 PROCEDURE — 97165 OT EVAL LOW COMPLEX 30 MIN: CPT

## 2022-10-19 PROCEDURE — 770001 HCHG ROOM/CARE - MED/SURG/GYN PRIV*

## 2022-10-19 PROCEDURE — A9270 NON-COVERED ITEM OR SERVICE: HCPCS | Performed by: SPECIALIST

## 2022-10-19 RX ADMIN — POTASSIUM CHLORIDE 20 MEQ: 1500 TABLET, EXTENDED RELEASE ORAL at 13:16

## 2022-10-19 RX ADMIN — ACETAMINOPHEN 650 MG: 325 TABLET, FILM COATED ORAL at 21:02

## 2022-10-19 RX ADMIN — METAXALONE 400 MG: 800 TABLET ORAL at 18:12

## 2022-10-19 RX ADMIN — GABAPENTIN 100 MG: 100 CAPSULE ORAL at 21:02

## 2022-10-19 RX ADMIN — SODIUM CHLORIDE, POTASSIUM CHLORIDE, SODIUM LACTATE AND CALCIUM CHLORIDE: 600; 310; 30; 20 INJECTION, SOLUTION INTRAVENOUS at 16:24

## 2022-10-19 RX ADMIN — HYDRALAZINE HYDROCHLORIDE 10 MG: 20 INJECTION INTRAMUSCULAR; INTRAVENOUS at 20:34

## 2022-10-19 RX ADMIN — POTASSIUM CHLORIDE 20 MEQ: 1500 TABLET, EXTENDED RELEASE ORAL at 04:57

## 2022-10-19 RX ADMIN — Medication 1 EACH: at 05:06

## 2022-10-19 RX ADMIN — ENOXAPARIN SODIUM 30 MG: 30 INJECTION SUBCUTANEOUS at 04:59

## 2022-10-19 RX ADMIN — GABAPENTIN 100 MG: 100 CAPSULE ORAL at 13:20

## 2022-10-19 RX ADMIN — ENOXAPARIN SODIUM 30 MG: 30 INJECTION SUBCUTANEOUS at 18:12

## 2022-10-19 RX ADMIN — GABAPENTIN 100 MG: 100 CAPSULE ORAL at 04:57

## 2022-10-19 RX ADMIN — DOCUSATE SODIUM 100 MG: 100 CAPSULE, LIQUID FILLED ORAL at 04:58

## 2022-10-19 RX ADMIN — CELECOXIB 200 MG: 200 CAPSULE ORAL at 18:12

## 2022-10-19 RX ADMIN — SODIUM CHLORIDE, POTASSIUM CHLORIDE, SODIUM LACTATE AND CALCIUM CHLORIDE: 600; 310; 30; 20 INJECTION, SOLUTION INTRAVENOUS at 01:52

## 2022-10-19 RX ADMIN — OXYCODONE 5 MG: 5 TABLET ORAL at 22:46

## 2022-10-19 RX ADMIN — TAMSULOSIN HYDROCHLORIDE 0.4 MG: 0.4 CAPSULE ORAL at 08:28

## 2022-10-19 RX ADMIN — POTASSIUM CHLORIDE 20 MEQ: 1500 TABLET, EXTENDED RELEASE ORAL at 18:12

## 2022-10-19 RX ADMIN — CELECOXIB 200 MG: 200 CAPSULE ORAL at 04:59

## 2022-10-19 RX ADMIN — METAXALONE 400 MG: 800 TABLET ORAL at 13:14

## 2022-10-19 RX ADMIN — OXYCODONE HYDROCHLORIDE 10 MG: 10 TABLET ORAL at 16:22

## 2022-10-19 RX ADMIN — Medication: at 12:00

## 2022-10-19 RX ADMIN — METAXALONE 400 MG: 800 TABLET ORAL at 04:57

## 2022-10-19 RX ADMIN — 0.12% CHLORHEXIDINE GLUCONATE 15 ML: 1.2 RINSE ORAL at 04:51

## 2022-10-19 RX ADMIN — SENNOSIDES AND DOCUSATE SODIUM 1 TABLET: 50; 8.6 TABLET ORAL at 21:02

## 2022-10-19 ASSESSMENT — PAIN DESCRIPTION - PAIN TYPE
TYPE: ACUTE PAIN

## 2022-10-19 ASSESSMENT — ENCOUNTER SYMPTOMS
FEVER: 0
ROS GI COMMENTS: LAST BM PTA
NAUSEA: 0
PSYCHIATRIC NEGATIVE: 1
CHILLS: 0
DIZZINESS: 1
SHORTNESS OF BREATH: 0
ABDOMINAL PAIN: 0
MYALGIAS: 1
BACK PAIN: 0
COUGH: 0
BLURRED VISION: 0
HEADACHES: 0
SENSORY CHANGE: 0
SORE THROAT: 0
EYE PAIN: 1
VOMITING: 0
NECK PAIN: 1
DOUBLE VISION: 0
TINGLING: 0

## 2022-10-19 ASSESSMENT — PATIENT HEALTH QUESTIONNAIRE - PHQ9
SUM OF ALL RESPONSES TO PHQ9 QUESTIONS 1 AND 2: 0
2. FEELING DOWN, DEPRESSED, IRRITABLE, OR HOPELESS: NOT AT ALL
1. LITTLE INTEREST OR PLEASURE IN DOING THINGS: NOT AT ALL

## 2022-10-19 ASSESSMENT — COGNITIVE AND FUNCTIONAL STATUS - GENERAL
PERSONAL GROOMING: A LITTLE
DAILY ACTIVITIY SCORE: 13
SUGGESTED CMS G CODE MODIFIER DAILY ACTIVITY: CL
DRESSING REGULAR UPPER BODY CLOTHING: A LOT
TOILETING: A LOT
DRESSING REGULAR LOWER BODY CLOTHING: TOTAL
EATING MEALS: A LITTLE
HELP NEEDED FOR BATHING: A LOT

## 2022-10-19 ASSESSMENT — ACTIVITIES OF DAILY LIVING (ADL): TOILETING: INDEPENDENT

## 2022-10-19 NOTE — PROGRESS NOTES
No new c/o  AVSS  Good digit ROM  Splints intact  No pain BLE  Good shoulder ROM bilateral  Nontender bilateral feet  No apparently additional orthopaedic injuries

## 2022-10-19 NOTE — PROGRESS NOTES
"   Orthopaedic Progress Note    Interval changes:  Patient doing well   Splints CDI    ROS - Patient denies any new issues.  Pain well controlled.    BP (!) 172/101   Pulse (!) 103   Temp 36.9 °C (98.4 °F) (Temporal)   Resp 18   Ht 1.803 m (5' 11\")   Wt 83.9 kg (185 lb)   SpO2 94%       Patient seen and examined  No acute distress  Breathing non labored  RRR  BUE splints CDI, DNVI, moves all fingers, cap refill <2 sec.     Recent Labs     10/17/22  0011 10/18/22  0349 10/19/22  0850   WBC 11.1* 13.9* 10.1   RBC 4.01* 4.13* 3.65*   HEMOGLOBIN 12.1* 12.3* 11.1*   HEMATOCRIT 35.3* 37.2* 33.0*   MCV 88.0 90.1 90.4   MCH 30.2 29.8 30.4   MCHC 34.3 33.1* 33.6*   RDW 39.8 40.3 42.4   PLATELETCT 225 274 255   MPV 10.4 9.9 9.7       Active Hospital Problems    Diagnosis     Multiple facial fractures, closed, initial encounter ([facility]) [S02.92XA]      Priority: High    Urinary retention [R33.9]      Priority: Medium    Closed fracture of third cervical vertebra, initial encounter ([facility]) [S12.200A]      Priority: Medium    Closed fracture distal radius and ulna, right, initial encounter [S52.501A, S52.601A]      Priority: Medium    Traumatic closed displaced fracture of distal end of radius and ulna, left, initial encounter [S52.502A, S52.602A]      Priority: Medium    Trauma [T14.90XA]      Priority: Low    Finger dislocation, initial encounter [S63.259A]      Priority: Low    No contraindication to deep vein thrombosis (DVT) prophylaxis [Z78.9]      Priority: Low    Lip laceration [S01.511A]      Priority: Low       Assessment/Plan:  Patient doing well   Splints CDI  POD#2 S/P   1.  Open reduction and internal fixation, right intraarticular distal radius fracture.  2.  ORIF left intraarticular distal radius fracture.  3.  ORIF left ulnar neck fracture.  Wt bearing status - NWB BUE  Wound care/Drains - Splints to left in place  Future Procedures - none planned   Lovenox: Start 10/15, Duration-until ambulatory > " 150'  Sutures/Staples out- 14 days post operatively  PT/OT-initiated  Antibiotics: Perioperative completed  DVT Prophylaxis- TEDS/SCDs/Foot pumps  [place]-none  Case Coordination for Discharge Planning - Disposition pending therapy clearance

## 2022-10-19 NOTE — OP REPORT
DATE OF SERVICE:  10/18/2022     PREOPERATIVE DIAGNOSIS:  Bilateral [place] fracture with anterior open bite   malocclusion.     POSTOPERATIVE DIAGNOSES:  Bilateral [place] fracture with anterior open bite   malocclusion, displaced depressed comminuted bilateral [place] fractures with   badly traumatized left infraorbital nerve.     PROCEDURES PERFORMED:  Open reduction and internal fixation of bilateral [place]   [place] fractures through multiple surgical approaches including interdental   fixation.     SURGEON:  Huey Ahuja MD     ASSISTANT:  None.     ANESTHESIOLOGIST:  Ted Mckenzie MD and Jaspal Mixon MD     OPERATIVE INDICATIONS:  Traumatic injury to the face.  The patient has an   anterior open bite and malocclusion.  The patient understood the risks,   benefits and alternatives of operative treatment of his bilateral [place]   fractures with malocclusion and he wishes to proceed.     OPERATIVE DESCRIPTION:  After induction of nasal endotracheal anesthesia, the   endotracheal tube was sutured to the nasal septum using an 0 silk suture.  The   patient's face and oropharynx were prepped and draped sterilely.  Forced   duction test was performed and found to be without evidence of entrapment or   restriction of eye movement.  Well lubricated scleral shields were placed.    The occlusion was judged and he had a pretty significant anterior open bite   with the anterior component being almost 1 cm.  The midface disimpaction   forceps were placed through each nostril and taking great care to hold the   head still.  The midface was disimpacted and reduced such that we had a   somewhat mobile midface and near normal centric occlusion.  I do want to note   that the patient does have a cervical fracture and although the anterior   component of his C collar was removed for purposes of the surgery.  His head   was taped and sandbags were placed on the side of his head for inline mobile   cervical stabilization.  We  always maintained this during the operating room   and we were extremely careful in manipulating the patient's facial skeleton   with regards to this.  A [employer] MatrixWAVE system was used to place the   patient in interdental fixation.  A short plate was used on the maxillary jaw   and a tall plate was used on the mandibular jaw.  Screws were placed to avoid   tooth roots and dental structures on the maxillary jaw.  On the mandibular jaw   screws were placed to avoid dental structures and nerve structures.  Once we   had the MatrixWAVE plate placed on the maxillary and mandibular jaw, the   patient was placed in interdental fixation using looped 25-gauge wire.  The   anterior open bite had been resolved with the manual disimpaction forceps and   I was able to get the patient in what appeared to be good normal centric   occlusion.  After this was established and the wires were bent, local   anesthesia was infiltrated to the operative site.  Bilateral buccal gingival   sulcus incisions were made with enough cuff of mucosa to sew to.  Soft tissue   was dissected with cautery down to the level of the maxilla and the soft   tissue was elevated off of the maxilla using a #9 elevator in the   subperiosteal plane.  Fractures were identified on the right hand side and   these were mildly displaced. On the left side, fractures were found to be   widely comminuted and multiple fragments were depressed within the maxillary   sinus.  For the most part; however, the midface which had been reduced with   the disimpaction forceps remained reduced.  The right side was done first as   this was a little simpler.  There was a fracture through the   zygomaticomaxillary buttress and this was plated with excellent purchase of   screws at least two on each side of the fracture.  There was a fracture of the   anterior maxilla adjacent to the nose and this was plated with a plate and   screws as well.  The infraorbital rim component was  plated with a curvilinear   plate.  This resulted in excellent fixation of the midface on the right hand   side.  We transitioned over the left hand side, which was more complicated   because this was significantly more comminuted.  The plate across the   zygomaticomaxillary buttress was placed first.  This was a typical L-shaped   plate that was bent to fit the defect precisely.  This plate bridged a segment   of comminuted anterior maxilla and fortunately I was able to through two of   the holes in the plate and drilling through two fragments of bone able to   catch these bones and with a 5 mm screw hold these in anatomic reduction with   excellent stabilization at the zygomaticomaxillary buttress.  Along the nose,   a 4-hole plate was used to obtain better fixation there and then across the   infraorbital rim a curvilinear plate was placed.  All of these plates were   placed by bending them to fit the defect precisely, placing screws with   excellent purchase and having at least two screw holes on each side of the   fracture.  With this done, I felt that we had adequately stabilized the   midface.  Scleral shields were removed.  Forced duction test was done which   showed no restriction of ocular movement.  The scleral shields were replaced.    I did forget to mention access to the inferior orbital rims.  This was done   in an identical manner on both sides.  A subciliary incision was made.  A skin   only flap was made for 1 cm and then a skin muscle flap made in the preseptal   plane down to the infraorbital rim.  The patient was taken out of interdental   fixation.  The oropharynx was suctioned aggressively and range of motion of   the jaw was good and occlusion was excellent.  There was no longer an open   bite and we had what appeared to be good contact and good occlusion based on   wear facets all the way around the patient's bite.  The patient's wounds were   irrigated with the antibiotic saline solution.   All wounds were closed in   layers.  The intraoral incisions were closed in multiple layers to ensure a   watertight closure.  The [employer] MatrixWAVE system was removed by removing the   maxillary and mandibular screws and the associated plate.  Antibiotic   ointment was applied to the external incision.  Ice compresses were placed on   the eyes after the scleral shields were removed.  The patient was extubated   and taken to recovery room in good condition.        ______________________________  MD TISHA OGDEN/MYLA/LELE    DD:  10/18/2022 19:59  DT:  10/18/2022 20:25    Job#:  [id number]

## 2022-10-19 NOTE — ANESTHESIA PROCEDURE NOTES
Airway    Date/Time: 10/18/2022 5:30 PM  Performed by: Ted Mckenzie M.D.  Authorized by: Ted Mckenzie M.D.     Location:  OR  Urgency:  Elective  Indications for Airway Management:  Anesthesia      Spontaneous Ventilation: absent    Sedation Level:  Deep  Preoxygenated: Yes    Patient Position:  Sniffing  MILS Maintained Throughout: Yes    Mask Difficulty Assessment:  0 - not attempted  Final Airway Type:  Endotracheal airway  Final Endotracheal Airway:  ETT and ELIZABETH tube  Cuffed: Yes    Technique Used for Successful ETT Placement:  Direct laryngoscopy    Insertion Site:  Right naris  Blade Type:  Aston  Laryngoscope Blade/Videolaryngoscope Blade Size:  3  ETT Size (mm):  7.0  Measured from:  Nares  ETT to Nares (cm):  26  Placement Verified by: auscultation and capnometry    Cormack-Lehane Classification:  Grade I - full view of glottis  Number of Attempts at Approach:  1   IV induction stopped due to IV infiltration in left neck. Continued with Inhalation induction. LMA placed. IV placed in R foot. LMA removed and nasal tube placed  Neck alignment maintained throughout.

## 2022-10-19 NOTE — PROGRESS NOTES
Pt received from PACU  Pt is A&O 4  Pain declines   declines nausea  Tolerating a Clears   BUE splints/ casts in place; orbital surgical sites weeping serosang; Cold saline compress in place changed PRN  Tatum tubing found to be kinked/ obstructed in stat lock ; Kink resolved tatum output 1L  + flatus  PTA BM  SCD's on RLE, PIV on LLE w/ order  Bed alarm on, pt mod fall risk per deric kc  Reviewed plan of care with patient, bed in lowest position and locked, pt resting comfortably now, call light within reach, all needs met at this time. Interventions will be executed per plan of care

## 2022-10-19 NOTE — THERAPY
Occupational Therapy   Initial Evaluation     Patient Name: Leobardo Young  Age:  62 y.o., Sex:  male  Medical Record #: [id number]  Today's Date: 10/19/2022     Precautions  Precautions: Fall Risk, Platform Weight Bearing Left Upper Extremity, Platform Weight Bearing Right Upper Extremity, Spinal / Back Precautions , Cervical Collar    Comments: c-collar AAT, facial fxs, BUE casts, ADL's up to 5 lbs    Assessment    Patient is 62 y.o. male admitted after a fall during a hike which caused B distal radius fx (s/p B ORIF 10/17), facial fx (s/p interdental fixation 10/18), C3-4 fx, and long finger PIP dislocation (s/p reduction). Pt participated in OT evaluation. Pt required max A for toileting, total to don socks, min A to don chapstick from a squeeze tube given modified technique, supv using B hands to drink Boost, and supv to wash face. Pt reported living alone prior to admission with little social support in the [place] area. Pt was I prior to admission. Pt educated regarding cervical precautions (handout provided), brace management, and platform WB precautions. Pt functional performance limited by impaired UE strength and ROM due to radius fx and impaired balance. Pt will benefit from skilled OT while admitted to acute care.      Plan    Recommend Occupational Therapy 4 times per week until therapy goals are met for the following treatments:  Adaptive Equipment, Self Care/Activities of Daily Living, Therapeutic Activities, and Therapeutic Exercises.    DC Equipment Recommendations: Unable to determine at this time  Discharge Recommendations: Recommend post-acute placement for additional occupational therapy services prior to discharge home (May progress to home w/ HH and family support)     Subjective    Pleasant and cooperative     Objective     10/19/22 1451   Prior Living Situation   Prior Services None   Housing / Facility 1 Story Apartment / Condo   Steps Into Home 12   Steps In Home 0   Rail Both Rail (Steps  into Home)   Elevator No   Bathroom Set up Bathtub / Shower Combination;Grab Bars   Equipment Owned Grab Bar(s) In Tub / Shower   Lives with - Patient's Self Care Capacity Alone and Able to Care For Self   Comments Pt reported that he lives alone, but has limited social support in the [place] area. He currently has family from [place] visiting.   Prior Level of ADL Function   Self Feeding Independent   Grooming / Hygiene Independent   Bathing Independent   Dressing Independent   Toileting Independent   Prior Level of IADL Function   Medication Management Independent   Laundry Independent   Kitchen Mobility Independent   Finances Independent   Home Management Independent   Shopping Independent   Prior Level Of Mobility Independent Without Device in Community;Independent Without Device in Home   Driving / Transportation Driving Independent   Occupation (Pre-Hospital Vocational) Not Employed   Leisure Interests   (Hiking)   History of Falls   History of Falls Yes   Date of Last Fall   (reason for admission, fall while hiking)   Precautions   Precautions Fall Risk;Platform Weight Bearing Left Upper Extremity;Platform Weight Bearing Right Upper Extremity;Spinal / Back Precautions ;Cervical Collar     Pain   Pain Scales 0 to 10 Scale    Pain 0 - 10 Group   Therapist Pain Assessment Post Activity Pain Same as Prior to Activity;2  (Pt reported 2/10 pain throughout, agreeable to evaluation)   Cognition    Cognition / Consciousness WDL   Level of Consciousness Alert   Comments Pleasant and cooperative   Passive ROM Upper Body   Comments B UE shoulder and elbow ROM WDL, able to move digits and bring thumb to 2 and 3 digits on L UE and finger to thumb on R UE   Active ROM Upper Body   Comments B UE shoulder and elbow ROM WDL, able to move digits and bring thumb to 2 and 3 digits on L UE and finger to thumb on R UE  (Wrist ROM limited by casts)   Strength Upper Body   Upper Body Strength  X   Right  Impaired   Left  Impaired    Gross Strength Generalized Weakness, Equal Bilaterally.    Comments tested pinch strength only, 3+/5   Sensation Upper Body   Upper Extremity Sensation    (Reported no numbness or tingling)   Upper Body Muscle Tone   Upper Body Muscle Tone  WDL   Coordination Upper Body   Coordination X   Fine Motor Coordination Impaired by casts   Balance Assessment   Sitting Balance (Static) Fair +   Sitting Balance (Dynamic) Fair   Standing Balance (Static) Fair -   Standing Balance (Dynamic) Fair -   Weight Shift Sitting Fair   Weight Shift Standing Fair   Comments No AD   Bed Mobility    Supine to Sit Supervised   Sit to Supine Supervised   Scooting Supervised   Rolling Supervised   Comments cues for log roll, HOB raised   ADL Assessment   Eating Supervision  (able to use B hands to bring Boost to mouth using a straw)   Grooming Supervision;Seated  (able to wipe face sitting in long sit on bed)   Upper Body Dressing Moderate Assist  (Able to use pinch to pull gown down, mod A to don gown; total A for c-collar)   Lower Body Dressing Total Assist  (demonstrated ability to tailor sit, however, due to limited pinch strength, unable to pull socks down)   Toileting Maximal Assist  (BM on toilet, max A pericare)   Comments Min A to put on chapstick from squeeze tube given cues for modified technique   Functional Mobility   Sit to Stand Standby Assist   Bed, Chair, Wheelchair Transfer Standby Assist   Toilet Transfers Standby Assist   Transfer Method Stand Step   Mobility EOB>toilet>EOB>chair   Comments no AD   Visual Perception   Visual Perception  X   Comments Pt reported blurred vision, able to read therapist name badge and board across room   Activity Tolerance   Sitting in Chair up to chair post   Sitting Edge of Bed >5 min   Standing ~10 min   Comments Pt limited by fatigue   Patient / Family Goals   Patient / Family Goal #1 to be more independent   Short Term Goals   Short Term Goal # 1 Pt will don gown w/ supv   Short Term  Goal # 2 Pt will perform LB dressing w/ min A and AE PRN   Short Term Goal # 3 Pt will perform toileting w/ min A   Education Group   Education Provided Role of Occupational Therapist;Activities of Daily Living;Back Safety;Weight Bearing Precautions;Use of Call Light   Role of Occupational Therapist Patient Response Patient;Family;Acceptance;Explanation;Demonstration;Verbal Demonstration;Action Demonstration   Back Safety Patient Response Patient;Family;Acceptance;Explanation;Demonstration;Handout;Verbal Demonstration;Action Demonstration   Brace Wear & Care Patient Response Patient;Acceptance;Explanation;Demonstration;Verbal Demonstration   ADL Patient Response Patient;Family;Acceptance;Explanation;Demonstration;Verbal Demonstration;Action Demonstration   Use of Call Light Patient Response Patient;Family;Acceptance;Explanation;Demonstration;Verbal Demonstration   Weight Bearing Precautions Patient Response Patient;Family;Acceptance;Explanation;Demonstration;Verbal Demonstration;Action Demonstration   Problem List   Problem List Decreased Active Daily Living Skills;Decreased Homemaking Skills;Decreased Upper Extremity Strength Right;Decreased Upper Extremity Strength Left;Decreased Upper Extremity AROM Right;Decreased Upper Extremity AROM Left;Decreased Activity Tolerance;Decreased Functional Mobility;Impaired Coordination Right Upper Extremity;Impaired Coordination Left Upper Extremity;Impaired Vision

## 2022-10-19 NOTE — ANESTHESIA PREPROCEDURE EVALUATION
Case: [id number] Date/Time: 10/18/22 4390    Procedure: OPEN TREATMENT OF FACIAL FRACTURE    Pre-op diagnosis: FACIAL FRACTURES    Location: [place] [id number] / SURGERY [facility]    Surgeons: Huey Ahuja M.D.          Relevant Problems   Other   (positive) Closed fracture of third cervical vertebra, initial encounter ([facility])   (positive) Multiple facial fractures, closed, initial encounter ([facility])   (positive) Trauma       Physical Exam    Airway   Mallampati: IV  TM distance: >3 FB  Neck ROM: limited       Cardiovascular - normal exam  Rhythm: regular  Rate: normal  (-) murmur     Dental - normal exam           Pulmonary - normal exam  Breath sounds clear to auscultation     Abdominal    Neurological - normal exam                 Anesthesia Plan    ASA 2       Plan - general       Airway plan will be ETT          Induction: intravenous      Pertinent diagnostic labs and testing reviewed    Informed Consent:    Anesthetic plan and risks discussed with patient.

## 2022-10-19 NOTE — CARE PLAN
Shift Goals  Clinical Goals: monitor hemodynamics; pain control; mobility  Patient Goals: mobility  Family Goals: N/A    Progress made toward(s) clinical / shift goals:        Problem: Knowledge Deficit - Standard  Goal: Patient and family/care givers will demonstrate understanding of plan of care, disease process/condition, diagnostic tests and medications  Outcome: Progressing     Problem: Pain - Standard  Goal: Alleviation of pain or a reduction in pain to the patient’s comfort goal  Outcome: Progressing

## 2022-10-19 NOTE — OR SURGEON
Immediate Post OP Note    PreOp Diagnosis: bilateral lefort fractures with malocclusion      PostOp Diagnosis: same      Procedure(s):  OPEN TREATMENT OF FACIAL FRACTURE - Wound Class: Clean Contaminated    Surgeon(s):  Huey Ahuja M.D.    Anesthesiologist/Type of Anesthesia:  Anesthesiologist: Jaspal Mixon M.D.; Ted Mckenzie M.D./General    Surgical Staff:  Circulator: Renu Hernandez R.N.  Scrub Person: Arlene aCrbajal [place]: Laine Akhtar R.N.    Specimens removed if any:  * No specimens in log *    Estimated Blood Loss: 10 ml    Findings: comminuted depressed/displaced bilateral lefort fractures with anterior open bite, left inferior orbital nerve badly damaged/contused     Complications: none        10/18/2022 7:40 PM Huey Ahuja M.D.

## 2022-10-19 NOTE — CARE PLAN
Problem: Knowledge Deficit - Standard  Goal: Patient and family/care givers will demonstrate understanding of plan of care, disease process/condition, diagnostic tests and medications  Outcome: Progressing     Problem: Pain - Standard  Goal: Alleviation of pain or a reduction in pain to the patient’s comfort goal  Outcome: Progressing     Problem: Fall Risk  Goal: Patient will remain free from falls  Outcome: Progressing   The patient is Stable - Low risk of patient condition declining or worsening    Shift Goals  Clinical Goals: decrease swelling and keep limbs stabalized  Patient Goals: pain relief  Family Goals: pain control    Progress made toward(s) clinical / shift goals:  pain control and limb alignment    Patient is not progressing towards the following goals:

## 2022-10-19 NOTE — PROGRESS NOTES
Trauma / Surgical Daily Progress Note    Date of Service  10/19/2022    Chief Complaint  62 y.o. male admitted 10/13/2022 with Trauma, hiking and fall, spinal fracture, facial fxs, bilateral wrists fxs    10/17 ORIF intraarticular distal radius fracture. ORIF left intraarticular distal radius fracture. ORIF ulnar neck fracture.    10/18 ORIF bilateral Le Fort fractures through multiple surgical approaches including interdental fixation.    Interval Events  Doing well. Adequate pain control.  Facial fracture fixed yesterday.  Reviewed PT recommendations.    - Trial tatum removal today.   - Aggressive pulmonary hygiene with goal to wean off oxygen.  - Mobilize.  - [facility] not a provider for [facility] jan.   - Patient states he has no family near by. Asked case management/ to see patient for assistance with discharge planning.      Review of Systems  Review of Systems   Constitutional:  Negative for chills and fever.   HENT:  Negative for hearing loss and sore throat.    Eyes:  Positive for pain. Negative for blurred vision and double vision.   Respiratory:  Negative for cough and shortness of breath.    Cardiovascular:  Negative for chest pain.   Gastrointestinal:  Negative for abdominal pain, nausea and vomiting.        Last BM PTA   Musculoskeletal:  Positive for joint pain, myalgias and neck pain. Negative for back pain.   Skin: Negative.    Neurological:  Positive for dizziness (when sitting at the edge of the bed). Negative for tingling, sensory change and headaches.   Endo/Heme/Allergies: Negative.    Psychiatric/Behavioral: Negative.     All other systems reviewed and are negative.     Vital Signs  Temp:  [36.6 °C (97.8 °F)-37.5 °C (99.5 °F)] 37.1 °C (98.7 °F)  Pulse:  [] 94  Resp:  [12-18] 18  BP: (131-191)/(66-81) 166/77  SpO2:  [91 %-96 %] 95 %    Physical Exam  Physical Exam  Vitals and nursing note reviewed.   Constitutional:       General: He is awake. He is not in acute distress.      Appearance: He is not toxic-appearing.      Interventions: Cervical collar and face mask in place.   HENT:      Head: Normocephalic.      Comments: Significant facial trauma.  Lip laceration approximated.   Under eyes sutured       Nose: Congestion present.      Mouth/Throat:      Mouth: Mucous membranes are dry.      Pharynx: Oropharynx is clear.   Eyes:      Conjunctiva/sclera:      Right eye: Right conjunctiva is injected.      Left eye: Left conjunctiva is injected.      Comments: Bilateral periorbital ecchymosis.    Neck:      Comments: Ccollar in place  Cardiovascular:      Rate and Rhythm: Normal rate and regular rhythm.      Pulses: Normal pulses.   Pulmonary:      Effort: Pulmonary effort is normal. No respiratory distress.      Breath sounds: Examination of the right-middle field reveals decreased breath sounds. Examination of the left-middle field reveals decreased breath sounds. Examination of the right-lower field reveals decreased breath sounds. Examination of the left-lower field reveals decreased breath sounds. Decreased breath sounds present.   Chest:      Chest wall: No tenderness.   Abdominal:      General: Bowel sounds are normal. There is no distension.      Palpations: Abdomen is soft.      Tenderness: There is no abdominal tenderness. There is no guarding.   Genitourinary:     Comments: Barajas in place with yellow urine  Musculoskeletal:         General: Tenderness and signs of injury present.      Cervical back: Tenderness present.      Comments: Splint to bilateral upper extremities, +CMS   Skin:     General: Skin is warm and dry.      Capillary Refill: Capillary refill takes less than 2 seconds.      Findings: Bruising present.      Comments: Scattered abrasions   Neurological:      Mental Status: He is alert and oriented to person, place, and time.   Psychiatric:         Mood and Affect: Mood normal.         Behavior: Behavior normal. Behavior is cooperative.       Laboratory  Recent  Results (from the past 24 hour(s))   CBC with Differential: Tomorrow AM    Collection Time: 10/19/22  8:50 AM   Result Value Ref Range    WBC 10.1 4.8 - 10.8 K/uL    RBC 3.65 (L) 4.70 - 6.10 M/uL    Hemoglobin 11.1 (L) 14.0 - 18.0 g/dL    Hematocrit 33.0 (L) 42.0 - 52.0 %    MCV 90.4 81.4 - 97.8 fL    MCH 30.4 27.0 - 33.0 pg    MCHC 33.6 (L) 33.7 - 35.3 g/dL    RDW 42.4 35.9 - 50.0 fL    Platelet Count 255 164 - 446 K/uL    MPV 9.7 9.0 - 12.9 fL    Neutrophils-Polys 81.80 (H) 44.00 - 72.00 %    Lymphocytes 13.50 (L) 22.00 - 41.00 %    Monocytes 3.60 0.00 - 13.40 %    Eosinophils 0.20 0.00 - 6.90 %    Basophils 0.20 0.00 - 1.80 %    Immature Granulocytes 0.70 0.00 - 0.90 %    Nucleated RBC 0.00 /100 WBC    Neutrophils (Absolute) 8.28 (H) 1.82 - 7.42 K/uL    Lymphs (Absolute) 1.36 1.00 - 4.80 K/uL    Monos (Absolute) 0.36 0.00 - 0.85 K/uL    Eos (Absolute) 0.02 0.00 - 0.51 K/uL    Baso (Absolute) 0.02 0.00 - 0.12 K/uL    Immature Granulocytes (abs) 0.07 0.00 - 0.11 K/uL    NRBC (Absolute) 0.00 K/uL   Basic Metabolic Panel (BMP): Tomorrow AM    Collection Time: 10/19/22  8:50 AM   Result Value Ref Range    Sodium 138 135 - 145 mmol/L    Potassium 3.7 3.6 - 5.5 mmol/L    Chloride 103 96 - 112 mmol/L    Co2 25 20 - 33 mmol/L    Glucose 150 (H) 65 - 99 mg/dL    Bun 13 8 - 22 mg/dL    Creatinine 0.47 (L) 0.50 - 1.40 mg/dL    Calcium 8.2 (L) 8.5 - 10.5 mg/dL    Anion Gap 10.0 7.0 - 16.0   ESTIMATED GFR    Collection Time: 10/19/22  8:50 AM   Result Value Ref Range    GFR (CKD-EPI) 116 >60 mL/min/1.73 m 2       Fluids    Intake/Output Summary (Last 24 hours) at 10/19/2022 1108  Last data filed at 10/19/2022 0600  Gross per 24 hour   Intake 4631.67 ml   Output 6100 ml   Net -1468.33 ml       Core Measures & Quality Metrics  Labs reviewed, Medications reviewed and Radiology images reviewed  Tatum catheter: Urinary Tract Retention or Urinary Tract Obstruction (trial tatum removal)      DVT Prophylaxis: Enoxaparin (Lovenox)  DVT  prophylaxis - mechanical: SCDs  Ulcer prophylaxis: Not indicated      RAP Score Total: 5  CAGE Results: negative Blood Alcohol>0.08: no     Assessment/Plan  Multiple facial fractures, closed, initial encounter ([facility])- (present on admission)  Assessment & Plan  Fractures of the anterior, medial and superior maxillary sinuses bilaterally, fracture of the right lateral maxillary sinus, bilateral nasal fractures.  Comminuted frontal sinus fracture which appears to involve superior orbital walls bilaterally.    Comminuted displaced fracture of the right superior medial orbit, no retro-orbital hematoma.  Bilateral pterygoid plate fractures.  LeForte III type fracture  Keep HOB elevated  10/18 ORIF bilateral Le Fort fractures through multiple surgical approaches including interdental   fixation.  Huey Ahuja MD. Plastic Surgeon. [facility].    Urinary retention- (present on admission)  Assessment & Plan  10/14 Barajas placed for retention  10/15 Start Flomax   10/19 Trial Barajas removal    Traumatic closed displaced fracture of distal end of radius and ulna, left, initial encounter- (present on admission)  Assessment & Plan  Comminuted intra-articular distal radius fracture on left.    Significant displacement of large radial sided fracture fragment.  Comminuted fracture of distal ulna.  Splinted at referring facility.  Reduced in ED with conscious sedation  CT bilateral wrists completed  10/17 ORIF distal radius.  Weight bearing status - Platform weightbearing LUE. ADL's ok with hands under 5 lbs  Te Comer MD. Orthopedic Surgeon. [facility]. and Deyvi Ocampo MD. Orthopedic Surgeon. [facility].      Closed fracture distal radius and ulna, right, initial encounter- (present on admission)  Assessment & Plan  Comminuted dorsally angulated intra-articular distal radius fracture with impaction.    Mildly distracted fracture of the base of the ulnar styloid.  Splinted at referring  facility  Reduced in ED with conscious sedation  CT bilateral wrists completed  10/18 ORIF distal radius.  Weight bearing status - Platform weightbearing RUE. ADL's ok with hands under 5 lbs  Te Comer MD. Orthopedic Surgeon. [facility].      Closed fracture of third cervical vertebra, initial encounter ([facility])- (present on admission)  Assessment & Plan  Fracture of the anterior inferior corner of the C3 vertebral body with slight anterior displacement approximately 3 mm.  Equivocal nondisplaced fracture line extending to superior endplate of the vertebral body.  Left C3-4 facet mildly widened with posterior displacement of C3 facet in relation to C4  CTA neck within normal limits  Non-operative management.   Cervical immobilization.   Recommend rigid cervical orthosis. Follow up in clinic 6 weeks with AP / lateral cervical spine xrays   Garrett Edgar MD, PhD. Neurosurgeon. [facility].        Lip laceration- (present on admission)  Assessment & Plan  Repaired in ED with absorbable suture     No contraindication to deep vein thrombosis (DVT) prophylaxis- (present on admission)  Assessment & Plan  Prophylactic anticoagulation for thrombotic prevention initially contraindicated secondary to elevated bleeding risk.  10/14 Prophylactic dose enoxaparin initiated.       Finger dislocation, initial encounter- (present on admission)  Assessment & Plan  Dorsal dislocation of the PIP joint of the long finger without associated fracture.  Reduced at referring facility.     Trauma- (present on admission)  Assessment & Plan  Fall while hiking  Trauma Green Transfer Activation.  Marck Marquez MD. Trauma Surgery.       Discussed patient condition with RN, Patient, and trauma surgery, Dr. Marquez.

## 2022-10-20 PROBLEM — R33.9 URINARY RETENTION: Status: RESOLVED | Noted: 2022-10-15 | Resolved: 2022-10-20

## 2022-10-20 PROBLEM — Z02.9 DISCHARGE PLANNING ISSUES: Status: ACTIVE | Noted: 2022-10-20

## 2022-10-20 LAB
ANION GAP SERPL CALC-SCNC: 7 MMOL/L (ref 7–16)
BASOPHILS # BLD AUTO: 0.3 % (ref 0–1.8)
BASOPHILS # BLD: 0.03 K/UL (ref 0–0.12)
BUN SERPL-MCNC: 12 MG/DL (ref 8–22)
CALCIUM SERPL-MCNC: 8 MG/DL (ref 8.5–10.5)
CHLORIDE SERPL-SCNC: 104 MMOL/L (ref 96–112)
CO2 SERPL-SCNC: 25 MMOL/L (ref 20–33)
CREAT SERPL-MCNC: 0.42 MG/DL (ref 0.5–1.4)
EOSINOPHIL # BLD AUTO: 0.14 K/UL (ref 0–0.51)
EOSINOPHIL NFR BLD: 1.6 % (ref 0–6.9)
ERYTHROCYTE [DISTWIDTH] IN BLOOD BY AUTOMATED COUNT: 42.4 FL (ref 35.9–50)
GFR SERPLBLD CREATININE-BSD FMLA CKD-EPI: 121 ML/MIN/1.73 M 2
GLUCOSE SERPL-MCNC: 113 MG/DL (ref 65–99)
HCT VFR BLD AUTO: 32.6 % (ref 42–52)
HGB BLD-MCNC: 10.7 G/DL (ref 14–18)
IMM GRANULOCYTES # BLD AUTO: 0.05 K/UL (ref 0–0.11)
IMM GRANULOCYTES NFR BLD AUTO: 0.6 % (ref 0–0.9)
LYMPHOCYTES # BLD AUTO: 1.42 K/UL (ref 1–4.8)
LYMPHOCYTES NFR BLD: 16 % (ref 22–41)
MAGNESIUM SERPL-MCNC: 1.9 MG/DL (ref 1.5–2.5)
MCH RBC QN AUTO: 29.6 PG (ref 27–33)
MCHC RBC AUTO-ENTMCNC: 32.8 G/DL (ref 33.7–35.3)
MCV RBC AUTO: 90.3 FL (ref 81.4–97.8)
MONOCYTES # BLD AUTO: 0.67 K/UL (ref 0–0.85)
MONOCYTES NFR BLD AUTO: 7.6 % (ref 0–13.4)
NEUTROPHILS # BLD AUTO: 6.56 K/UL (ref 1.82–7.42)
NEUTROPHILS NFR BLD: 73.9 % (ref 44–72)
NRBC # BLD AUTO: 0 K/UL
NRBC BLD-RTO: 0 /100 WBC
PHOSPHATE SERPL-MCNC: 2.1 MG/DL (ref 2.5–4.5)
PLATELET # BLD AUTO: 260 K/UL (ref 164–446)
PMV BLD AUTO: 10 FL (ref 9–12.9)
POTASSIUM SERPL-SCNC: 3.9 MMOL/L (ref 3.6–5.5)
RBC # BLD AUTO: 3.61 M/UL (ref 4.7–6.1)
SODIUM SERPL-SCNC: 136 MMOL/L (ref 135–145)
WBC # BLD AUTO: 8.9 K/UL (ref 4.8–10.8)

## 2022-10-20 PROCEDURE — L0172 CERV COL SR FOAM 2PC PRE OTS: HCPCS

## 2022-10-20 PROCEDURE — 83735 ASSAY OF MAGNESIUM: CPT

## 2022-10-20 PROCEDURE — 700102 HCHG RX REV CODE 250 W/ 637 OVERRIDE(OP): Performed by: ORTHOPAEDIC SURGERY

## 2022-10-20 PROCEDURE — 80048 BASIC METABOLIC PNL TOTAL CA: CPT

## 2022-10-20 PROCEDURE — 770001 HCHG ROOM/CARE - MED/SURG/GYN PRIV*

## 2022-10-20 PROCEDURE — A9270 NON-COVERED ITEM OR SERVICE: HCPCS | Performed by: PLASTIC SURGERY

## 2022-10-20 PROCEDURE — 306637 HCHG MISC ORTHO ITEM RC 0274

## 2022-10-20 PROCEDURE — 700102 HCHG RX REV CODE 250 W/ 637 OVERRIDE(OP): Performed by: SPECIALIST

## 2022-10-20 PROCEDURE — 84100 ASSAY OF PHOSPHORUS: CPT

## 2022-10-20 PROCEDURE — 85025 COMPLETE CBC W/AUTO DIFF WBC: CPT

## 2022-10-20 PROCEDURE — 700102 HCHG RX REV CODE 250 W/ 637 OVERRIDE(OP)

## 2022-10-20 PROCEDURE — 700105 HCHG RX REV CODE 258: Performed by: SPECIALIST

## 2022-10-20 PROCEDURE — A9270 NON-COVERED ITEM OR SERVICE: HCPCS | Performed by: SURGERY

## 2022-10-20 PROCEDURE — A9270 NON-COVERED ITEM OR SERVICE: HCPCS

## 2022-10-20 PROCEDURE — 700111 HCHG RX REV CODE 636 W/ 250 OVERRIDE (IP): Performed by: SURGERY

## 2022-10-20 PROCEDURE — 700102 HCHG RX REV CODE 250 W/ 637 OVERRIDE(OP): Performed by: SURGERY

## 2022-10-20 PROCEDURE — 99232 SBSQ HOSP IP/OBS MODERATE 35: CPT

## 2022-10-20 PROCEDURE — 302196 LINEN, HYPOALLERGENIC: Performed by: SURGERY

## 2022-10-20 PROCEDURE — A9270 NON-COVERED ITEM OR SERVICE: HCPCS | Performed by: NURSE PRACTITIONER

## 2022-10-20 PROCEDURE — 700101 HCHG RX REV CODE 250: Performed by: SPECIALIST

## 2022-10-20 PROCEDURE — 700102 HCHG RX REV CODE 250 W/ 637 OVERRIDE(OP): Performed by: NURSE PRACTITIONER

## 2022-10-20 PROCEDURE — 700102 HCHG RX REV CODE 250 W/ 637 OVERRIDE(OP): Performed by: PLASTIC SURGERY

## 2022-10-20 PROCEDURE — A9270 NON-COVERED ITEM OR SERVICE: HCPCS | Performed by: ORTHOPAEDIC SURGERY

## 2022-10-20 PROCEDURE — 36415 COLL VENOUS BLD VENIPUNCTURE: CPT

## 2022-10-20 PROCEDURE — A9270 NON-COVERED ITEM OR SERVICE: HCPCS | Performed by: SPECIALIST

## 2022-10-20 RX ORDER — POTASSIUM CHLORIDE 20 MEQ/1
40 TABLET, EXTENDED RELEASE ORAL 3 TIMES DAILY
Status: DISCONTINUED | OUTPATIENT
Start: 2022-10-20 | End: 2022-11-03 | Stop reason: HOSPADM

## 2022-10-20 RX ADMIN — SODIUM CHLORIDE, POTASSIUM CHLORIDE, SODIUM LACTATE AND CALCIUM CHLORIDE: 600; 310; 30; 20 INJECTION, SOLUTION INTRAVENOUS at 03:04

## 2022-10-20 RX ADMIN — CELECOXIB 200 MG: 200 CAPSULE ORAL at 05:00

## 2022-10-20 RX ADMIN — ACETAMINOPHEN 650 MG: 325 TABLET, FILM COATED ORAL at 05:00

## 2022-10-20 RX ADMIN — ENOXAPARIN SODIUM 30 MG: 30 INJECTION SUBCUTANEOUS at 05:03

## 2022-10-20 RX ADMIN — POTASSIUM CHLORIDE 20 MEQ: 1500 TABLET, EXTENDED RELEASE ORAL at 05:13

## 2022-10-20 RX ADMIN — METAXALONE 400 MG: 800 TABLET ORAL at 05:00

## 2022-10-20 RX ADMIN — GABAPENTIN 100 MG: 100 CAPSULE ORAL at 13:08

## 2022-10-20 RX ADMIN — 0.12% CHLORHEXIDINE GLUCONATE 15 ML: 1.2 RINSE ORAL at 04:55

## 2022-10-20 RX ADMIN — CELECOXIB 200 MG: 200 CAPSULE ORAL at 17:41

## 2022-10-20 RX ADMIN — ENOXAPARIN SODIUM 30 MG: 30 INJECTION SUBCUTANEOUS at 17:41

## 2022-10-20 RX ADMIN — OXYCODONE 5 MG: 5 TABLET ORAL at 08:08

## 2022-10-20 RX ADMIN — GABAPENTIN 100 MG: 100 CAPSULE ORAL at 05:00

## 2022-10-20 RX ADMIN — POTASSIUM CHLORIDE 40 MEQ: 20 TABLET, EXTENDED RELEASE ORAL at 17:41

## 2022-10-20 RX ADMIN — METAXALONE 400 MG: 800 TABLET ORAL at 17:41

## 2022-10-20 RX ADMIN — TAMSULOSIN HYDROCHLORIDE 0.4 MG: 0.4 CAPSULE ORAL at 08:10

## 2022-10-20 RX ADMIN — GABAPENTIN 100 MG: 100 CAPSULE ORAL at 20:52

## 2022-10-20 RX ADMIN — SENNOSIDES AND DOCUSATE SODIUM 1 TABLET: 50; 8.6 TABLET ORAL at 20:52

## 2022-10-20 RX ADMIN — METAXALONE 400 MG: 800 TABLET ORAL at 13:08

## 2022-10-20 RX ADMIN — 0.12% CHLORHEXIDINE GLUCONATE 15 ML: 1.2 RINSE ORAL at 20:52

## 2022-10-20 RX ADMIN — Medication 1 EACH: at 13:08

## 2022-10-20 RX ADMIN — DOCUSATE SODIUM 100 MG: 100 CAPSULE, LIQUID FILLED ORAL at 05:00

## 2022-10-20 RX ADMIN — Medication 1 EACH: at 05:00

## 2022-10-20 RX ADMIN — POLYETHYLENE GLYCOL 3350 1 PACKET: 17 POWDER, FOR SOLUTION ORAL at 17:41

## 2022-10-20 RX ADMIN — POTASSIUM CHLORIDE 40 MEQ: 20 TABLET, EXTENDED RELEASE ORAL at 13:08

## 2022-10-20 RX ADMIN — POLYETHYLENE GLYCOL 3350 1 PACKET: 17 POWDER, FOR SOLUTION ORAL at 06:36

## 2022-10-20 RX ADMIN — DOCUSATE SODIUM 100 MG: 100 CAPSULE, LIQUID FILLED ORAL at 17:41

## 2022-10-20 ASSESSMENT — ENCOUNTER SYMPTOMS
HEADACHES: 0
BACK PAIN: 0
CHILLS: 0
NECK PAIN: 1
DIZZINESS: 0
NAUSEA: 0
SHORTNESS OF BREATH: 0
DOUBLE VISION: 0
BLURRED VISION: 0
MYALGIAS: 1
TINGLING: 0
EYE PAIN: 1
FEVER: 0
ABDOMINAL PAIN: 0
VOMITING: 0
PSYCHIATRIC NEGATIVE: 1
SENSORY CHANGE: 0
ROS GI COMMENTS: LAST BM PTA
COUGH: 0

## 2022-10-20 ASSESSMENT — PATIENT HEALTH QUESTIONNAIRE - PHQ9
2. FEELING DOWN, DEPRESSED, IRRITABLE, OR HOPELESS: NOT AT ALL
SUM OF ALL RESPONSES TO PHQ9 QUESTIONS 1 AND 2: 0
1. LITTLE INTEREST OR PLEASURE IN DOING THINGS: NOT AT ALL

## 2022-10-20 ASSESSMENT — PAIN DESCRIPTION - PAIN TYPE
TYPE: ACUTE PAIN

## 2022-10-20 NOTE — PROGRESS NOTES
Custom [place] collar order has been faxed to [facility].Any questions or concerns please call [employer] at

## 2022-10-20 NOTE — CARE PLAN
The patient is Stable - Low risk of patient condition declining or worsening    Shift Goals  Clinical Goals: pain control  Patient Goals: pain relief  Family Goals: pain control    Progress made toward(s) clinical / shift goals:      Report received from NOC RN.  Assessment complete.  A&O x 4. Patient  calls appropriately.  Patient up with SB assist. NWB to BUE, cast CDI.  C-collar in place at all times.   Red rash noted to patients back, hypoallergenic sheets ordered, shower given to patient.  Patient has 5/10 pain. Medicated.   Patient denies SOB.  Patient is on room air.  Denies N&V. Tolerating diet.  + void via condom cath, 10/19 BM.  Review plan with of care with patient.   Call light and personal belongings with in reach.   Hourly rounding in place.   All needs met at this time.     Patient is not progressing towards the following goals:

## 2022-10-20 NOTE — ASSESSMENT & PLAN NOTE
Date of admission: 10/13/2022.  10/18 PM&R consulted, patient declined due to [employer] insurance.   10/20 Case management assisting with placement in the [place] area.  11/1 Acceptance to [facility]. Transportation pending.   Cleared for discharge: Yes - Date: 10/25.  Discharge delayed: Yes - Reason: transportation.  Discharge date: 11/5.

## 2022-10-20 NOTE — CARE PLAN
Shift Goals  Clinical Goals: pain control and comfort  Patient Goals: pain relief  Family Goals: pain control    Progress made toward(s) clinical / shift goals:       Problem: Knowledge Deficit - Standard  Goal: Patient and family/care givers will demonstrate understanding of plan of care, disease process/condition, diagnostic tests and medications  Outcome: Progressing     Problem: Pain - Standard  Goal: Alleviation of pain or a reduction in pain to the patient’s comfort goal  Outcome: Progressing

## 2022-10-20 NOTE — PROGRESS NOTES
Trauma / Surgical Daily Progress Note    Date of Service  10/20/2022    Chief Complaint  62 y.o. male admitted 10/13/2022 with Trauma, hiking and fall, spinal fracture, facial fxs, bilateral wrists fxs     10/17 ORIF intraarticular distal radius fracture. ORIF left intraarticular distal radius fracture. ORIF ulnar neck fracture.     10/18 ORIF bilateral Le Fort fractures through multiple surgical approaches including interdental fixation.     Interval Events  Adequate pain control. Ambulating the hallway.  Rash noted to back. Possible allergy reaction to sheets ? Denies itchiness.   No BM in 3 days.    - Hypoallergenic sheets ordered.  - [place] collar ordered.  - Aggressive pulmonary hygiene.  - Mobilization. In chair for all meals.  - Okay to shower.  - Case management assisting with placement in the [place] area.  Counseled.    Review of Systems  Review of Systems   Constitutional:  Negative for chills and fever.   HENT: Negative.     Eyes:  Positive for pain. Negative for blurred vision and double vision.   Respiratory:  Negative for cough and shortness of breath.    Cardiovascular:  Negative for chest pain.   Gastrointestinal:  Negative for abdominal pain, nausea and vomiting.        Last BM PTA   Musculoskeletal:  Positive for joint pain, myalgias and neck pain. Negative for back pain.   Skin:  Negative for itching.   Neurological:  Negative for dizziness, tingling, sensory change and headaches.   Endo/Heme/Allergies: Negative.    Psychiatric/Behavioral: Negative.     All other systems reviewed and are negative.     Vital Signs  Temp:  [36.3 °C (97.3 °F)-37.2 °C (98.9 °F)] 37 °C (98.6 °F)  Pulse:  [] 100  Resp:  [17-18] 17  BP: (130-184)/() 136/74  SpO2:  [94 %-95 %] 95 %    Physical Exam  Physical Exam  Vitals and nursing note reviewed.   Constitutional:       General: He is awake. He is not in acute distress.     Appearance: He is not toxic-appearing.      Interventions: Cervical collar and  face mask in place.   HENT:      Head: Normocephalic.      Comments: Significant facial trauma.  Lip laceration approximated.   Under eyes sutured  Ecchymosis behind ears       Nose: Congestion present.      Mouth/Throat:      Mouth: Mucous membranes are dry.      Pharynx: Oropharynx is clear.   Eyes:      Conjunctiva/sclera:      Right eye: Right conjunctiva is injected.      Left eye: Left conjunctiva is injected.      Comments: Bilateral periorbital ecchymosis.    Neck:      Comments: Ccollar in place  Cardiovascular:      Rate and Rhythm: Normal rate and regular rhythm.      Pulses: Normal pulses.   Pulmonary:      Effort: Pulmonary effort is normal. No respiratory distress.      Breath sounds: Examination of the right-middle field reveals decreased breath sounds. Examination of the left-middle field reveals decreased breath sounds. Examination of the right-lower field reveals decreased breath sounds. Examination of the left-lower field reveals decreased breath sounds. Decreased breath sounds present.   Chest:      Chest wall: No tenderness.   Abdominal:      General: Bowel sounds are normal. There is no distension.      Palpations: Abdomen is soft.      Tenderness: There is no abdominal tenderness. There is no guarding.   Genitourinary:     Comments: Condom cath  Musculoskeletal:         General: Tenderness and signs of injury present.      Cervical back: Tenderness present.      Comments: Splint to bilateral upper extremities, +CMS   Skin:     General: Skin is warm and dry.      Capillary Refill: Capillary refill takes less than 2 seconds.      Findings: Bruising and rash (back) present.      Comments: Scattered abrasions   Neurological:      Mental Status: He is alert and oriented to person, place, and time.   Psychiatric:         Mood and Affect: Mood normal.         Behavior: Behavior normal. Behavior is cooperative.       Laboratory  Recent Results (from the past 24 hour(s))   CBC with Differential:  Tomorrow AM    Collection Time: 10/20/22  3:39 AM   Result Value Ref Range    WBC 8.9 4.8 - 10.8 K/uL    RBC 3.61 (L) 4.70 - 6.10 M/uL    Hemoglobin 10.7 (L) 14.0 - 18.0 g/dL    Hematocrit 32.6 (L) 42.0 - 52.0 %    MCV 90.3 81.4 - 97.8 fL    MCH 29.6 27.0 - 33.0 pg    MCHC 32.8 (L) 33.7 - 35.3 g/dL    RDW 42.4 35.9 - 50.0 fL    Platelet Count 260 164 - 446 K/uL    MPV 10.0 9.0 - 12.9 fL    Neutrophils-Polys 73.90 (H) 44.00 - 72.00 %    Lymphocytes 16.00 (L) 22.00 - 41.00 %    Monocytes 7.60 0.00 - 13.40 %    Eosinophils 1.60 0.00 - 6.90 %    Basophils 0.30 0.00 - 1.80 %    Immature Granulocytes 0.60 0.00 - 0.90 %    Nucleated RBC 0.00 /100 WBC    Neutrophils (Absolute) 6.56 1.82 - 7.42 K/uL    Lymphs (Absolute) 1.42 1.00 - 4.80 K/uL    Monos (Absolute) 0.67 0.00 - 0.85 K/uL    Eos (Absolute) 0.14 0.00 - 0.51 K/uL    Baso (Absolute) 0.03 0.00 - 0.12 K/uL    Immature Granulocytes (abs) 0.05 0.00 - 0.11 K/uL    NRBC (Absolute) 0.00 K/uL   Basic Metabolic Panel (BMP): Tomorrow AM    Collection Time: 10/20/22  3:39 AM   Result Value Ref Range    Sodium 136 135 - 145 mmol/L    Potassium 3.9 3.6 - 5.5 mmol/L    Chloride 104 96 - 112 mmol/L    Co2 25 20 - 33 mmol/L    Glucose 113 (H) 65 - 99 mg/dL    Bun 12 8 - 22 mg/dL    Creatinine 0.42 (L) 0.50 - 1.40 mg/dL    Calcium 8.0 (L) 8.5 - 10.5 mg/dL    Anion Gap 7.0 7.0 - 16.0   Magnesium: Every Monday and Thursday AM    Collection Time: 10/20/22  3:39 AM   Result Value Ref Range    Magnesium 1.9 1.5 - 2.5 mg/dL   Phosphorus: Every Monday and Thursday AM    Collection Time: 10/20/22  3:39 AM   Result Value Ref Range    Phosphorus 2.1 (L) 2.5 - 4.5 mg/dL   ESTIMATED GFR    Collection Time: 10/20/22  3:39 AM   Result Value Ref Range    GFR (CKD-EPI) 121 >60 mL/min/1.73 m 2       Fluids    Intake/Output Summary (Last 24 hours) at 10/20/2022 1209  Last data filed at 10/20/2022 1138  Gross per 24 hour   Intake 540 ml   Output 600 ml   Net -60 ml       Core Measures & Quality  Metrics  Labs reviewed, Medications reviewed and Radiology images reviewed  Barajas catheter: No Barajas      DVT Prophylaxis: Enoxaparin (Lovenox)  DVT prophylaxis - mechanical: SCDs  Ulcer prophylaxis: Not indicated    Assessed for rehab: Patient was assess for and/or received rehabilitation services during this hospitalization  RAP Score Total: 5  CAGE Results: negative Blood Alcohol>0.08: no     Assessment/Plan  Multiple facial fractures, closed, initial encounter ([facility])- (present on admission)  Assessment & Plan  Fractures of the anterior, medial and superior maxillary sinuses bilaterally, fracture of the right lateral maxillary sinus, bilateral nasal fractures.  Comminuted frontal sinus fracture which appears to involve superior orbital walls bilaterally.    Comminuted displaced fracture of the right superior medial orbit, no retro-orbital hematoma.  Bilateral pterygoid plate fractures.  LeForte III type fracture  Keep HOB elevated  10/18 ORIF bilateral [place] fractures through multiple surgical approaches including interdental   fixation.  Huey Ahuja MD. Plastic Surgeon. [facility].    Discharge planning issues- (present on admission)  Assessment & Plan  Date of admission: 10/13/2022.  10/20 Case management assisting with placement in the [place] area.  Cleared for discharge: No.  Discharge delayed: No.  Discharge date: tbd.    Traumatic closed displaced fracture of distal end of radius and ulna, left, initial encounter- (present on admission)  Assessment & Plan  Comminuted intra-articular distal radius fracture on left.    Significant displacement of large radial sided fracture fragment.  Comminuted fracture of distal ulna.  Splinted at referring facility.  Reduced in ED with conscious sedation  CT bilateral wrists completed  10/17 ORIF distal radius.  Weight bearing status - Platform weightbearing LUE. ADL's ok with hands under 5 lbs  Te Comer MD. Orthopedic Surgeon. [facility]. and Deyvi Ocampo MD. Orthopedic Surgeon. [facility].      Closed fracture distal radius and ulna, right, initial encounter- (present on admission)  Assessment & Plan  Comminuted dorsally angulated intra-articular distal radius fracture with impaction.    Mildly distracted fracture of the base of the ulnar styloid.  Splinted at referring facility  Reduced in ED with conscious sedation  CT bilateral wrists completed  10/18 ORIF distal radius.  Weight bearing status - Platform weightbearing RUE. ADL's ok with hands under 5 lbs  Te Comer MD. Orthopedic Surgeon. [facility].      Closed fracture of third cervical vertebra, initial encounter ([facility])- (present on admission)  Assessment & Plan  Fracture of the anterior inferior corner of the C3 vertebral body with slight anterior displacement approximately 3 mm.  Equivocal nondisplaced fracture line extending to superior endplate of the vertebral body.  Left C3-4 facet mildly widened with posterior displacement of C3 facet in relation to C4  CTA neck within normal limits  Non-operative management.   Cervical immobilization.   Recommend rigid cervical orthosis. Follow up in clinic 6 weeks with AP / lateral cervical spine xrays   Garrett Edgar MD, PhD. Neurosurgeon. [facility].        Lip laceration- (present on admission)  Assessment & Plan  Repaired in ED with absorbable suture     No contraindication to deep vein thrombosis (DVT) prophylaxis- (present on admission)  Assessment & Plan  Prophylactic anticoagulation for thrombotic prevention initially contraindicated secondary to elevated bleeding risk.  10/14 Prophylactic dose enoxaparin initiated.       Finger dislocation, initial encounter- (present on admission)  Assessment & Plan  Dorsal dislocation of the PIP joint of the long finger without associated fracture.  Reduced at referring facility.     Trauma- (present on admission)  Assessment & Plan  Fall while  [facility] Transfer Activation.  Marck Marquez MD. Trauma Surgery.       Discussed patient condition with Family, RN, Patient, and trauma surgery, Dr. Marquez.

## 2022-10-20 NOTE — DISCHARGE PLANNING
Received Choice form at 1030  Agency/Facility Name: [facility]  Referral sent per Choice form @ 1258  (DPA sent via Communication Management)

## 2022-10-20 NOTE — PROGRESS NOTES
Pt received from PACU  Pt is A&O 4  Pain declines   declines nausea  Tolerating a Clears   BUE splints/ casts in place  Cold saline compress in place changed PRN  Voids+  + flatus  PTA BM  SCD's on LLE PIV on RLE w/ order  Bed alarm on, pt mod fall risk per deric kc  Reviewed plan of care with patient, bed in lowest position and locked, pt resting comfortably now, call light within reach, all needs met at this time. Interventions will be executed per plan of care

## 2022-10-20 NOTE — DISCHARGE PLANNING
Care Transition Team Assessment    LSW met with pt at bedside to complete assessment. Pt A&Ox4 and able to verify the information on the face sheet. Pt lives alone in a second floor apartment. Prior to this hospitalization pt was independent with all ADLs and IADLs. Pt does not use any DME at baseline. Pt reported he uses public transportation or walks as he does not drive.    Pt denies having any local support as both his siblings live out of state. Pt reported he does have a friend, a 76 yo woman, who lives close to him and can offer very limited assistance. Pt is currently unemployed and reported he has been taking money from his [employer] account while he works on applying for social security. Pt does not have an advance directive. LSW provided AD packet to pt at bedside.    LSW spoke with pt about SNF choice. Pt's brother, Chuy, also at bedside and reported their sister, Wilma, has been looking into facilities contracted with pt's insurance. Pt requested this LSW f/u with his sister for SNF choice.    LSW made phone call to Wilma. Wilma reported so far she has found [facility], [facility], [facility] [place], [facility], and [facility] [place]. Wilma provided this LSW with insurance contact number [phone].    LSW made phone call to [phone] and left  for insurance inpatient  Care Transition line regarding additional contracted SNFs. Choice form faxed to [employer].    Information Source  Orientation Level: Oriented X4  Information Given By: Patient  Informant's Name: Leobardoadrian WileyYoung  Who is responsible for making decisions for patient? : Patient    Readmission Evaluation  Is this a readmission?: No    Elopement Risk  Legal Hold: No  Ambulatory or Self Mobile in Wheelchair: Yes  Disoriented: No  Psychiatric Symptoms: None  History of Wandering: No  Elopement this Admit: No  Vocalizing Wanting to Leave: No  Displays  Behaviors, Body Language Wanting to Leave: No-Not at Risk for Elopement  Elopement Risk: Not at Risk for Elopement    Interdisciplinary Discharge Planning  Lives with - Patient's Self Care Capacity: Alone and Able to Care For Self  Patient or legal guardian wants to designate a caregiver: No  Support Systems: Family Member(s), Friends / Neighbors  Housing / Facility: 1 Story Apartment / Condo  Prior Services: None  Durable Medical Equipment: Not Applicable    Discharge Preparedness  What is your plan after discharge?: Skilled nursing facility  Prior Functional Level: Ambulatory, Independent with Activities of Daily Living, Independent with Medication Management  Difficulity with ADLs: None  Difficulity with IADLs: Driving    Functional Assesment  Prior Functional Level: Ambulatory, Independent with Activities of Daily Living, Independent with Medication Management    Finances  Financial Barriers to Discharge: No  Prescription Coverage: Yes    Vision / Hearing Impairment  Right Eye Vision: Other (Comments) (HERIBERTO dressing in place)  Left Eye Vision: Other (Comments) (HERIBERTO dressing in place)    Advance Directive  Advance Directive?: None  Advance Directive offered?: AD Booklet given    Domestic Abuse  Have you ever been the victim of abuse or violence?: No  Physical Abuse or Sexual Abuse: No  Verbal Abuse or Emotional Abuse: No  Possible Abuse/Neglect Reported to:: Not Applicable    Psychological Assessment  History of Substance Abuse: None  History of Psychiatric Problems: No  Non-compliant with Treatment: No  Newly Diagnosed Illness: No    Discharge Risks or Barriers  Discharge risks or barriers?: Uninsured / underinsured, Transportation, Lives alone, no community support  Patient risk factors: Lack of outside supports, Uninsured or underinsured, Lives alone and no community support    Anticipated Discharge Information  Discharge Disposition: D/T to SNF with Medicare cert in anticipation of skilled care (03)

## 2022-10-20 NOTE — ANESTHESIA POSTPROCEDURE EVALUATION
Patient: Leobardo Young    Procedure Summary     Date: 10/18/22 Room / Location: [place] [address] / SURGERY [facility]    Anesthesia Start: 1716 Anesthesia Stop: 1947    Procedure: OPEN TREATMENT OF FACIAL FRACTURE (Bilateral: Face) Diagnosis: (bilateral lefort fracture)    Surgeons: Huey Ahuja M.D. Responsible Provider: Jaspal Mixon M.D.    Anesthesia Type: general ASA Status: 2          Final Anesthesia Type: general  Last vitals  BP   Blood Pressure: (!) 144/72    Temp   36.7 °C (98 °F)    Pulse   78   Resp   18    SpO2   95 %      Anesthesia Post Evaluation    Patient location during evaluation: PACU  Patient participation: complete - patient participated  Level of consciousness: awake and alert    Airway patency: patent  Anesthetic complications: no  Cardiovascular status: hemodynamically stable  Respiratory status: acceptable  Hydration status: euvolemic    PONV: none          No notable events documented.     Nurse Pain Score: 5 (NPRS)

## 2022-10-21 LAB
ANION GAP SERPL CALC-SCNC: 8 MMOL/L (ref 7–16)
BASOPHILS # BLD AUTO: 0.3 % (ref 0–1.8)
BASOPHILS # BLD: 0.03 K/UL (ref 0–0.12)
BUN SERPL-MCNC: 9 MG/DL (ref 8–22)
CALCIUM SERPL-MCNC: 8.4 MG/DL (ref 8.5–10.5)
CHLORIDE SERPL-SCNC: 104 MMOL/L (ref 96–112)
CO2 SERPL-SCNC: 25 MMOL/L (ref 20–33)
CREAT SERPL-MCNC: 0.48 MG/DL (ref 0.5–1.4)
EOSINOPHIL # BLD AUTO: 0.2 K/UL (ref 0–0.51)
EOSINOPHIL NFR BLD: 2.3 % (ref 0–6.9)
ERYTHROCYTE [DISTWIDTH] IN BLOOD BY AUTOMATED COUNT: 41 FL (ref 35.9–50)
GFR SERPLBLD CREATININE-BSD FMLA CKD-EPI: 116 ML/MIN/1.73 M 2
GLUCOSE SERPL-MCNC: 111 MG/DL (ref 65–99)
HCT VFR BLD AUTO: 32.8 % (ref 42–52)
HGB BLD-MCNC: 11 G/DL (ref 14–18)
IMM GRANULOCYTES # BLD AUTO: 0.06 K/UL (ref 0–0.11)
IMM GRANULOCYTES NFR BLD AUTO: 0.7 % (ref 0–0.9)
LYMPHOCYTES # BLD AUTO: 1.49 K/UL (ref 1–4.8)
LYMPHOCYTES NFR BLD: 16.9 % (ref 22–41)
MCH RBC QN AUTO: 29.8 PG (ref 27–33)
MCHC RBC AUTO-ENTMCNC: 33.5 G/DL (ref 33.7–35.3)
MCV RBC AUTO: 88.9 FL (ref 81.4–97.8)
MONOCYTES # BLD AUTO: 0.77 K/UL (ref 0–0.85)
MONOCYTES NFR BLD AUTO: 8.7 % (ref 0–13.4)
NEUTROPHILS # BLD AUTO: 6.29 K/UL (ref 1.82–7.42)
NEUTROPHILS NFR BLD: 71.1 % (ref 44–72)
NRBC # BLD AUTO: 0 K/UL
NRBC BLD-RTO: 0 /100 WBC
PLATELET # BLD AUTO: 291 K/UL (ref 164–446)
PMV BLD AUTO: 9.3 FL (ref 9–12.9)
POTASSIUM SERPL-SCNC: 4.3 MMOL/L (ref 3.6–5.5)
RBC # BLD AUTO: 3.69 M/UL (ref 4.7–6.1)
SODIUM SERPL-SCNC: 137 MMOL/L (ref 135–145)
WBC # BLD AUTO: 8.8 K/UL (ref 4.8–10.8)

## 2022-10-21 PROCEDURE — 99232 SBSQ HOSP IP/OBS MODERATE 35: CPT

## 2022-10-21 PROCEDURE — 97116 GAIT TRAINING THERAPY: CPT

## 2022-10-21 PROCEDURE — 97535 SELF CARE MNGMENT TRAINING: CPT

## 2022-10-21 PROCEDURE — 85025 COMPLETE CBC W/AUTO DIFF WBC: CPT

## 2022-10-21 PROCEDURE — A9270 NON-COVERED ITEM OR SERVICE: HCPCS

## 2022-10-21 PROCEDURE — A9270 NON-COVERED ITEM OR SERVICE: HCPCS | Performed by: NURSE PRACTITIONER

## 2022-10-21 PROCEDURE — 700102 HCHG RX REV CODE 250 W/ 637 OVERRIDE(OP): Performed by: NURSE PRACTITIONER

## 2022-10-21 PROCEDURE — 80048 BASIC METABOLIC PNL TOTAL CA: CPT

## 2022-10-21 PROCEDURE — 97530 THERAPEUTIC ACTIVITIES: CPT

## 2022-10-21 PROCEDURE — 700102 HCHG RX REV CODE 250 W/ 637 OVERRIDE(OP): Performed by: SPECIALIST

## 2022-10-21 PROCEDURE — 700102 HCHG RX REV CODE 250 W/ 637 OVERRIDE(OP)

## 2022-10-21 PROCEDURE — A9270 NON-COVERED ITEM OR SERVICE: HCPCS | Performed by: PLASTIC SURGERY

## 2022-10-21 PROCEDURE — 700102 HCHG RX REV CODE 250 W/ 637 OVERRIDE(OP): Performed by: ORTHOPAEDIC SURGERY

## 2022-10-21 PROCEDURE — A9270 NON-COVERED ITEM OR SERVICE: HCPCS | Performed by: SPECIALIST

## 2022-10-21 PROCEDURE — 700111 HCHG RX REV CODE 636 W/ 250 OVERRIDE (IP): Performed by: SURGERY

## 2022-10-21 PROCEDURE — 302196 LINEN, HYPOALLERGENIC: Performed by: SURGERY

## 2022-10-21 PROCEDURE — 770001 HCHG ROOM/CARE - MED/SURG/GYN PRIV*

## 2022-10-21 PROCEDURE — 700102 HCHG RX REV CODE 250 W/ 637 OVERRIDE(OP): Performed by: PLASTIC SURGERY

## 2022-10-21 PROCEDURE — A9270 NON-COVERED ITEM OR SERVICE: HCPCS | Performed by: ORTHOPAEDIC SURGERY

## 2022-10-21 PROCEDURE — 36415 COLL VENOUS BLD VENIPUNCTURE: CPT

## 2022-10-21 RX ADMIN — CELECOXIB 200 MG: 200 CAPSULE ORAL at 16:29

## 2022-10-21 RX ADMIN — OXYCODONE 5 MG: 5 TABLET ORAL at 14:30

## 2022-10-21 RX ADMIN — METAXALONE 400 MG: 800 TABLET ORAL at 06:22

## 2022-10-21 RX ADMIN — GABAPENTIN 100 MG: 100 CAPSULE ORAL at 06:22

## 2022-10-21 RX ADMIN — POLYETHYLENE GLYCOL 3350 1 PACKET: 17 POWDER, FOR SOLUTION ORAL at 06:23

## 2022-10-21 RX ADMIN — METAXALONE 400 MG: 800 TABLET ORAL at 14:05

## 2022-10-21 RX ADMIN — MAGNESIUM HYDROXIDE 30 ML: 400 SUSPENSION ORAL at 06:26

## 2022-10-21 RX ADMIN — DOCUSATE SODIUM 100 MG: 100 CAPSULE, LIQUID FILLED ORAL at 06:22

## 2022-10-21 RX ADMIN — OXYCODONE 5 MG: 5 TABLET ORAL at 06:22

## 2022-10-21 RX ADMIN — CELECOXIB 200 MG: 200 CAPSULE ORAL at 06:21

## 2022-10-21 RX ADMIN — TAMSULOSIN HYDROCHLORIDE 0.4 MG: 0.4 CAPSULE ORAL at 08:21

## 2022-10-21 RX ADMIN — 0.12% CHLORHEXIDINE GLUCONATE 15 ML: 1.2 RINSE ORAL at 06:22

## 2022-10-21 RX ADMIN — POTASSIUM CHLORIDE 40 MEQ: 20 TABLET, EXTENDED RELEASE ORAL at 16:29

## 2022-10-21 RX ADMIN — METAXALONE 400 MG: 800 TABLET ORAL at 16:29

## 2022-10-21 RX ADMIN — ACETAMINOPHEN 650 MG: 325 TABLET, FILM COATED ORAL at 01:23

## 2022-10-21 RX ADMIN — GABAPENTIN 100 MG: 100 CAPSULE ORAL at 22:41

## 2022-10-21 RX ADMIN — GABAPENTIN 100 MG: 100 CAPSULE ORAL at 14:05

## 2022-10-21 RX ADMIN — ENOXAPARIN SODIUM 30 MG: 30 INJECTION SUBCUTANEOUS at 16:30

## 2022-10-21 RX ADMIN — ENOXAPARIN SODIUM 30 MG: 30 INJECTION SUBCUTANEOUS at 06:22

## 2022-10-21 RX ADMIN — OXYCODONE 5 MG: 5 TABLET ORAL at 20:10

## 2022-10-21 RX ADMIN — POTASSIUM CHLORIDE 40 MEQ: 20 TABLET, EXTENDED RELEASE ORAL at 06:21

## 2022-10-21 RX ADMIN — OXYCODONE 5 MG: 5 TABLET ORAL at 16:30

## 2022-10-21 RX ADMIN — 0.12% CHLORHEXIDINE GLUCONATE 15 ML: 1.2 RINSE ORAL at 20:13

## 2022-10-21 RX ADMIN — OXYCODONE 5 MG: 5 TABLET ORAL at 02:42

## 2022-10-21 ASSESSMENT — ENCOUNTER SYMPTOMS
BACK PAIN: 0
CARDIOVASCULAR NEGATIVE: 1
PSYCHIATRIC NEGATIVE: 1
SHORTNESS OF BREATH: 0
FEVER: 0
DIZZINESS: 0
EYE PAIN: 1
ROS GI COMMENTS: LAST BM 10/21
NECK PAIN: 1
VOMITING: 0
TINGLING: 0
ABDOMINAL PAIN: 0
SENSORY CHANGE: 0
HEADACHES: 0
NAUSEA: 0
MYALGIAS: 1
CHILLS: 0

## 2022-10-21 ASSESSMENT — COGNITIVE AND FUNCTIONAL STATUS - GENERAL
PERSONAL GROOMING: A LITTLE
SUGGESTED CMS G CODE MODIFIER DAILY ACTIVITY: CL
STANDING UP FROM CHAIR USING ARMS: A LITTLE
TURNING FROM BACK TO SIDE WHILE IN FLAT BAD: A LITTLE
EATING MEALS: A LOT
SUGGESTED CMS G CODE MODIFIER MOBILITY: CK
MOVING TO AND FROM BED TO CHAIR: A LITTLE
DRESSING REGULAR LOWER BODY CLOTHING: TOTAL
MOBILITY SCORE: 18
WALKING IN HOSPITAL ROOM: A LITTLE
TOILETING: A LOT
DAILY ACTIVITIY SCORE: 12
HELP NEEDED FOR BATHING: A LOT
DRESSING REGULAR UPPER BODY CLOTHING: A LOT
CLIMB 3 TO 5 STEPS WITH RAILING: A LITTLE
MOVING FROM LYING ON BACK TO SITTING ON SIDE OF FLAT BED: A LITTLE

## 2022-10-21 ASSESSMENT — PAIN DESCRIPTION - PAIN TYPE
TYPE: ACUTE PAIN
TYPE: ACUTE PAIN;SURGICAL PAIN
TYPE: ACUTE PAIN
TYPE: ACUTE PAIN;SURGICAL PAIN
TYPE: ACUTE PAIN
TYPE: ACUTE PAIN;SURGICAL PAIN

## 2022-10-21 ASSESSMENT — GAIT ASSESSMENTS
DEVIATION: SHUFFLED GAIT;BRADYKINETIC
GAIT LEVEL OF ASSIST: STANDBY ASSIST
DISTANCE (FEET): 100

## 2022-10-21 NOTE — DISCHARGE PLANNING
Agency/Facility Name: [facility]  Outcome: DPA attempted to call SNF 4 times but each time phone was hung up and answered immediately. DPA to follow up at a later time.     1025:  Agency/Facility Name: [facility]  Outcome: DPA left a voicemail for admissions regarding if referral status and if referral was received. DPA requesting a call back.     1026:  Agency/Facility Name: [facility] [place]  Outcome: DPA left a voicemail for Layne Guzmán in admissions regarding referral status and if referral was received. DPA requesting a call back.     1028:  Agency/Facility Name: [facility]  Outcome: DPA left a voicemail for Zuleyma in admissions regarding referral status and if referral was received. DPA requesting a call back.    Zuleyma: [phone]  Mel: [phone]     1030:  Agency/Facility Name: [facility]  Spoke To: Moses  Outcome: DPA was notified Pt has been declined due to needing both medicare and medi-al insurance in order to be accepted.    KALINA notified.     1038:  Agency/Facility Name: [facility]  Spoke To: Eleanor  Outcome: DPA was notified [facility] needs to check to see if referral was received. DPA provided information. [facility] to follow up with this DPA.     [facility]: [phone]    1047:  Agency/Facility Name: [facility]  Spoke To: Ludivina  Outcome: DPA was notified Pt has been declined as he does not meet certain criteria for rehab. Ludivina suggests SNF might be better suited for Pt and to reach out to [facility] as they are contracted with Pt insurance and can provide resources on where to go.     Lisa @ [facility]: [phone]    KALINA notified.

## 2022-10-21 NOTE — PROGRESS NOTES
Trauma / Surgical Daily Progress Note    Date of Service  10/21/2022    Chief Complaint  62 y.o. male admitted 10/13/2022 with Trauma, hiking and fall, spinal fracture, facial fxs, bilateral wrists fxs     10/17 ORIF intraarticular distal radius fracture. ORIF left intraarticular distal radius fracture. ORIF ulnar neck fracture.     10/18 ORIF bilateral Le Fort fractures through multiple surgical approaches including interdental fixation.    Interval Events  Doing well. Adequate pain control.  Reviewed therapy notes. They recommend post acute placement.  Rash continues. Denies itchiness.    - Benadryl ordered PRN.  - Aggressive pulmonary hygiene.  - Mobilization. In chair for all meals.  - Please order new collar pads.  - Case management assisting with placement in [place] area.    Review of Systems  Review of Systems   Constitutional:  Negative for chills and fever.   HENT: Negative.     Eyes:  Positive for pain.   Respiratory:  Negative for shortness of breath.    Cardiovascular: Negative.  Negative for chest pain.   Gastrointestinal:  Negative for abdominal pain, nausea and vomiting.        Last BM 10/21   Genitourinary:         Voiding   Musculoskeletal:  Positive for joint pain, myalgias and neck pain. Negative for back pain.   Skin:  Negative for itching.   Neurological:  Negative for dizziness, tingling, sensory change and headaches.   Endo/Heme/Allergies: Negative.    Psychiatric/Behavioral: Negative.     All other systems reviewed and are negative.     Vital Signs  Temp:  [36.2 °C (97.1 °F)-37 °C (98.6 °F)] 36.2 °C (97.1 °F)  Pulse:  [] 80  Resp:  [16-17] 16  BP: (137-158)/(70-76) 147/76  SpO2:  [93 %-96 %] 93 %    Physical Exam  Physical Exam  Vitals and nursing note reviewed.   Constitutional:       General: He is awake. He is not in acute distress.     Appearance: He is not toxic-appearing.      Interventions: Cervical collar and face mask in place.   HENT:      Head: Normocephalic.      Comments:  Significant facial trauma.  Lip laceration approximated.   Under eyes sutured  Ecchymosis behind ears       Nose: Congestion present.      Mouth/Throat:      Mouth: Mucous membranes are dry.      Pharynx: Oropharynx is clear.   Eyes:      Conjunctiva/sclera:      Right eye: Right conjunctiva is injected.      Left eye: Left conjunctiva is injected.      Comments: Bilateral periorbital ecchymosis.    Neck:      Comments: Ccollar in place  Cardiovascular:      Rate and Rhythm: Normal rate and regular rhythm.      Pulses: Normal pulses.   Pulmonary:      Effort: Pulmonary effort is normal. No respiratory distress.      Breath sounds: Examination of the right-middle field reveals decreased breath sounds. Examination of the left-middle field reveals decreased breath sounds. Examination of the right-lower field reveals decreased breath sounds. Examination of the left-lower field reveals decreased breath sounds. Decreased breath sounds present.   Abdominal:      General: Bowel sounds are normal. There is no distension.      Palpations: Abdomen is soft.      Tenderness: There is no abdominal tenderness.   Genitourinary:     Comments: Condom cath  Musculoskeletal:         General: Tenderness and signs of injury present.      Cervical back: Tenderness present.      Comments: Splint to bilateral upper extremities, +CMS   Skin:     General: Skin is warm and dry.      Capillary Refill: Capillary refill takes less than 2 seconds.      Findings: Bruising and rash (back) present.      Comments: Scattered abrasions   Neurological:      Mental Status: He is alert and oriented to person, place, and time.   Psychiatric:         Mood and Affect: Mood normal.         Behavior: Behavior normal. Behavior is cooperative.       Laboratory  Recent Results (from the past 24 hour(s))   Basic Metabolic Panel (BMP): Tomorrow AM    Collection Time: 10/21/22  4:18 AM   Result Value Ref Range    Sodium 137 135 - 145 mmol/L    Potassium 4.3 3.6 - 5.5  mmol/L    Chloride 104 96 - 112 mmol/L    Co2 25 20 - 33 mmol/L    Glucose 111 (H) 65 - 99 mg/dL    Bun 9 8 - 22 mg/dL    Creatinine 0.48 (L) 0.50 - 1.40 mg/dL    Calcium 8.4 (L) 8.5 - 10.5 mg/dL    Anion Gap 8.0 7.0 - 16.0   CBC with Differential: Tomorrow AM    Collection Time: 10/21/22  4:18 AM   Result Value Ref Range    WBC 8.8 4.8 - 10.8 K/uL    RBC 3.69 (L) 4.70 - 6.10 M/uL    Hemoglobin 11.0 (L) 14.0 - 18.0 g/dL    Hematocrit 32.8 (L) 42.0 - 52.0 %    MCV 88.9 81.4 - 97.8 fL    MCH 29.8 27.0 - 33.0 pg    MCHC 33.5 (L) 33.7 - 35.3 g/dL    RDW 41.0 35.9 - 50.0 fL    Platelet Count 291 164 - 446 K/uL    MPV 9.3 9.0 - 12.9 fL    Neutrophils-Polys 71.10 44.00 - 72.00 %    Lymphocytes 16.90 (L) 22.00 - 41.00 %    Monocytes 8.70 0.00 - 13.40 %    Eosinophils 2.30 0.00 - 6.90 %    Basophils 0.30 0.00 - 1.80 %    Immature Granulocytes 0.70 0.00 - 0.90 %    Nucleated RBC 0.00 /100 WBC    Neutrophils (Absolute) 6.29 1.82 - 7.42 K/uL    Lymphs (Absolute) 1.49 1.00 - 4.80 K/uL    Monos (Absolute) 0.77 0.00 - 0.85 K/uL    Eos (Absolute) 0.20 0.00 - 0.51 K/uL    Baso (Absolute) 0.03 0.00 - 0.12 K/uL    Immature Granulocytes (abs) 0.06 0.00 - 0.11 K/uL    NRBC (Absolute) 0.00 K/uL   ESTIMATED GFR    Collection Time: 10/21/22  4:18 AM   Result Value Ref Range    GFR (CKD-EPI) 116 >60 mL/min/1.73 m 2       Fluids    Intake/Output Summary (Last 24 hours) at 10/21/2022 1434  Last data filed at 10/21/2022 1200  Gross per 24 hour   Intake 1080 ml   Output 8455 ml   Net -7375 ml       Core Measures & Quality Metrics  Labs reviewed, Medications reviewed and Radiology images reviewed  Barajas catheter: No Barajas      DVT Prophylaxis: Enoxaparin (Lovenox)  DVT prophylaxis - mechanical: SCDs  Ulcer prophylaxis: Not indicated    Assessed for rehab: Patient was assess for and/or received rehabilitation services during this hospitalization  RAP Score Total: 5  CAGE Results: negative Blood Alcohol>0.08: no     Assessment/Plan  Multiple  facial fractures, closed, initial encounter (HCC)- (present on admission)  Assessment & Plan  Fractures of the anterior, medial and superior maxillary sinuses bilaterally, fracture of the right lateral maxillary sinus, bilateral nasal fractures.  Comminuted frontal sinus fracture which appears to involve superior orbital walls bilaterally.    Comminuted displaced fracture of the right superior medial orbit, no retro-orbital hematoma.  Bilateral pterygoid plate fractures.  LeForte III type fracture  Keep HOB elevated  10/18 ORIF bilateral Le [place] fractures through multiple surgical approaches including interdental   fixation.  Huey Ahuja MD. Plastic Surgeon. [facility].    Discharge planning issues- (present on admission)  Assessment & Plan  Date of admission: 10/13/2022.  10/20 Case management [place].  Cleared for discharge: No.  Discharge delayed: No.  Discharge date: tbd.    Traumatic closed displaced fracture of distal end of radius and ulna, left, initial encounter- (present on admission)  Assessment & Plan  Comminuted intra-articular distal radius fracture on left.    Significant displacement of large radial sided fracture fragment.  Comminuted fracture of distal ulna.  Splinted at referring facility.  Reduced in ED with conscious sedation  CT bilateral wrists completed  10/17 ORIF distal radius.  Weight bearing status - Platform weightbearing LUE. ADL's ok with hands under 5 lbs  Te Comer MD. Orthopedic Surgeon. [facility]. and Deyvi Ocampo MD. Orthopedic Surgeon. [facility].      Closed fracture distal radius and ulna, right, initial encounter- (present on admission)  Assessment & Plan  Comminuted dorsally angulated intra-articular distal radius fracture with impaction.    Mildly distracted fracture of the base of the ulnar styloid.  Splinted at referring facility  Reduced in ED with conscious sedation  CT bilateral wrists  completed  10/18 ORIF distal radius.  Weight bearing status - Platform weightbearing RUE. ADL's ok with hands under 5 lbs  Te Comer MD. Orthopedic Surgeon. [facility].      Closed fracture of third cervical vertebra, initial encounter (HCC)- (present on admission)  Assessment & Plan  Fracture of the anterior inferior corner of the C3 vertebral body with slight anterior displacement approximately 3 mm.  Equivocal nondisplaced fracture line extending to superior endplate of the vertebral body.  Left C3-4 facet mildly widened with posterior displacement of C3 facet in relation to C4  CTA neck within normal limits  Non-operative management.   Cervical immobilization.   Recommend rigid cervical orthosis. Follow up in clinic 6 weeks with AP / lateral cervical spine xrays   Garrett Edgar MD, PhD. Neurosurgeon. [facility].        Lip laceration- (present on admission)  Assessment & Plan  Repaired in ED with absorbable suture     No contraindication to deep vein thrombosis (DVT) prophylaxis- (present on admission)  Assessment & Plan  Prophylactic anticoagulation for thrombotic prevention initially contraindicated secondary to elevated bleeding risk.  10/14 Prophylactic dose enoxaparin initiated.       Finger dislocation, initial encounter- (present on admission)  Assessment & Plan  Dorsal dislocation of the PIP joint of the long finger without associated fracture.  Reduced at referring facility.     Trauma- (present on admission)  Assessment & Plan  Fall while hiking  Trauma Green Transfer Activation.  Marck Marquez MD. Trauma Surgery.       Discussed patient condition with RN, Patient, and trauma surgery, Dr. Marquez.

## 2022-10-21 NOTE — CARE PLAN
The patient is Stable - Low risk of patient condition declining or worsening    Shift Goals  Clinical Goals: pain control  Patient Goals: ambulate; rest  Family Goals: increase mobility and self care    Progress made toward(s) clinical / shift goals:      Report received from NOC RN.  Assessment complete.  A&O x 4. Patient  calls appropriately.  Patient up with SB assist. NWB to BUE, cast CDI.  C-collar in place at all times.  Patient has 2/10 pain.   Patient denies SOB.  Denies N&V. Tolerating diet.  + void via condom cath, 10/21 BM.  Review plan with of care with patient.   Call light and personal belongings with in reach.   Hourly rounding in place.   All needs met at this time.     Patient is not progressing towards the following goals:

## 2022-10-21 NOTE — DISCHARGE PLANNING
Case Management Discharge Planning          Anticipated Discharge Dispo: Discharge Disposition: D/T to SNF with Medicare cert in anticipation of skilled care (03)    DME Needed: No    Action(s) Taken: LSW completed chart review. Per review, SNF referrals sent to SNFs in [place], [place]. Discussed pt with Trauma JULIETA Fernandez. Per Rebecca, pt is medically cleared. LSW requested DPA follow up with [facility].     1045: Per DPA, followed up with [facility] SNFs. Per DPA, she was provided with contact for Lisa at [facility] ([phone]) for assistance with resources on which SNFs to send referrals to.    1215: LSW called Lisa at [phone] to discuss possible SNF placements for pt. No answer, LSW left VM with call back number, did not disclose any pt PHI.    1230: LSW spoke with [facility]'s [facility]. LSW updated family that no facility has accepted at this time. LSW informed family we have reached out to CM at [facility] for assistance. Family states they cannot take pt as they all live in different states. Family appreciative of update.     Escalations Completed: None    Medically Clear: Yes    Next Steps: LSW to follow and assist as needed with transfer to [facility].    Barriers to Discharge: Pending Placement    Is the patient up for discharge tomorrow: No

## 2022-10-21 NOTE — THERAPY
"Occupational Therapy  Daily Treatment     Patient Name: Leobardo Young  Age:  62 y.o., Sex:  male  Medical Record #: [id number]  Today's Date: 10/21/2022     Precautions  Precautions: Fall Risk, Platform Weight Bearing Left Upper Extremity, Platform Weight Bearing Right Upper Extremity, Spinal / Back Precautions , Cervical Collar    Comments: c-collar AAT, facial fxs, BUE casts, ADL's up to 5 lbs    Assessment    Pt seen for OT tx. Pt performed toothbrushing w/ min A. Attempted to teach compensatory strategies standing at sink, but due to pt height and cervical precautions, pt unable to see hands. Transitioned to seated position and functional performance improved. Pt educated on compensatory strategies for toothbrushing and applying chap stick. Pt pinch strength and overall dexterity improved from initial eval; however, pt lacks the strength to open toothpaste and chap stick independently. Pt desired goals discussed and pt stated he has a strong desire to return to his prior level of independence w/ ADLs and return to outdoor recreation. Due to precautions, pain, and limited functional use of hands, pt is demonstrating difficulties completing BADLs. Pt lives alone with little social support indicating a need for post acute placement prior to return home. Pt will continue to benefit from skilled OT while admitted to acute care.     Plan    Continue current treatment plan.    DC Equipment Recommendations: Unable to determine at this time  Discharge Recommendations: Recommend post-acute placement for additional occupational therapy services prior to discharge home    Subjective    \"This is stressful. I wish I could do things like I did before.\"     Objective     10/21/22 5195   Pain   Pain Scales 0 to 10 Scale    Pain 0 - 10 Group   Therapist Pain Assessment Post Activity Pain Same as Prior to Activity;2  (Pt agreeable to session)   Non Verbal Descriptors   Non Verbal Scale  Calm;Unlabored Breathing   Cognition  "   Cognition / Consciousness WDL   Level of Consciousness Alert   Comments Pleasant and cooperative, very motivated   Strength Upper Body   Upper Body Strength  X   Right  Impaired   Left  Impaired   Gross Strength Generalized Weakness, Equal Bilaterally.    Comments Pinch strength R UE 3/4, L UE 3+/4   Sensation Upper Body   Upper Extremity Sensation  WDL   Upper Body Muscle Tone   Upper Body Muscle Tone  WDL   Other Treatments   Other Treatments Provided Encouraged AROM to joints outside of casts w/in precautions and discussed importance of elevation to prevent edema.   Balance   Sitting Balance (Static) Fair +   Sitting Balance (Dynamic) Fair   Standing Balance (Static) Fair -   Standing Balance (Dynamic) Fair -   Weight Shift Sitting Fair   Weight Shift Standing Fair   Skilled Intervention Verbal Cuing;Tactile Cuing;Facilitation;Sequencing   Comments no AD   Bed Mobility    Comments up to chair pre/post   Activities of Daily Living   Grooming Minimal Assist;Standing;Seated  (washed face and fingers outside of cast w/ supv, brushed teeth and applied chapstick w/ min A)   Skilled Intervention Facilitation;Verbal Cuing;Sequencing;Compensatory Strategies   Comments Trialed toothbrushing standing, but pt tall and unable to see hands due to cervical precautions, changed to seated position w/ improved functional capability   Functional Mobility   Sit to Stand Standby Assist   Bed, Chair, Wheelchair Transfer Standby Assist   Transfer Method Stand Step   Mobility chair>sink>chair   Skilled Intervention Verbal Cuing;Tactile Cuing;Facilitation   Comments no AD   Activity Tolerance   Sitting in Chair up to chair pre/post   Sitting Edge of Bed NT   Standing <10 min   Comments Pt reported fatigue at end of session   Patient / Family Goals   Patient / Family Goal #1 to be more independent   Short Term Goals   Short Term Goal # 1 Pt will don gown w/ supv   Goal Outcome # 1 Goal not met  (NT this session)   Short Term  Goal # 2 Pt will feed self w/ supv and compensatory strategies PRN  (goal changed to fit pt priorities)   Short Term Goal # 3 Pt will perform toileting w/ min A   Goal Outcome # 3 Goal not met  (NT this session)   Short Term Goal # 4 Pt will perfom g/h w/ supv and compensatory strategies PRN   Education Group   Education Provided Activities of Daily Living;Role of Occupational Therapist;Upper Extremity Range of Motion;Weight Bearing Precautions;Back Safety   Role of Occupational Therapist Patient Response Patient;Family;Acceptance;Explanation;Verbal Demonstration   Back Safety Patient Response Patient;Acceptance;Explanation;Demonstration;Verbal Demonstration;Action Demonstration;Reinforcement Needed   Upper Ext ROM Patient Response Patient;Acceptance;Explanation;Demonstration;Verbal Demonstration;Action Demonstration   ADL Patient Response Patient;Acceptance;Explanation;Demonstration;Verbal Demonstration;Action Demonstration   Weight Bearing Precautions Patient Response Patient;Family;Acceptance;Explanation;Demonstration;Verbal Demonstration;Action Demonstration

## 2022-10-21 NOTE — CARE PLAN
Problem: Pain - Standard  Goal: Alleviation of pain or a reduction in pain to the patient’s comfort goal  10/21/2022 0211 by Sia Barrios R.N.  Outcome: Progressing  10/21/2022 0209 by Sia Barrios R.N.  Outcome: Progressing     Problem: Fall Risk  Goal: Patient will remain free from falls  10/21/2022 0211 by Sia Barrios R.N.  Outcome: Progressing  10/21/2022 0209 by Sia Barrios R.N.  Outcome: Progressing   The patient is Stable - Low risk of patient condition declining or worsening    Shift Goals  Clinical Goals: pain control; ambulate  Patient Goals: ambulate; rest  Family Goals: increase mobility and self care    Progress made toward(s) clinical / shift goals:  yes

## 2022-10-21 NOTE — THERAPY
"Physical Therapy   Daily Treatment     Patient Name: Leobardo Young  Age:  62 y.o., Sex:  male  Medical Record #: [id number]  Today's Date: 10/21/2022     Precautions  Precautions: Fall Risk;Platform Weight Bearing Right Upper Extremity;Platform Weight Bearing Left Upper Extremity;Cervical Collar  ;Spinal / Back Precautions   Comments: c-collar AAT, facial fxs, BUE casts, ADL's up to 5 lbs    Assessment    Pt now s/p facial surgery on 10/19. Pt progressing as expected given injuries. Pt at high risk for falls and re-admission mainly 2/2 cervical and BUE PWB precautions limiting pt's functional mobility, balance, and safety. Pt with O2 needs, unable to carry O2 tank 2/2 precautions and needing assistance for line management, reports does not have anyone to provide that assistance at home. Continue to recommend placement at this time 2/2 deficits.     Performed Dynamic Gait Index: pt scoring 9/24 (score of <19/24 predictive for risk for falls). Pt unable to step over/around object without assistance as he is unable to see the item as he approaches, difficulty with change in gait speed and quick turns requiring increased time/effort to recover. Pt unable to pick object off ground, required ModA to stand from upright squat attempting to grab item while also maintaining spinal precautions. Pt with difficulty opening the door without assistance, donning mask, and other simple functional tasks pt will need to do independently.       Plan    Continue current treatment plan.    DC Equipment Recommendations: Unable to determine at this time  Discharge Recommendations: Recommend post-acute placement for additional physical therapy services prior to discharge home      Subjective    \"I'm going to fall into the bed and die is what's going to happen.\" Regarding d/c home      Objective       10/21/22 0933   Vitals   O2 (LPM) 1   O2 Delivery Device Nasal Cannula   Pain 0 - 10 Group   Therapist Pain Assessment Post Activity Pain " Same as Prior to Activity;Nurse Notified  (pain not rated, agreeable to mobility)   Cognition    Cognition / Consciousness WDL   Level of Consciousness Alert   Comments Pleasant and cooperative   Passive ROM Lower Body   Passive ROM Lower Body WDL   Active ROM Lower Body    Active ROM Lower Body  WDL   Strength Lower Body   Lower Body Strength  X   Comments grossly 4/5 BLE   Other Treatments   Other Treatments Provided Performed Dynamic Gait Index: pt scoring 9/24 (score of <19/24 predictive for risk for falls). Pt unable to step over/around object without assistance as he is unable to see the item as he approaches, difficulty with change in gait speed and quick turns requiring increased time/effort to recover. Pt unable to pick object off ground, required ModA to stand from upright squat attempting to grab item while also maintaining spinal precautions. Pt with difficulty opening the door without assistance, donning mask.   Balance   Sitting Balance (Static) Fair +   Sitting Balance (Dynamic) Fair   Standing Balance (Static) Fair -   Standing Balance (Dynamic) Fair -   Weight Shift Sitting Fair   Weight Shift Standing Fair   Skilled Intervention Verbal Cuing;Tactile Cuing;Sequencing;Compensatory Strategies   Comments no AD   Gait Analysis   Gait Level Of Assist Standby Assist   Assistive Device None   Distance (Feet) 100  (limited 2/2 fatigue post stair negotiation)   # of Times Distance was Traveled 1   Deviation Shuffled Gait;Bradykinetic  (slow arash, guarded posture)   # of Stairs Climbed 6   Level of Assist with Stairs Contact Guard Assist   Weight Bearing Status BUE PWB   Skilled Intervention Verbal Cuing;Tactile Cuing;Sequencing;Compensatory Strategies   Comments 1 mild LOB on stairs, able to self correct. Very slow, guarded posture. Limited stair negotiation 2/2 balance, weakness, and fatigue   Bed Mobility    Supine to Sit Supervised   Sit to Supine Supervised   Scooting Supervised   Rolling Supervised    Skilled Intervention Verbal Cuing;Tactile Cuing;Sequencing;Compensatory Strategies   Comments cues for log roll   Functional Mobility   Sit to Stand Standby Assist   Bed, Chair, Wheelchair Transfer Standby Assist   Transfer Method Stand Step   Mobility stairs, ambulation, balance assessment   Skilled Intervention Verbal Cuing;Tactile Cuing;Compensatory Strategies   How much difficulty does the patient currently have...   Turning over in bed (including adjusting bedclothes, sheets and blankets)? 3   Sitting down on and standing up from a chair with arms (e.g., wheelchair, bedside commode, etc.) 3   Moving from lying on back to sitting on the side of the bed? 3   How much help from another person does the patient currently need...   Moving to and from a bed to a chair (including a wheelchair)? 3   Need to walk in a hospital room? 3   Climbing 3-5 steps with a railing? 3   6 clicks Mobility Score 18   Activity Tolerance   Comments limited by fatigue   Short Term Goals    Short Term Goal # 1 Pt will perform supine <> sit with SPV and HOB flat in 6 visits to get in/out of bed without assistance   Goal Outcome # 1 Goal met   Short Term Goal # 2 Pt will perform STS transfers with SPV in 6 visits to improve functional independence   Goal Outcome # 2 Progressing as expected   Short Term Goal # 3 Pt will ambulate 250ft with SPV in 6 visits to access community distances   Goal Outcome # 3 Progressing as expected   Short Term Goal # 4 Pt will negotiate 12 steps with SPV in 6 visits to safely enter/exit his 2nd story apartment   Goal Outcome # 4 Progressing as expected   Education Group   Education Provided Stair Training;Cervical Precautions   Cervical Precautions Patient Response Patient;Acceptance;Explanation;Demonstration;Action Demonstration;Verbal Demonstration   Stair Training Patient Response Patient;Acceptance;Explanation;Action Demonstration;Reinforcement Needed   Anticipated Discharge Equipment and Recommendations    DC Equipment Recommendations Unable to determine at this time   Discharge Recommendations Recommend post-acute placement for additional physical therapy services prior to discharge home   Interdisciplinary Plan of Care Collaboration   IDT Collaboration with  Nursing;Occupational Therapist;Family / Caregiver   Patient Position at End of Therapy Seated;Call Light within Reach;Tray Table within Reach;Phone within Reach;Family / Friend in Room   Collaboration Comments RN updated   Session Information   Date / Session Number  10/21- 2 (2/3, 10/24)

## 2022-10-21 NOTE — CARE PLAN
Problem: Pain - Standard  Goal: Alleviation of pain or a reduction in pain to the patient’s comfort goal  Outcome: Progressing     Problem: Fall Risk  Goal: Patient will remain free from falls  Outcome: Progressing   The patient is Watcher - Medium risk of patient condition declining or worsening    Shift Goals  Clinical Goals: pain control; ambulate  Patient Goals: ambulate; rest  Family Goals: increase mobility and self care    Progress made toward(s) clinical / shift goals:  yes

## 2022-10-21 NOTE — PROGRESS NOTES
POD 2  No events  AVSS  Pt says his bite is just a bit off but much better than pre op  Evaluation show near normal to normal occlusion  Facial symmetry is good  Face is stable    A/P-pt doing well, hopefully occlusion will improve as swelling goes down.  No changes from my standpoint.  OK for DC whenever.  Pt will need follow up with in 2 weeks.

## 2022-10-21 NOTE — PROGRESS NOTES
Pt is AxOx4; on supplemental oxygen .  Verbalizes acute pain; declines need for pain medication at this time. PRN pain medications available per MAR.  Skin per flowsheets.   BUE splint/cast in place; platform WB. C-collar on at all times.  Denies SOB/chest pain/numbness or tingling.  +void, +flatus. LBM 10/19.  Regular diet; tolerating well without N/V.  Pt ambulates with standby assist.  SCDs on; lovenox in use.

## 2022-10-22 PROCEDURE — 700102 HCHG RX REV CODE 250 W/ 637 OVERRIDE(OP): Performed by: NURSE PRACTITIONER

## 2022-10-22 PROCEDURE — 700102 HCHG RX REV CODE 250 W/ 637 OVERRIDE(OP): Performed by: ORTHOPAEDIC SURGERY

## 2022-10-22 PROCEDURE — 99232 SBSQ HOSP IP/OBS MODERATE 35: CPT

## 2022-10-22 PROCEDURE — 700102 HCHG RX REV CODE 250 W/ 637 OVERRIDE(OP): Performed by: PLASTIC SURGERY

## 2022-10-22 PROCEDURE — A9270 NON-COVERED ITEM OR SERVICE: HCPCS | Performed by: ORTHOPAEDIC SURGERY

## 2022-10-22 PROCEDURE — 700111 HCHG RX REV CODE 636 W/ 250 OVERRIDE (IP): Performed by: ORTHOPAEDIC SURGERY

## 2022-10-22 PROCEDURE — 700111 HCHG RX REV CODE 636 W/ 250 OVERRIDE (IP): Performed by: SURGERY

## 2022-10-22 PROCEDURE — 770001 HCHG ROOM/CARE - MED/SURG/GYN PRIV*

## 2022-10-22 PROCEDURE — A9270 NON-COVERED ITEM OR SERVICE: HCPCS | Performed by: PLASTIC SURGERY

## 2022-10-22 PROCEDURE — 700102 HCHG RX REV CODE 250 W/ 637 OVERRIDE(OP): Performed by: SPECIALIST

## 2022-10-22 PROCEDURE — A9270 NON-COVERED ITEM OR SERVICE: HCPCS | Performed by: NURSE PRACTITIONER

## 2022-10-22 PROCEDURE — A9270 NON-COVERED ITEM OR SERVICE: HCPCS | Performed by: SPECIALIST

## 2022-10-22 PROCEDURE — A9270 NON-COVERED ITEM OR SERVICE: HCPCS

## 2022-10-22 PROCEDURE — 700102 HCHG RX REV CODE 250 W/ 637 OVERRIDE(OP)

## 2022-10-22 RX ORDER — CHOLECALCIFEROL (VITAMIN D3) 125 MCG
5 CAPSULE ORAL NIGHTLY
Status: DISCONTINUED | OUTPATIENT
Start: 2022-10-22 | End: 2022-11-03 | Stop reason: HOSPADM

## 2022-10-22 RX ORDER — DIPHENHYDRAMINE HCL 25 MG
25 TABLET ORAL EVERY 6 HOURS PRN
Status: DISCONTINUED | OUTPATIENT
Start: 2022-10-22 | End: 2022-10-26

## 2022-10-22 RX ORDER — FLUTICASONE PROPIONATE 50 MCG
2 SPRAY, SUSPENSION (ML) NASAL DAILY
Status: DISCONTINUED | OUTPATIENT
Start: 2022-10-22 | End: 2022-11-03 | Stop reason: HOSPADM

## 2022-10-22 RX ADMIN — OXYCODONE HYDROCHLORIDE 10 MG: 10 TABLET ORAL at 07:49

## 2022-10-22 RX ADMIN — OXYCODONE 5 MG: 5 TABLET ORAL at 04:33

## 2022-10-22 RX ADMIN — GABAPENTIN 100 MG: 100 CAPSULE ORAL at 04:33

## 2022-10-22 RX ADMIN — DIPHENHYDRAMINE HYDROCHLORIDE 25 MG: 50 INJECTION, SOLUTION INTRAMUSCULAR; INTRAVENOUS at 00:30

## 2022-10-22 RX ADMIN — METAXALONE 400 MG: 800 TABLET ORAL at 04:33

## 2022-10-22 RX ADMIN — METAXALONE 400 MG: 800 TABLET ORAL at 12:30

## 2022-10-22 RX ADMIN — ENOXAPARIN SODIUM 30 MG: 30 INJECTION SUBCUTANEOUS at 18:47

## 2022-10-22 RX ADMIN — POTASSIUM CHLORIDE 40 MEQ: 20 TABLET, EXTENDED RELEASE ORAL at 12:30

## 2022-10-22 RX ADMIN — OXYCODONE 5 MG: 5 TABLET ORAL at 20:31

## 2022-10-22 RX ADMIN — METAXALONE 400 MG: 800 TABLET ORAL at 18:47

## 2022-10-22 RX ADMIN — GABAPENTIN 100 MG: 100 CAPSULE ORAL at 13:22

## 2022-10-22 RX ADMIN — 0.12% CHLORHEXIDINE GLUCONATE 15 ML: 1.2 RINSE ORAL at 18:47

## 2022-10-22 RX ADMIN — OXYCODONE HYDROCHLORIDE 10 MG: 10 TABLET ORAL at 13:22

## 2022-10-22 RX ADMIN — GABAPENTIN 100 MG: 100 CAPSULE ORAL at 22:11

## 2022-10-22 RX ADMIN — DOCUSATE SODIUM 100 MG: 100 CAPSULE, LIQUID FILLED ORAL at 18:48

## 2022-10-22 RX ADMIN — POLYETHYLENE GLYCOL 3350 1 PACKET: 17 POWDER, FOR SOLUTION ORAL at 18:47

## 2022-10-22 RX ADMIN — TAMSULOSIN HYDROCHLORIDE 0.4 MG: 0.4 CAPSULE ORAL at 07:49

## 2022-10-22 RX ADMIN — CELECOXIB 200 MG: 200 CAPSULE ORAL at 04:33

## 2022-10-22 RX ADMIN — 0.12% CHLORHEXIDINE GLUCONATE 15 ML: 1.2 RINSE ORAL at 04:33

## 2022-10-22 RX ADMIN — Medication 5 MG: at 20:15

## 2022-10-22 RX ADMIN — ENOXAPARIN SODIUM 30 MG: 30 INJECTION SUBCUTANEOUS at 04:33

## 2022-10-22 RX ADMIN — POTASSIUM CHLORIDE 40 MEQ: 20 TABLET, EXTENDED RELEASE ORAL at 04:33

## 2022-10-22 RX ADMIN — POTASSIUM CHLORIDE 40 MEQ: 20 TABLET, EXTENDED RELEASE ORAL at 18:46

## 2022-10-22 RX ADMIN — DIPHENHYDRAMINE HYDROCHLORIDE 25 MG: 25 TABLET ORAL at 22:18

## 2022-10-22 ASSESSMENT — ENCOUNTER SYMPTOMS
HEADACHES: 0
FEVER: 0
VOMITING: 0
NECK PAIN: 1
MYALGIAS: 1
EYE PAIN: 1
DIZZINESS: 0
SENSORY CHANGE: 0
CARDIOVASCULAR NEGATIVE: 1
CHILLS: 0
TINGLING: 0
PSYCHIATRIC NEGATIVE: 1
SHORTNESS OF BREATH: 0
ABDOMINAL PAIN: 0
NAUSEA: 0
COUGH: 0
ROS GI COMMENTS: LAST BM 10/21
BACK PAIN: 0

## 2022-10-22 ASSESSMENT — PAIN DESCRIPTION - PAIN TYPE
TYPE: ACUTE PAIN;SURGICAL PAIN
TYPE: ACUTE PAIN;SURGICAL PAIN
TYPE: ACUTE PAIN
TYPE: ACUTE PAIN;SURGICAL PAIN
TYPE: ACUTE PAIN

## 2022-10-22 NOTE — PROGRESS NOTES
Bedside report received from day shift nurse.  Pt A&Ox4  Tolerating soft and chewable diet, denies n/v. Hypoactive bowel sounds, passing flatus, LBM 10/21/22. IV access through R foot that is SL.  BL UE splints, CDI. Platform WB. C collar in place, mepilex to back and chest. Incision under BL eyes, w/ dermabond, CDI.   Saturating >90% on 1L NC.  Pt ambulates SBA.  Pain is controlled through medication orders. Updated on plan of care. Safety education provided. Bed locked in low. Call light within reach. Rounding in place.

## 2022-10-22 NOTE — PROGRESS NOTES
Report received from RN, assumed care at 0645  Pt is A0X4, and responds appropriately   Pt declines any SOB, chest pain, new onset of numbness/ tingling  Pt rates pain at 7/10, on a scale of 1-10, pt medicated per MAR  Pt is voiding adequatly and without hesitancy  Pt has + flatus, + bowel sounds, + BM on 10/22  Pt ambulates with a x1 assist, C-collar on at all times. Platform WB to BUE   Pt is tolerating a diet, pt denies any nausea/vomiting  Declines use of bed alarm. Pt demonstrates compliance with call light  Plan of care discussed, all questions answered. Explained importance of calling before getting OOB and pt verbalizes understanding. Explained importance of oral care. Call light is within reach, treaded slipper socks on, bed in lowest/ locked position, hourly rounding in place, all needs met at this time

## 2022-10-22 NOTE — PROGRESS NOTES
Trauma / Surgical Daily Progress Note    Date of Service  10/22/2022    Chief Complaint  62 y.o. male admitted 10/13/2022 with Trauma, hiking and fall, spinal fracture, facial fxs, bilateral wrists fxs     10/17 ORIF intraarticular distal radius fracture. ORIF left intraarticular distal radius fracture. ORIF ulnar neck fracture.     10/18 ORIF bilateral [place] fractures through multiple surgical approaches including interdental fixation.       Interval Events  No overt changes in assessment. Has not been sleeping.  Adequate pain control.  Rash improved.   Supplemental oxygen 0.5-1 L NC. IS 1250.    - Melatonin initiated.  - Cluster nursing care.  - Aggressive pulmonary hygiene.  - Mobilize. In chair for all meals.   - Case management assisting with placement in [place] area.    Review of Systems  Review of Systems   Constitutional:  Negative for chills and fever.   HENT: Negative.     Eyes:  Positive for pain.   Respiratory:  Negative for cough and shortness of breath.    Cardiovascular: Negative.    Gastrointestinal:  Negative for abdominal pain, nausea and vomiting.        Last BM 10/21   Genitourinary:         Voiding   Musculoskeletal:  Positive for joint pain, myalgias and neck pain. Negative for back pain.   Skin: Negative.  Negative for itching.   Neurological:  Negative for dizziness, tingling, sensory change and headaches.   Endo/Heme/Allergies: Negative.    Psychiatric/Behavioral: Negative.     All other systems reviewed and are negative.     Vital Signs  Temp:  [36 °C (96.8 °F)-36.5 °C (97.7 °F)] 36 °C (96.8 °F)  Pulse:  [] 88  Resp:  [16-18] 18  BP: (138-156)/(72-93) 156/93  SpO2:  [90 %-95 %] 90 %    Physical Exam  Physical Exam  Vitals and nursing note reviewed.   Constitutional:       General: He is awake. He is not in acute distress.     Appearance: He is not toxic-appearing.      Interventions: Cervical collar in place.   HENT:      Head: Normocephalic.      Comments: Significant facial trauma.   Lip laceration approximated.   Under eyes sutured  Ecchymosis behind ears       Nose: Congestion present.      Mouth/Throat:      Mouth: Mucous membranes are dry.      Pharynx: Oropharynx is clear.   Eyes:      Conjunctiva/sclera:      Right eye: Right conjunctiva is injected.      Left eye: Left conjunctiva is injected.      Comments: Bilateral periorbital ecchymosis.    Neck:      Comments: Ccollar in place  Cardiovascular:      Rate and Rhythm: Normal rate and regular rhythm.      Pulses: Normal pulses.   Pulmonary:      Effort: Pulmonary effort is normal. No respiratory distress.      Breath sounds: Examination of the right-middle field reveals decreased breath sounds. Examination of the left-middle field reveals decreased breath sounds. Examination of the right-lower field reveals decreased breath sounds. Examination of the left-lower field reveals decreased breath sounds. Decreased breath sounds present.   Abdominal:      General: Bowel sounds are normal. There is no distension.      Palpations: Abdomen is soft.      Tenderness: There is no abdominal tenderness.   Genitourinary:     Comments: Condom cath  Musculoskeletal:         General: Tenderness and signs of injury present.      Cervical back: Tenderness present.      Comments: Splint to bilateral upper extremities, +CMS   Skin:     General: Skin is warm and dry.      Capillary Refill: Capillary refill takes less than 2 seconds.      Findings: Bruising and rash (back, improving) present.      Comments: Scattered abrasions   Neurological:      Mental Status: He is alert and oriented to person, place, and time.   Psychiatric:         Mood and Affect: Mood normal.         Behavior: Behavior normal. Behavior is cooperative.       Laboratory  No results found for this or any previous visit (from the past 24 hour(s)).    Fluids    Intake/Output Summary (Last 24 hours) at 10/22/2022 1234  Last data filed at 10/22/2022 0550  Gross per 24 hour   Intake 120 ml    Output 6350 ml   Net -6230 ml       Core Measures & Quality Metrics  Labs reviewed, Medications reviewed and Radiology images reviewed  Barajas catheter: No Barajas      DVT Prophylaxis: Enoxaparin (Lovenox)  DVT prophylaxis - mechanical: SCDs  Ulcer prophylaxis: Not indicated    Assessed for rehab: Patient was assess for and/or received rehabilitation services during this hospitalization  RAP Score Total: 5  CAGE Results: negative Blood Alcohol>0.08: no     Assessment/Plan  Multiple facial fractures, closed, initial encounter ([facility])- (present on admission)  Assessment & Plan  Fractures of the anterior, medial and superior maxillary sinuses bilaterally, fracture of the right lateral maxillary sinus, bilateral nasal fractures.  Comminuted frontal sinus fracture which appears to involve superior orbital walls bilaterally.    Comminuted displaced fracture of the right superior medial orbit, no retro-orbital hematoma.  Bilateral pterygoid plate fractures.  LeForte III type fracture  Keep HOB elevated  10/18 ORIF bilateral [place] fractures through multiple surgical approaches including interdental   fixation.  Huey Ahuja MD. Plastic Surgeon. [facility].    Discharge planning issues- (present on admission)  Assessment & Plan  Date of admission: 10/13/2022.  10/20 [place]  Cleared for discharge: No.  Discharge delayed: No.  Discharge date: tbd.    Traumatic closed displaced fracture of distal end of radius and ulna, left, initial encounter- (present on admission)  Assessment & Plan  Comminuted intra-articular distal radius fracture on left.    Significant displacement of large radial sided fracture fragment.  Comminuted fracture of distal ulna.  Splinted at referring facility.  Reduced in ED with conscious sedation  CT bilateral wrists completed  10/17 ORIF distal radius.  Weight bearing status - Platform weightbearing LUE. ADL's ok with hands under 5  lbs  Te Comer MD. Orthopedic Surgeon. [facility]. and Deyvi Ocampo MD. Orthopedic Surgeon. [facility].      Closed fracture distal radius and ulna, right, initial encounter- (present on admission)  Assessment & Plan  Comminuted dorsally angulated intra-articular distal radius fracture with impaction.    Mildly distracted fracture of the base of the ulnar styloid.  Splinted at referring facility  Reduced in ED with conscious sedation  CT bilateral wrists completed  10/18 ORIF distal radius.  Weight bearing status - Platform weightbearing RUE. ADL's ok with hands under 5 lbs  Te Comer MD. Orthopedic Surgeon. [facility].      Closed fracture of third cervical vertebra, initial encounter (HCC)- (present on admission)  Assessment & Plan  Fracture of the anterior inferior corner of the C3 vertebral body with slight anterior displacement approximately 3 mm.  Equivocal nondisplaced fracture line extending to superior endplate of the vertebral body.  Left C3-4 facet mildly widened with posterior displacement of C3 facet in relation to C4  CTA neck within normal limits  Non-operative management.   Cervical immobilization.   Recommend rigid cervical orthosis. Follow up in clinic 6 weeks with AP / lateral cervical spine xrays   Garrett Edgar MD, PhD. Neurosurgeon. [facility].        Lip laceration- (present on admission)  Assessment & Plan  Repaired in ED with absorbable suture     No contraindication to deep vein thrombosis (DVT) prophylaxis- (present on admission)  Assessment & Plan  Prophylactic anticoagulation for thrombotic prevention initially contraindicated secondary to elevated bleeding risk.  10/14 Prophylactic dose enoxaparin initiated.       Finger dislocation, initial encounter- (present on admission)  Assessment & Plan  Dorsal dislocation of the PIP joint of the long finger without associated fracture.  Reduced at referring facility.     Trauma- (present  on admission)  Assessment & Plan  Fall while hiking  Trauma Green Transfer Activation.  Marck Marquez MD. Trauma Surgery.       Discussed patient condition with Family, RN, Patient, and trauma surgery, Dr. Marquez.

## 2022-10-22 NOTE — CARE PLAN
The patient is Stable - Low risk of patient condition declining or worsening    Shift Goals  Clinical Goals: Pain control, rest  Patient Goals: Sleep  Family Goals: increase mobility and self care    Progress made toward(s) clinical / shift goals:  Pt resting. Pain managed w/ medication per MD order.     Patient is not progressing towards the following goals:

## 2022-10-22 NOTE — CARE PLAN
The patient is Stable - Low risk of patient condition declining or worsening    Shift Goals  Clinical Goals: change c-collar pads, mobility, WB status  Patient Goals: sleep comfortably  Family Goals: increase mobility and self care    Progress made toward(s) clinical / shift goals:  patient up mobilizing, new c-collar pads delivered    Patient is not progressing towards the following goals:

## 2022-10-23 PROCEDURE — 700102 HCHG RX REV CODE 250 W/ 637 OVERRIDE(OP): Performed by: SPECIALIST

## 2022-10-23 PROCEDURE — A9270 NON-COVERED ITEM OR SERVICE: HCPCS

## 2022-10-23 PROCEDURE — 302196 LINEN, HYPOALLERGENIC: Performed by: SURGERY

## 2022-10-23 PROCEDURE — A9270 NON-COVERED ITEM OR SERVICE: HCPCS | Performed by: SPECIALIST

## 2022-10-23 PROCEDURE — 700102 HCHG RX REV CODE 250 W/ 637 OVERRIDE(OP)

## 2022-10-23 PROCEDURE — 99232 SBSQ HOSP IP/OBS MODERATE 35: CPT

## 2022-10-23 PROCEDURE — A9270 NON-COVERED ITEM OR SERVICE: HCPCS | Performed by: PLASTIC SURGERY

## 2022-10-23 PROCEDURE — 700102 HCHG RX REV CODE 250 W/ 637 OVERRIDE(OP): Performed by: PLASTIC SURGERY

## 2022-10-23 PROCEDURE — 700111 HCHG RX REV CODE 636 W/ 250 OVERRIDE (IP): Performed by: SURGERY

## 2022-10-23 PROCEDURE — 770001 HCHG ROOM/CARE - MED/SURG/GYN PRIV*

## 2022-10-23 RX ORDER — AMLODIPINE BESYLATE 10 MG/1
5 TABLET ORAL
Status: DISCONTINUED | OUTPATIENT
Start: 2022-10-23 | End: 2022-11-03 | Stop reason: HOSPADM

## 2022-10-23 RX ORDER — TRAZODONE HYDROCHLORIDE 50 MG/1
50 TABLET ORAL
Status: DISCONTINUED | OUTPATIENT
Start: 2022-10-23 | End: 2022-11-03 | Stop reason: HOSPADM

## 2022-10-23 RX ADMIN — POTASSIUM CHLORIDE 40 MEQ: 20 TABLET, EXTENDED RELEASE ORAL at 10:58

## 2022-10-23 RX ADMIN — ENOXAPARIN SODIUM 30 MG: 30 INJECTION SUBCUTANEOUS at 04:43

## 2022-10-23 RX ADMIN — Medication 5 MG: at 21:22

## 2022-10-23 RX ADMIN — METAXALONE 400 MG: 800 TABLET ORAL at 04:42

## 2022-10-23 RX ADMIN — SENNOSIDES AND DOCUSATE SODIUM 1 TABLET: 50; 8.6 TABLET ORAL at 21:22

## 2022-10-23 RX ADMIN — 0.12% CHLORHEXIDINE GLUCONATE 15 ML: 1.2 RINSE ORAL at 16:38

## 2022-10-23 RX ADMIN — POTASSIUM CHLORIDE 40 MEQ: 20 TABLET, EXTENDED RELEASE ORAL at 04:42

## 2022-10-23 RX ADMIN — OXYCODONE 5 MG: 5 TABLET ORAL at 21:21

## 2022-10-23 RX ADMIN — POLYETHYLENE GLYCOL 3350 1 PACKET: 17 POWDER, FOR SOLUTION ORAL at 16:39

## 2022-10-23 RX ADMIN — AMLODIPINE BESYLATE 5 MG: 10 TABLET ORAL at 10:57

## 2022-10-23 RX ADMIN — OXYCODONE HYDROCHLORIDE 10 MG: 10 TABLET ORAL at 15:23

## 2022-10-23 RX ADMIN — METAXALONE 400 MG: 800 TABLET ORAL at 10:57

## 2022-10-23 RX ADMIN — TRAZODONE HYDROCHLORIDE 50 MG: 50 TABLET ORAL at 21:22

## 2022-10-23 RX ADMIN — OXYCODONE 5 MG: 5 TABLET ORAL at 04:42

## 2022-10-23 RX ADMIN — ENOXAPARIN SODIUM 30 MG: 30 INJECTION SUBCUTANEOUS at 16:38

## 2022-10-23 RX ADMIN — FLUTICASONE PROPIONATE 100 MCG: 50 SPRAY, METERED NASAL at 08:08

## 2022-10-23 RX ADMIN — DOCUSATE SODIUM 100 MG: 100 CAPSULE, LIQUID FILLED ORAL at 16:38

## 2022-10-23 RX ADMIN — GABAPENTIN 100 MG: 100 CAPSULE ORAL at 21:22

## 2022-10-23 RX ADMIN — GABAPENTIN 100 MG: 100 CAPSULE ORAL at 15:26

## 2022-10-23 RX ADMIN — GABAPENTIN 100 MG: 100 CAPSULE ORAL at 04:42

## 2022-10-23 RX ADMIN — 0.12% CHLORHEXIDINE GLUCONATE 15 ML: 1.2 RINSE ORAL at 04:43

## 2022-10-23 RX ADMIN — OXYCODONE HYDROCHLORIDE 10 MG: 10 TABLET ORAL at 08:07

## 2022-10-23 RX ADMIN — METAXALONE 400 MG: 800 TABLET ORAL at 16:38

## 2022-10-23 RX ADMIN — POTASSIUM CHLORIDE 40 MEQ: 20 TABLET, EXTENDED RELEASE ORAL at 16:38

## 2022-10-23 ASSESSMENT — PAIN DESCRIPTION - PAIN TYPE
TYPE: ACUTE PAIN;SURGICAL PAIN

## 2022-10-23 ASSESSMENT — ENCOUNTER SYMPTOMS
NAUSEA: 0
TINGLING: 0
ROS GI COMMENTS: LAST BM 10/22
EYE PAIN: 0
FEVER: 0
NECK PAIN: 1
PSYCHIATRIC NEGATIVE: 1
VOMITING: 0
MYALGIAS: 1
BACK PAIN: 0
CHILLS: 0
COUGH: 0
CARDIOVASCULAR NEGATIVE: 1
DIZZINESS: 0
ABDOMINAL PAIN: 0
SENSORY CHANGE: 0
SHORTNESS OF BREATH: 0
HEADACHES: 0

## 2022-10-23 NOTE — PROGRESS NOTES
Trauma / Surgical Daily Progress Note    Date of Service  10/23/2022    Chief Complaint  62 y.o. male admitted 10/13/2022 with Trauma, hiking and fall, spinal fracture, facial fxs, bilateral wrists fxs     10/17 ORIF intraarticular distal radius fracture. ORIF left intraarticular distal radius fracture. ORIF ulnar neck fracture.     10/18 ORIF bilateral [place] fractures through multiple surgical approaches including interdental fixation.    Interval Events  Sleeping better. Adequate pain control.  Mobilizing.   Hypertensive.  Supplemental oxygen 0.5 L NC. IS 1250.    - Cluster nursing care.  - Aggressive pulmonary hygiene and mobilization.  - Case management assisting with placement in [place] area.    Review of Systems  Review of Systems   Constitutional:  Negative for chills and fever.   HENT:  Positive for congestion.    Eyes:  Negative for pain.   Respiratory:  Negative for cough and shortness of breath.    Cardiovascular: Negative.    Gastrointestinal:  Negative for abdominal pain, nausea and vomiting.        Last BM 10/22   Genitourinary:         Voiding   Musculoskeletal:  Positive for joint pain, myalgias and neck pain. Negative for back pain.   Skin: Negative.  Negative for itching.   Neurological:  Negative for dizziness, tingling, sensory change and headaches.   Endo/Heme/Allergies: Negative.    Psychiatric/Behavioral: Negative.     All other systems reviewed and are negative.     Vital Signs  Temp:  [36.3 °C (97.4 °F)-37 °C (98.6 °F)] 36.3 °C (97.4 °F)  Pulse:  [] 92  Resp:  [17-18] 18  BP: (145-167)/(81-94) 167/89  SpO2:  [91 %-95 %] 91 %    Physical Exam  Physical Exam  Vitals and nursing note reviewed.   Constitutional:       General: He is awake. He is not in acute distress.     Appearance: He is not toxic-appearing.      Interventions: Cervical collar and nasal cannula in place.   HENT:      Head: Normocephalic.      Comments: Significant facial trauma.  Lip laceration approximated.   Under  eyes sutured  Ecchymosis behind ears improving       Nose: Congestion present.      Mouth/Throat:      Mouth: Mucous membranes are dry.      Pharynx: Oropharynx is clear.   Eyes:      Conjunctiva/sclera:      Right eye: Right conjunctiva is injected.      Left eye: Left conjunctiva is injected.      Comments: Bilateral periorbital ecchymosis.    Neck:      Comments: Ccollar in place  Cardiovascular:      Rate and Rhythm: Normal rate.      Pulses: Normal pulses.   Pulmonary:      Effort: Pulmonary effort is normal. No respiratory distress.      Breath sounds: Examination of the right-lower field reveals decreased breath sounds. Examination of the left-lower field reveals decreased breath sounds. Decreased breath sounds present.   Abdominal:      General: Bowel sounds are normal. There is no distension.      Palpations: Abdomen is soft.      Tenderness: There is no abdominal tenderness.   Genitourinary:     Comments: Condom cath  Musculoskeletal:         General: Tenderness and signs of injury present.      Cervical back: Tenderness present.      Comments: Splint to bilateral upper extremities, +CMS   Skin:     General: Skin is warm and dry.      Capillary Refill: Capillary refill takes less than 2 seconds.      Findings: Bruising and rash (back, improving) present.      Comments: Scattered abrasions   Neurological:      Mental Status: He is alert and oriented to person, place, and time.   Psychiatric:         Mood and Affect: Mood normal.         Behavior: Behavior normal. Behavior is cooperative.       Laboratory  No results found for this or any previous visit (from the past 24 hour(s)).    Fluids    Intake/Output Summary (Last 24 hours) at 10/23/2022 1021  Last data filed at 10/23/2022 0420  Gross per 24 hour   Intake 240 ml   Output 5700 ml   Net -5460 ml       Core Measures & Quality Metrics  Labs reviewed, Medications reviewed and Radiology images reviewed  Barajas catheter: No Barajas      DVT Prophylaxis:  Enoxaparin (Lovenox)  DVT prophylaxis - mechanical: SCDs  Ulcer prophylaxis: Not indicated    Assessed for rehab: Patient was assess for and/or received rehabilitation services during this hospitalization  RAP Score Total: 5  CAGE Results: negative Blood Alcohol>0.08: no     Assessment/Plan  Multiple facial fractures, closed, initial encounter (HCC)- (present on admission)  Assessment & Plan  Fractures of the anterior, medial and superior maxillary sinuses bilaterally, fracture of the right lateral maxillary sinus, bilateral nasal fractures.  Comminuted frontal sinus fracture which appears to involve superior orbital walls bilaterally.    Comminuted displaced fracture of the right superior medial orbit, no retro-orbital hematoma.  Bilateral pterygoid plate fractures.  LeForte III type fracture  Keep HOB elevated  10/18 ORIF bilateral [place] fractures through multiple surgical approaches including interdental   fixation.  Huey Ahuja MD. Plastic Surgeon. [facility].    Discharge planning issues- (present on admission)  Assessment & Plan  Date of admission: 10/13/2022.  10/20 Case management [place].  Cleared for discharge: No.  Discharge delayed: No.  Discharge date: tbd.    Traumatic closed displaced fracture of distal end of radius and ulna, left, initial encounter- (present on admission)  Assessment & Plan  Comminuted intra-articular distal radius fracture on left.    Significant displacement of large radial sided fracture fragment.  Comminuted fracture of distal ulna.  Splinted at referring facility.  Reduced in ED with conscious sedation  CT bilateral wrists completed  10/17 ORIF distal radius.  Weight bearing status - Platform weightbearing LUE. ADL's ok with hands under 5 lbs  Te Comer MD. Orthopedic Surgeon. [facility]. and Deyvi Ocampo MD. Orthopedic Surgeon. [facility].      Closed fracture distal radius and ulna, right,  initial encounter- (present on admission)  Assessment & Plan  Comminuted dorsally angulated intra-articular distal radius fracture with impaction.    Mildly distracted fracture of the base of the ulnar styloid.  Splinted at referring facility  Reduced in ED with conscious sedation  CT bilateral wrists completed  10/18 ORIF distal radius.  Weight bearing status - Platform weightbearing RUE. ADL's ok with hands under 5 lbs  Te Comer MD. Orthopedic Surgeon. [facility].      Closed fracture of third cervical vertebra, initial encounter ([facility])- (present on admission)  Assessment & Plan  Fracture of the anterior inferior corner of the C3 vertebral body with slight anterior displacement approximately 3 mm.  Equivocal nondisplaced fracture line extending to superior endplate of the vertebral body.  Left C3-4 facet mildly widened with posterior displacement of C3 facet in relation to C4  CTA neck within normal limits  Non-operative management.   Cervical immobilization.   Recommend rigid cervical orthosis. Follow up in clinic 6 weeks with AP / lateral cervical spine xrays   Garrett Edgar MD, PhD. Neurosurgeon. [facility].        Lip laceration- (present on admission)  Assessment & Plan  Repaired in ED with absorbable suture     No contraindication to deep vein thrombosis (DVT) prophylaxis- (present on admission)  Assessment & Plan  Prophylactic anticoagulation for thrombotic prevention initially contraindicated secondary to elevated bleeding risk.  10/14 Prophylactic dose enoxaparin initiated.       Finger dislocation, initial encounter- (present on admission)  Assessment & Plan  Dorsal dislocation of the PIP joint of the long finger without associated fracture.  Reduced at referring facility.     Trauma- (present on admission)  Assessment & Plan  Fall while hiking  Trauma Green Transfer Activation.  Marck Marquez MD. Trauma Surgery.       Discussed patient condition with RN, Patient,  and trauma surgery, Dr. Marquez.

## 2022-10-23 NOTE — PROGRESS NOTES
Bedside report received from day shift nurse.  Pt A&Ox4  Tolerating soft and chewable diet, denies n/v. Hypoactive bowel sounds, passing flatus, LBM 10/21/22. IV access through 20g RFA that is SL.  BL UE splints, CDI. Platform WB. C collar in place, mepilex to back and chest. Incision under BL eyes, w/ dermabond, CDI.   Saturating >90% on 1L NC.  Pt ambulates SBA.  Pain is controlled through medication orders. Updated on plan of care. Safety education provided. Bed locked in low. Call light within reach. Rounding in place.

## 2022-10-23 NOTE — PROGRESS NOTES
Bedside report received, assessment completed    A&O x  4, pt calls appropriately  Mobility: Up with SBA x1, no assistive devices needed   Fall Risk Assessment: n/a, bed alarm n/a, door notifications n/a  Pain Assessment / Reassessment completed, medication provided per MAR  Diet: soft and bite sized w/ thin liquids   LDA:   IV Access: 20G RFA, CDI/ flushed/ SL  GI/: condom cath void, + flatus, 10/22 BM  DVT Prophylaxis: lovenox, SCD on while in bed   Kory Score: 20, Interventions per flow sheet  Procedures:    - 10/17 Internal fixation bilateral wrist   - 10/18 Facial fx repair   D/C Plan:    - pending medical clearance   - [place] placement     Reviewed plan of care with patient, bed in lowest position and locked, pt resting comfortably now, call light within reach, all needs met at this time. Interventions will be executed per plan of care

## 2022-10-23 NOTE — DIETARY
Nutrition Services: Brief Update    Consult received for diet preferences for future meals.  Food preferences consult added for Nutrition Representative.  Nutrition representative to provide menu and obtain meals for daily menu.

## 2022-10-23 NOTE — CARE PLAN
The patient is Stable - Low risk of patient condition declining or worsening    Shift Goals  Clinical Goals: Sleep, pain control  Patient Goals: Sleep  Family Goals: increase mobility and self care    Progress made toward(s) clinical / shift goals:  Pt resting. Pain controlled w/ medication per MD order.     Patient is not progressing towards the following goals:

## 2022-10-23 NOTE — CARE PLAN
The patient is Stable - Low risk of patient condition declining or worsening    Shift Goals  Clinical Goals: Pain Mgt/ Ambulation  Patient Goals: Pain Mgt/ Ambulation  Family Goals: increase mobility and self care    Progress made toward(s) clinical / shift goals:      Problem: Knowledge Deficit - Standard  Goal: Patient and family/care givers will demonstrate understanding of plan of care, disease process/condition, diagnostic tests and medications  Outcome: Progressing     Problem: Pain - Standard  Goal: Alleviation of pain or a reduction in pain to the patient’s comfort goal  Outcome: Progressing     Problem: Fall Risk  Goal: Patient will remain free from falls  Outcome: Progressing

## 2022-10-24 ENCOUNTER — APPOINTMENT (OUTPATIENT)
Dept: RADIOLOGY | Facility: MEDICAL CENTER | Age: 62
DRG: 141 | End: 2022-10-24
Payer: COMMERCIAL

## 2022-10-24 PROBLEM — D72.829 LEUKOCYTOSIS: Status: ACTIVE | Noted: 2022-10-24

## 2022-10-24 LAB
ANION GAP SERPL CALC-SCNC: 11 MMOL/L (ref 7–16)
APPEARANCE UR: ABNORMAL
BACTERIA #/AREA URNS HPF: ABNORMAL /HPF
BASOPHILS # BLD AUTO: 0.5 % (ref 0–1.8)
BASOPHILS # BLD: 0.06 K/UL (ref 0–0.12)
BILIRUB UR QL STRIP.AUTO: NEGATIVE
BUN SERPL-MCNC: 14 MG/DL (ref 8–22)
CALCIUM SERPL-MCNC: 9 MG/DL (ref 8.5–10.5)
CHLORIDE SERPL-SCNC: 97 MMOL/L (ref 96–112)
CO2 SERPL-SCNC: 25 MMOL/L (ref 20–33)
COLOR UR: YELLOW
CREAT SERPL-MCNC: 0.61 MG/DL (ref 0.5–1.4)
EOSINOPHIL # BLD AUTO: 0.25 K/UL (ref 0–0.51)
EOSINOPHIL NFR BLD: 1.9 % (ref 0–6.9)
EPI CELLS #/AREA URNS HPF: NEGATIVE /HPF
ERYTHROCYTE [DISTWIDTH] IN BLOOD BY AUTOMATED COUNT: 41.2 FL (ref 35.9–50)
GFR SERPLBLD CREATININE-BSD FMLA CKD-EPI: 108 ML/MIN/1.73 M 2
GLUCOSE SERPL-MCNC: 115 MG/DL (ref 65–99)
GLUCOSE UR STRIP.AUTO-MCNC: NEGATIVE MG/DL
HCT VFR BLD AUTO: 37 % (ref 42–52)
HGB BLD-MCNC: 12.3 G/DL (ref 14–18)
HYALINE CASTS #/AREA URNS LPF: ABNORMAL /LPF
IMM GRANULOCYTES # BLD AUTO: 0.37 K/UL (ref 0–0.11)
IMM GRANULOCYTES NFR BLD AUTO: 2.8 % (ref 0–0.9)
KETONES UR STRIP.AUTO-MCNC: NEGATIVE MG/DL
LEUKOCYTE ESTERASE UR QL STRIP.AUTO: ABNORMAL
LYMPHOCYTES # BLD AUTO: 2.35 K/UL (ref 1–4.8)
LYMPHOCYTES NFR BLD: 18 % (ref 22–41)
MCH RBC QN AUTO: 29.7 PG (ref 27–33)
MCHC RBC AUTO-ENTMCNC: 33.2 G/DL (ref 33.7–35.3)
MCV RBC AUTO: 89.4 FL (ref 81.4–97.8)
MICRO URNS: ABNORMAL
MONOCYTES # BLD AUTO: 1.17 K/UL (ref 0–0.85)
MONOCYTES NFR BLD AUTO: 9 % (ref 0–13.4)
NEUTROPHILS # BLD AUTO: 8.87 K/UL (ref 1.82–7.42)
NEUTROPHILS NFR BLD: 67.8 % (ref 44–72)
NITRITE UR QL STRIP.AUTO: NEGATIVE
NRBC # BLD AUTO: 0 K/UL
NRBC BLD-RTO: 0 /100 WBC
PH UR STRIP.AUTO: 7.5 [PH] (ref 5–8)
PLATELET # BLD AUTO: 391 K/UL (ref 164–446)
PMV BLD AUTO: 9.5 FL (ref 9–12.9)
POTASSIUM SERPL-SCNC: 4.5 MMOL/L (ref 3.6–5.5)
PROT UR QL STRIP: NEGATIVE MG/DL
RBC # BLD AUTO: 4.14 M/UL (ref 4.7–6.1)
RBC # URNS HPF: ABNORMAL /HPF
RBC UR QL AUTO: NEGATIVE
SODIUM SERPL-SCNC: 133 MMOL/L (ref 135–145)
SP GR UR STRIP.AUTO: 1.01
UROBILINOGEN UR STRIP.AUTO-MCNC: 0.2 MG/DL
WBC # BLD AUTO: 13.1 K/UL (ref 4.8–10.8)
WBC #/AREA URNS HPF: ABNORMAL /HPF

## 2022-10-24 PROCEDURE — 770001 HCHG ROOM/CARE - MED/SURG/GYN PRIV*

## 2022-10-24 PROCEDURE — 36415 COLL VENOUS BLD VENIPUNCTURE: CPT

## 2022-10-24 PROCEDURE — 700111 HCHG RX REV CODE 636 W/ 250 OVERRIDE (IP): Performed by: SURGERY

## 2022-10-24 PROCEDURE — 93970 EXTREMITY STUDY: CPT

## 2022-10-24 PROCEDURE — A9270 NON-COVERED ITEM OR SERVICE: HCPCS | Performed by: PLASTIC SURGERY

## 2022-10-24 PROCEDURE — 71045 X-RAY EXAM CHEST 1 VIEW: CPT

## 2022-10-24 PROCEDURE — 700102 HCHG RX REV CODE 250 W/ 637 OVERRIDE(OP)

## 2022-10-24 PROCEDURE — 80048 BASIC METABOLIC PNL TOTAL CA: CPT

## 2022-10-24 PROCEDURE — A9270 NON-COVERED ITEM OR SERVICE: HCPCS

## 2022-10-24 PROCEDURE — 81001 URINALYSIS AUTO W/SCOPE: CPT

## 2022-10-24 PROCEDURE — 700102 HCHG RX REV CODE 250 W/ 637 OVERRIDE(OP): Performed by: PLASTIC SURGERY

## 2022-10-24 PROCEDURE — 93970 EXTREMITY STUDY: CPT | Mod: 26 | Performed by: INTERNAL MEDICINE

## 2022-10-24 PROCEDURE — 85025 COMPLETE CBC W/AUTO DIFF WBC: CPT

## 2022-10-24 PROCEDURE — 99232 SBSQ HOSP IP/OBS MODERATE 35: CPT

## 2022-10-24 PROCEDURE — A9270 NON-COVERED ITEM OR SERVICE: HCPCS | Performed by: SPECIALIST

## 2022-10-24 PROCEDURE — 700102 HCHG RX REV CODE 250 W/ 637 OVERRIDE(OP): Performed by: SPECIALIST

## 2022-10-24 RX ADMIN — METAXALONE 400 MG: 800 TABLET ORAL at 05:14

## 2022-10-24 RX ADMIN — POTASSIUM CHLORIDE 40 MEQ: 20 TABLET, EXTENDED RELEASE ORAL at 22:58

## 2022-10-24 RX ADMIN — OXYCODONE 5 MG: 5 TABLET ORAL at 18:28

## 2022-10-24 RX ADMIN — FLUTICASONE PROPIONATE 100 MCG: 50 SPRAY, METERED NASAL at 05:16

## 2022-10-24 RX ADMIN — OXYCODONE 5 MG: 5 TABLET ORAL at 03:03

## 2022-10-24 RX ADMIN — METAXALONE 400 MG: 800 TABLET ORAL at 17:05

## 2022-10-24 RX ADMIN — ENOXAPARIN SODIUM 30 MG: 30 INJECTION SUBCUTANEOUS at 05:13

## 2022-10-24 RX ADMIN — POLYETHYLENE GLYCOL 3350 1 PACKET: 17 POWDER, FOR SOLUTION ORAL at 05:14

## 2022-10-24 RX ADMIN — POTASSIUM CHLORIDE 40 MEQ: 20 TABLET, EXTENDED RELEASE ORAL at 12:41

## 2022-10-24 RX ADMIN — TRAZODONE HYDROCHLORIDE 50 MG: 50 TABLET ORAL at 21:24

## 2022-10-24 RX ADMIN — POTASSIUM CHLORIDE 40 MEQ: 20 TABLET, EXTENDED RELEASE ORAL at 05:13

## 2022-10-24 RX ADMIN — Medication 5 MG: at 21:24

## 2022-10-24 RX ADMIN — 0.12% CHLORHEXIDINE GLUCONATE 15 ML: 1.2 RINSE ORAL at 05:13

## 2022-10-24 RX ADMIN — GABAPENTIN 100 MG: 100 CAPSULE ORAL at 14:42

## 2022-10-24 RX ADMIN — DOCUSATE SODIUM 100 MG: 100 CAPSULE, LIQUID FILLED ORAL at 05:14

## 2022-10-24 RX ADMIN — OXYCODONE 5 MG: 5 TABLET ORAL at 09:28

## 2022-10-24 RX ADMIN — AMLODIPINE BESYLATE 5 MG: 10 TABLET ORAL at 05:14

## 2022-10-24 RX ADMIN — GABAPENTIN 100 MG: 100 CAPSULE ORAL at 05:13

## 2022-10-24 RX ADMIN — SENNOSIDES AND DOCUSATE SODIUM 1 TABLET: 50; 8.6 TABLET ORAL at 21:23

## 2022-10-24 RX ADMIN — METAXALONE 400 MG: 800 TABLET ORAL at 12:41

## 2022-10-24 RX ADMIN — ENOXAPARIN SODIUM 30 MG: 30 INJECTION SUBCUTANEOUS at 17:05

## 2022-10-24 RX ADMIN — GABAPENTIN 100 MG: 100 CAPSULE ORAL at 21:24

## 2022-10-24 RX ADMIN — 0.12% CHLORHEXIDINE GLUCONATE 15 ML: 1.2 RINSE ORAL at 17:05

## 2022-10-24 RX ADMIN — MAGNESIUM HYDROXIDE 30 ML: 400 SUSPENSION ORAL at 05:13

## 2022-10-24 ASSESSMENT — ENCOUNTER SYMPTOMS
PSYCHIATRIC NEGATIVE: 1
RESPIRATORY NEGATIVE: 1
SENSORY CHANGE: 0
NAUSEA: 0
HEADACHES: 0
CARDIOVASCULAR NEGATIVE: 1
FEVER: 0
DIZZINESS: 0
NECK PAIN: 1
ABDOMINAL PAIN: 0
MYALGIAS: 1
CHILLS: 0
VOMITING: 0
EYE PAIN: 0
BACK PAIN: 0
TINGLING: 0

## 2022-10-24 ASSESSMENT — PAIN DESCRIPTION - PAIN TYPE
TYPE: ACUTE PAIN;SURGICAL PAIN
TYPE: ACUTE PAIN;SURGICAL PAIN

## 2022-10-24 NOTE — PROGRESS NOTES
Trauma / Surgical Daily Progress Note    Date of Service  10/24/2022    Chief Complaint  62 y.o. male admitted 10/13/2022 with Trauma, hiking and fall, spinal fracture, facial fxs, bilateral wrists fxs     10/17 ORIF intraarticular distal radius fracture. ORIF left intraarticular distal radius fracture. ORIF ulnar neck fracture.     10/18 ORIF bilateral [place] fractures through multiple surgical approaches including interdental fixation.    Interval Events  No overt changes in assessment.  Leukocytosis. Afebrile. Nontoxic in appearance. All wounds assessed.   CXR stable.    - UA and duplex pending.  - Aggressive pulmonary hygiene and mobilization.  - Case management assisting with placement in [place] area.    Review of Systems  Review of Systems   Constitutional:  Negative for chills and fever.   HENT:  Positive for congestion.    Eyes:  Negative for pain.   Respiratory: Negative.     Cardiovascular: Negative.    Gastrointestinal:  Negative for abdominal pain, nausea and vomiting.        Last BM 10/23   Genitourinary:         Voiding   Musculoskeletal:  Positive for joint pain, myalgias and neck pain. Negative for back pain.   Skin: Negative.  Negative for itching.   Neurological:  Negative for dizziness, tingling, sensory change and headaches.   Endo/Heme/Allergies: Negative.    Psychiatric/Behavioral: Negative.     All other systems reviewed and are negative.     Vital Signs  Temp:  [36.2 °C (97.1 °F)-37.1 °C (98.7 °F)] 37.1 °C (98.7 °F)  Pulse:  [] 88  Resp:  [17-18] 17  BP: (144-157)/(72-84) 144/72  SpO2:  [95 %-98 %] 96 %    Physical Exam  Physical Exam  Vitals and nursing note reviewed.   Constitutional:       General: He is awake. He is not in acute distress.     Appearance: He is not toxic-appearing.      Interventions: Cervical collar and nasal cannula in place.   HENT:      Head: Normocephalic.      Comments: Significant facial trauma.  Lip laceration approximated.   Under eyes sutured  Ecchymosis  behind ears improving       Nose: Congestion present.      Mouth/Throat:      Mouth: Mucous membranes are dry.      Pharynx: Oropharynx is clear.   Eyes:      Conjunctiva/sclera:      Right eye: Right conjunctiva is injected.      Left eye: Left conjunctiva is injected.      Comments: Bilateral periorbital ecchymosis, improving   Neck:      Comments: Ccollar in place  Cardiovascular:      Rate and Rhythm: Normal rate.      Pulses: Normal pulses.   Pulmonary:      Effort: Pulmonary effort is normal. No respiratory distress.      Breath sounds: Examination of the right-lower field reveals decreased breath sounds. Examination of the left-lower field reveals decreased breath sounds. Decreased breath sounds present.   Abdominal:      General: Bowel sounds are normal. There is no distension.      Palpations: Abdomen is soft.      Tenderness: There is no abdominal tenderness.   Genitourinary:     Comments: Condom cath  Musculoskeletal:         General: Tenderness and signs of injury present.      Cervical back: Tenderness present.      Comments: Splint to bilateral upper extremities, +CMS   Skin:     General: Skin is warm and dry.      Capillary Refill: Capillary refill takes less than 2 seconds.      Findings: Bruising present. No rash.      Comments: Scattered abrasions   Neurological:      Mental Status: He is alert and oriented to person, place, and time.   Psychiatric:         Mood and Affect: Mood normal.         Behavior: Behavior normal. Behavior is cooperative.       Laboratory  Recent Results (from the past 24 hour(s))   Basic Metabolic Panel (BMP): Tomorrow AM    Collection Time: 10/24/22  2:34 AM   Result Value Ref Range    Sodium 133 (L) 135 - 145 mmol/L    Potassium 4.5 3.6 - 5.5 mmol/L    Chloride 97 96 - 112 mmol/L    Co2 25 20 - 33 mmol/L    Glucose 115 (H) 65 - 99 mg/dL    Bun 14 8 - 22 mg/dL    Creatinine 0.61 0.50 - 1.40 mg/dL    Calcium 9.0 8.5 - 10.5 mg/dL    Anion Gap 11.0 7.0 - 16.0   CBC with  Differential: Tomorrow AM    Collection Time: 10/24/22  2:34 AM   Result Value Ref Range    WBC 13.1 (H) 4.8 - 10.8 K/uL    RBC 4.14 (L) 4.70 - 6.10 M/uL    Hemoglobin 12.3 (L) 14.0 - 18.0 g/dL    Hematocrit 37.0 (L) 42.0 - 52.0 %    MCV 89.4 81.4 - 97.8 fL    MCH 29.7 27.0 - 33.0 pg    MCHC 33.2 (L) 33.7 - 35.3 g/dL    RDW 41.2 35.9 - 50.0 fL    Platelet Count 391 164 - 446 K/uL    MPV 9.5 9.0 - 12.9 fL    Neutrophils-Polys 67.80 44.00 - 72.00 %    Lymphocytes 18.00 (L) 22.00 - 41.00 %    Monocytes 9.00 0.00 - 13.40 %    Eosinophils 1.90 0.00 - 6.90 %    Basophils 0.50 0.00 - 1.80 %    Immature Granulocytes 2.80 (H) 0.00 - 0.90 %    Nucleated RBC 0.00 /100 WBC    Neutrophils (Absolute) 8.87 (H) 1.82 - 7.42 K/uL    Lymphs (Absolute) 2.35 1.00 - 4.80 K/uL    Monos (Absolute) 1.17 (H) 0.00 - 0.85 K/uL    Eos (Absolute) 0.25 0.00 - 0.51 K/uL    Baso (Absolute) 0.06 0.00 - 0.12 K/uL    Immature Granulocytes (abs) 0.37 (H) 0.00 - 0.11 K/uL    NRBC (Absolute) 0.00 K/uL   ESTIMATED GFR    Collection Time: 10/24/22  2:34 AM   Result Value Ref Range    GFR (CKD-EPI) 108 >60 mL/min/1.73 m 2       Fluids    Intake/Output Summary (Last 24 hours) at 10/24/2022 1004  Last data filed at 10/24/2022 0259  Gross per 24 hour   Intake 720 ml   Output 5000 ml   Net -4280 ml       Core Measures & Quality Metrics  Labs reviewed, Medications reviewed and Radiology images reviewed  Barajas catheter: No Barajas      DVT Prophylaxis: Enoxaparin (Lovenox)  DVT prophylaxis - mechanical: SCDs  Ulcer prophylaxis: Not indicated    Assessed for rehab: Patient was assess for and/or received rehabilitation services during this hospitalization  RAP Score Total: 5  CAGE Results: negative Blood Alcohol>0.08: no     Assessment/Plan  Leukocytosis- (present on admission)  Assessment & Plan  10/24 WBC 13.1. Afebrile. Nontoxic appearance.  - CXR stable.  - UA and duplex pending.  Monitor.    Multiple facial fractures, closed, initial encounter (HCC)- (present on  admission)  Assessment & Plan  Fractures of the anterior, medial and superior maxillary sinuses bilaterally, fracture of the right lateral maxillary sinus, bilateral nasal fractures.  Comminuted frontal sinus fracture which appears to involve superior orbital walls bilaterally.    Comminuted displaced fracture of the right superior medial orbit, no retro-orbital hematoma.  Bilateral pterygoid plate fractures.  LeForte III type fracture  Keep HOB elevated  10/18 ORIF bilateral [place] fractures through multiple surgical approaches including interdental   fixation.  Huey Ahuja MD. Plastic Surgeon. [facility].    Discharge planning issues- (present on admission)  Assessment & Plan  Date of admission: 10/13/2022.  10/20 [place]  Cleared for discharge: No.  Discharge delayed: No.  Discharge date: tbd.    Traumatic closed displaced fracture of distal end of radius and ulna, left, initial encounter- (present on admission)  Assessment & Plan  Comminuted intra-articular distal radius fracture on left.    Significant displacement of large radial sided fracture fragment.  Comminuted fracture of distal ulna.  Splinted at referring facility.  Reduced in ED with conscious sedation  CT bilateral wrists completed  10/17 ORIF distal radius.  Weight bearing status - Platform weightbearing LUE. ADL's ok with hands under 5 lbs  Te Comer MD. Orthopedic Surgeon. [facility]. and Deyvi Ocampo MD. Orthopedic Surgeon. [facility].      Closed fracture distal radius and ulna, right, initial encounter- (present on admission)  Assessment & Plan  Comminuted dorsally angulated intra-articular distal radius fracture with impaction.    Mildly distracted fracture of the base of the ulnar styloid.  Splinted at referring facility  Reduced in ED with conscious sedation  CT bilateral wrists completed  10/18 ORIF distal radius.  Weight bearing status -  Platform weightbearing RUE. ADL's ok with hands under 5 lbs  Te Comer MD. Orthopedic Surgeon. [facility].      Closed fracture of third cervical vertebra, initial encounter (HCC)- (present on admission)  Assessment & Plan  Fracture of the anterior inferior corner of the C3 vertebral body with slight anterior displacement approximately 3 mm.  Equivocal nondisplaced fracture line extending to superior endplate of the vertebral body.  Left C3-4 facet mildly widened with posterior displacement of C3 facet in relation to C4  CTA neck within normal limits  Non-operative management.   Cervical immobilization.   Recommend rigid cervical orthosis. Follow up in clinic 6 weeks with AP / lateral cervical spine xrays   Garrett Edgar MD, PhD. Neurosurgeon. [facility].        Lip laceration- (present on admission)  Assessment & Plan  Repaired in ED with absorbable suture     No contraindication to deep vein thrombosis (DVT) prophylaxis- (present on admission)  Assessment & Plan  Prophylactic anticoagulation for thrombotic prevention initially contraindicated secondary to elevated bleeding risk.  10/14 Prophylactic dose enoxaparin initiated.       Finger dislocation, initial encounter- (present on admission)  Assessment & Plan  Dorsal dislocation of the PIP joint of the long finger without associated fracture.  Reduced at referring facility.     Trauma- (present on admission)  Assessment & Plan  Fall while hiking  Trauma Green Transfer Activation.  Marck Marquez MD. Trauma Surgery.         Discussed patient condition with RN, Patient, and trauma surgery, Dr. Marquez.

## 2022-10-24 NOTE — PROGRESS NOTES
Bedside report received from previous shift RN and assumed full care of patient.  Assessments complete as per flowsheets.    Alert & Oriented x4.   Patient denies chest pain/shortness of breath.  Patient reports 0/10 pain.     Medication given per MAR.     Patient tolerating diet, currently denies nausea and vomiting.    Patient ambulating with standby assistance.    All patient needs have been met at this time, call light within reach.  Reinforced falls program rationale with patient.   Verified bed is in low and locked position, non-skid socks in place.    Hourly rounding in place.

## 2022-10-24 NOTE — DISCHARGE PLANNING
Agency/Facility Name:[facility]   Outcome: DPA called to request update on referral status, no answer with admissions dept and vmail is full.    Agency/Facility Name: [facility]  Outcome: DPA left vmail requesting updated referral status     Agency/Facility Name: [facility]   Spoke To: Emmett  Outcome: DPA called requesting update on referral sent over, Emmett asked that DPA re fax referral to her personal fax at [phone]  Admissions- Emmett [phone]    LSW notified

## 2022-10-24 NOTE — CARE PLAN
The patient is Stable - Low risk of patient condition declining or worsening    Shift Goals  Clinical Goals: Pain control, comfort  Patient Goals: Pain control, comfort  Family Goals: increase mobility and self care    Progress made toward(s) clinical / shift goals:  Pain managed with prescribed medications and rest. Patient repositioned for comfort.    Patient is not progressing towards the following goals:

## 2022-10-24 NOTE — ASSESSMENT & PLAN NOTE
10/24 WBC 13.1. Afebrile. Nontoxic appearance.  - CXR stable. Duplex negative for DVT.  10/26 WBC 15.  - CXR negative   UA positive - initiate Septra DS x 7 days

## 2022-10-24 NOTE — CARE PLAN
The patient is Stable - Low risk of patient condition declining or worsening    Shift Goals  Clinical Goals: Pain control, comfort  Patient Goals: Pain control, comfort  Family Goals: increase mobility and self care

## 2022-10-24 NOTE — PROGRESS NOTES
Received report from previous shift RN.  Assessment complete.  A&O x 4. Patient calls appropriately.  Patient ambulates with standby assist.  Patient denies SOB.  Patient has 5/10 pain. Pain managed per MAR.  Denies N&V. Tolerating soft bite sized diet.  BL UE splints, CDI. BL under-eye incisions with Dermabond, CDI.  + void via condom catheter, + flatus, last BM 10/22.  Patient pleasant and cooperative with plan of care.  Call light and personal belongings with in reach. Hourly rounding in place. All needs met at this time.

## 2022-10-25 LAB
BASOPHILS # BLD AUTO: 0.4 % (ref 0–1.8)
BASOPHILS # BLD: 0.08 K/UL (ref 0–0.12)
EOSINOPHIL # BLD AUTO: 0.14 K/UL (ref 0–0.51)
EOSINOPHIL NFR BLD: 0.8 % (ref 0–6.9)
ERYTHROCYTE [DISTWIDTH] IN BLOOD BY AUTOMATED COUNT: 40.6 FL (ref 35.9–50)
HCT VFR BLD AUTO: 37 % (ref 42–52)
HGB BLD-MCNC: 12.4 G/DL (ref 14–18)
IMM GRANULOCYTES # BLD AUTO: 0.69 K/UL (ref 0–0.11)
IMM GRANULOCYTES NFR BLD AUTO: 3.8 % (ref 0–0.9)
LYMPHOCYTES # BLD AUTO: 1.79 K/UL (ref 1–4.8)
LYMPHOCYTES NFR BLD: 10 % (ref 22–41)
MCH RBC QN AUTO: 29.5 PG (ref 27–33)
MCHC RBC AUTO-ENTMCNC: 33.5 G/DL (ref 33.7–35.3)
MCV RBC AUTO: 87.9 FL (ref 81.4–97.8)
MONOCYTES # BLD AUTO: 1.35 K/UL (ref 0–0.85)
MONOCYTES NFR BLD AUTO: 7.5 % (ref 0–13.4)
NEUTROPHILS # BLD AUTO: 13.89 K/UL (ref 1.82–7.42)
NEUTROPHILS NFR BLD: 77.5 % (ref 44–72)
NRBC # BLD AUTO: 0 K/UL
NRBC BLD-RTO: 0 /100 WBC
PLATELET # BLD AUTO: 416 K/UL (ref 164–446)
PMV BLD AUTO: 9.1 FL (ref 9–12.9)
RBC # BLD AUTO: 4.21 M/UL (ref 4.7–6.1)
WBC # BLD AUTO: 17.9 K/UL (ref 4.8–10.8)

## 2022-10-25 PROCEDURE — 36415 COLL VENOUS BLD VENIPUNCTURE: CPT

## 2022-10-25 PROCEDURE — 97535 SELF CARE MNGMENT TRAINING: CPT

## 2022-10-25 PROCEDURE — 97116 GAIT TRAINING THERAPY: CPT

## 2022-10-25 PROCEDURE — 97530 THERAPEUTIC ACTIVITIES: CPT

## 2022-10-25 PROCEDURE — A9270 NON-COVERED ITEM OR SERVICE: HCPCS | Performed by: SPECIALIST

## 2022-10-25 PROCEDURE — 99232 SBSQ HOSP IP/OBS MODERATE 35: CPT

## 2022-10-25 PROCEDURE — 700102 HCHG RX REV CODE 250 W/ 637 OVERRIDE(OP): Performed by: PLASTIC SURGERY

## 2022-10-25 PROCEDURE — 770001 HCHG ROOM/CARE - MED/SURG/GYN PRIV*

## 2022-10-25 PROCEDURE — 85025 COMPLETE CBC W/AUTO DIFF WBC: CPT

## 2022-10-25 PROCEDURE — 700102 HCHG RX REV CODE 250 W/ 637 OVERRIDE(OP)

## 2022-10-25 PROCEDURE — A9270 NON-COVERED ITEM OR SERVICE: HCPCS

## 2022-10-25 PROCEDURE — 700111 HCHG RX REV CODE 636 W/ 250 OVERRIDE (IP): Performed by: SURGERY

## 2022-10-25 PROCEDURE — A9270 NON-COVERED ITEM OR SERVICE: HCPCS | Performed by: PLASTIC SURGERY

## 2022-10-25 PROCEDURE — 700102 HCHG RX REV CODE 250 W/ 637 OVERRIDE(OP): Performed by: SPECIALIST

## 2022-10-25 RX ADMIN — OXYCODONE 5 MG: 5 TABLET ORAL at 01:51

## 2022-10-25 RX ADMIN — OXYCODONE 5 MG: 5 TABLET ORAL at 23:53

## 2022-10-25 RX ADMIN — FLUTICASONE PROPIONATE 100 MCG: 50 SPRAY, METERED NASAL at 06:27

## 2022-10-25 RX ADMIN — TRAZODONE HYDROCHLORIDE 50 MG: 50 TABLET ORAL at 20:31

## 2022-10-25 RX ADMIN — POTASSIUM CHLORIDE 40 MEQ: 20 TABLET, EXTENDED RELEASE ORAL at 06:17

## 2022-10-25 RX ADMIN — Medication 5 MG: at 20:31

## 2022-10-25 RX ADMIN — DOCUSATE SODIUM 100 MG: 100 CAPSULE, LIQUID FILLED ORAL at 06:17

## 2022-10-25 RX ADMIN — METAXALONE 400 MG: 800 TABLET ORAL at 06:17

## 2022-10-25 RX ADMIN — METAXALONE 400 MG: 800 TABLET ORAL at 17:09

## 2022-10-25 RX ADMIN — GABAPENTIN 100 MG: 100 CAPSULE ORAL at 20:31

## 2022-10-25 RX ADMIN — ENOXAPARIN SODIUM 30 MG: 30 INJECTION SUBCUTANEOUS at 06:16

## 2022-10-25 RX ADMIN — SENNOSIDES AND DOCUSATE SODIUM 1 TABLET: 50; 8.6 TABLET ORAL at 20:31

## 2022-10-25 RX ADMIN — OXYCODONE 5 MG: 5 TABLET ORAL at 16:00

## 2022-10-25 RX ADMIN — GABAPENTIN 100 MG: 100 CAPSULE ORAL at 06:17

## 2022-10-25 RX ADMIN — METAXALONE 400 MG: 800 TABLET ORAL at 12:20

## 2022-10-25 RX ADMIN — POTASSIUM CHLORIDE 40 MEQ: 20 TABLET, EXTENDED RELEASE ORAL at 17:09

## 2022-10-25 RX ADMIN — POTASSIUM CHLORIDE 40 MEQ: 20 TABLET, EXTENDED RELEASE ORAL at 12:20

## 2022-10-25 RX ADMIN — AMLODIPINE BESYLATE 5 MG: 10 TABLET ORAL at 06:17

## 2022-10-25 RX ADMIN — 0.12% CHLORHEXIDINE GLUCONATE 15 ML: 1.2 RINSE ORAL at 06:16

## 2022-10-25 RX ADMIN — ENOXAPARIN SODIUM 30 MG: 30 INJECTION SUBCUTANEOUS at 17:09

## 2022-10-25 RX ADMIN — OXYCODONE 5 MG: 5 TABLET ORAL at 20:30

## 2022-10-25 RX ADMIN — POLYETHYLENE GLYCOL 3350 1 PACKET: 17 POWDER, FOR SOLUTION ORAL at 06:17

## 2022-10-25 RX ADMIN — MAGNESIUM HYDROXIDE 30 ML: 400 SUSPENSION ORAL at 06:16

## 2022-10-25 RX ADMIN — GABAPENTIN 100 MG: 100 CAPSULE ORAL at 12:20

## 2022-10-25 RX ADMIN — 0.12% CHLORHEXIDINE GLUCONATE 15 ML: 1.2 RINSE ORAL at 17:09

## 2022-10-25 RX ADMIN — OXYCODONE 5 MG: 5 TABLET ORAL at 09:11

## 2022-10-25 ASSESSMENT — PAIN DESCRIPTION - PAIN TYPE
TYPE: ACUTE PAIN;SURGICAL PAIN

## 2022-10-25 ASSESSMENT — GAIT ASSESSMENTS
DEVIATION: SHUFFLED GAIT;BRADYKINETIC
GAIT LEVEL OF ASSIST: STANDBY ASSIST
DISTANCE (FEET): 150

## 2022-10-25 ASSESSMENT — ENCOUNTER SYMPTOMS
RESPIRATORY NEGATIVE: 1
ROS GI COMMENTS: LAST BM 10/24
BACK PAIN: 0
NAUSEA: 0
MYALGIAS: 1
NECK PAIN: 1
EYE PAIN: 0
TINGLING: 0
VOMITING: 0
SENSORY CHANGE: 0
CHILLS: 0
FEVER: 0
CARDIOVASCULAR NEGATIVE: 1
PSYCHIATRIC NEGATIVE: 1
HEADACHES: 0
ABDOMINAL PAIN: 0
DIZZINESS: 0

## 2022-10-25 ASSESSMENT — COGNITIVE AND FUNCTIONAL STATUS - GENERAL
WALKING IN HOSPITAL ROOM: A LITTLE
DRESSING REGULAR UPPER BODY CLOTHING: A LITTLE
TURNING FROM BACK TO SIDE WHILE IN FLAT BAD: A LITTLE
SUGGESTED CMS G CODE MODIFIER MOBILITY: CK
SUGGESTED CMS G CODE MODIFIER DAILY ACTIVITY: CK
TOILETING: A LOT
MOVING FROM LYING ON BACK TO SITTING ON SIDE OF FLAT BED: A LITTLE
PERSONAL GROOMING: A LITTLE
MOVING TO AND FROM BED TO CHAIR: A LITTLE
DAILY ACTIVITIY SCORE: 16
HELP NEEDED FOR BATHING: A LOT
MOBILITY SCORE: 18
CLIMB 3 TO 5 STEPS WITH RAILING: A LITTLE
STANDING UP FROM CHAIR USING ARMS: A LITTLE
DRESSING REGULAR LOWER BODY CLOTHING: A LITTLE
EATING MEALS: A LITTLE

## 2022-10-25 NOTE — PROGRESS NOTES
Trauma / Surgical Daily Progress Note    Date of Service  10/25/2022    Chief Complaint  62 y.o. male admitted 10/13/2022 with Trauma, hiking and fall, spinal fracture, facial fxs, bilateral wrists fxs     10/17 ORIF intraarticular distal radius fracture. ORIF left intraarticular distal radius fracture. ORIF ulnar neck fracture.     10/18 ORIF bilateral [place] fractures through multiple surgical approaches including interdental fixation.    Interval Events  Doing well. Ambulating in the [place]. Adequate pain control.  UA and duplex negative.Afebrile.    - Aggressive pulmonary hygiene and mobilization.  - Therapies while in house.  - Case management assisting with placement in [place] area.    Review of Systems  Review of Systems   Constitutional:  Negative for chills and fever.   HENT:  Positive for congestion.    Eyes:  Negative for pain.   Respiratory: Negative.     Cardiovascular: Negative.    Gastrointestinal:  Negative for abdominal pain, nausea and vomiting.        Last BM 10/24   Genitourinary:         Voiding   Musculoskeletal:  Positive for joint pain, myalgias and neck pain. Negative for back pain.   Skin: Negative.    Neurological:  Negative for dizziness, tingling, sensory change and headaches.   Endo/Heme/Allergies: Negative.    Psychiatric/Behavioral: Negative.     All other systems reviewed and are negative.     Vital Signs  Temp:  [36.2 °C (97.2 °F)-36.5 °C (97.7 °F)] 36.2 °C (97.2 °F)  Pulse:  [] 89  Resp:  [15-16] 15  BP: (129-136)/(70-77) 129/77  SpO2:  [95 %-96 %] 95 %    Physical Exam  Physical Exam  Vitals and nursing note reviewed.   Constitutional:       General: He is awake. He is not in acute distress.     Appearance: He is not toxic-appearing.      Interventions: Cervical collar in place.   HENT:      Head: Normocephalic.      Comments: Significant facial trauma.  Lip laceration approximated.   Under eyes sutured  Ecchymosis behind ears improving       Nose: Congestion present.       Mouth/Throat:      Mouth: Mucous membranes are dry.      Pharynx: Oropharynx is clear.   Eyes:      Conjunctiva/sclera:      Right eye: Right conjunctiva is injected.      Left eye: Left conjunctiva is injected.      Comments: Bilateral periorbital ecchymosis, improving   Neck:      Comments: Ccollar in place  Cardiovascular:      Rate and Rhythm: Normal rate.      Pulses: Normal pulses.   Pulmonary:      Effort: Pulmonary effort is normal. No respiratory distress.      Breath sounds: Examination of the right-lower field reveals decreased breath sounds. Examination of the left-lower field reveals decreased breath sounds. Decreased breath sounds present.   Abdominal:      General: Bowel sounds are normal. There is no distension.      Palpations: Abdomen is soft.      Tenderness: There is no abdominal tenderness.   Genitourinary:     Comments: Condom cath  Musculoskeletal:         General: Tenderness and signs of injury present.      Cervical back: Tenderness present.      Comments: Splint to bilateral upper extremities, +CMS   Skin:     General: Skin is warm and dry.      Capillary Refill: Capillary refill takes less than 2 seconds.      Findings: No bruising or rash.      Comments: Scattered abrasions   Neurological:      Mental Status: He is alert and oriented to person, place, and time.   Psychiatric:         Mood and Affect: Mood normal.         Behavior: Behavior normal. Behavior is cooperative.       Laboratory  No results found for this or any previous visit (from the past 24 hour(s)).      Fluids    Intake/Output Summary (Last 24 hours) at 10/25/2022 1346  Last data filed at 10/25/2022 0829  Gross per 24 hour   Intake --   Output 4700 ml   Net -4700 ml       Core Measures & Quality Metrics  Medications reviewed  Barajas catheter: No Barajas      DVT Prophylaxis: Enoxaparin (Lovenox)  DVT prophylaxis - mechanical: SCDs  Ulcer prophylaxis: Not indicated    Assessed for rehab: Patient was assess for and/or received  rehabilitation services during this hospitalization  RAP Score Total: 5  CAGE Results: negative Blood Alcohol>0.08: no     Assessment/Plan  Leukocytosis- (present on admission)  Assessment & Plan  10/24 WBC 13.1. Afebrile. Nontoxic appearance.  - CXR stable.  - UA negative. Duplex negative for DVT.  Monitor.    Discharge planning issues- (present on admission)  Assessment & Plan  Date of admission: 10/13/2022.  10/20 [place].  Cleared for discharge: Yes - Date: 10/25.  Discharge delayed: Yes - Reason: Non-availability of Transfer Facility.  Discharge date: tbd.    Multiple facial fractures, closed, initial encounter ([facility])- (present on admission)  Assessment & Plan  Fractures of the anterior, medial and superior maxillary sinuses bilaterally, fracture of the right lateral maxillary sinus, bilateral nasal fractures.  Comminuted frontal sinus fracture which appears to involve superior orbital walls bilaterally.    Comminuted displaced fracture of the right superior medial orbit, no retro-orbital hematoma.  Bilateral pterygoid plate fractures.  LeForte III type fracture  Keep HOB elevated  10/18 ORIF bilateral [place] fractures through multiple surgical approaches including interdental   fixation.  Huey Ahuja MD. Plastic Surgeon. [facility].    Traumatic closed displaced fracture of distal end of radius and ulna, left, initial encounter- (present on admission)  Assessment & Plan  Comminuted intra-articular distal radius fracture on left.    Significant displacement of large radial sided fracture fragment.  Comminuted fracture of distal ulna.  Splinted at referring facility.  Reduced in ED with conscious sedation  CT bilateral wrists completed  10/17 ORIF distal radius.  Weight bearing status - Platform weightbearing LUE. ADL's ok with hands under 5 lbs  Te Comer MD. Orthopedic Surgeon. [facility]. and Deyvi Ocampo MD. Orthopedic  Surgeon. [facility].      Closed fracture distal radius and ulna, right, initial encounter- (present on admission)  Assessment & Plan  Comminuted dorsally angulated intra-articular distal radius fracture with impaction.    Mildly distracted fracture of the base of the ulnar styloid.  Splinted at referring facility  Reduced in ED with conscious sedation  CT bilateral wrists completed  10/18 ORIF distal radius.  Weight bearing status - Platform weightbearing RUE. ADL's ok with hands under 5 lbs  Te Comer MD. Orthopedic Surgeon. [facility].      Closed fracture of third cervical vertebra, initial encounter (HCC)- (present on admission)  Assessment & Plan  Fracture of the anterior inferior corner of the C3 vertebral body with slight anterior displacement approximately 3 mm.  Equivocal nondisplaced fracture line extending to superior endplate of the vertebral body.  Left C3-4 facet mildly widened with posterior displacement of C3 facet in relation to C4  CTA neck within normal limits  Non-operative management.   Cervical immobilization.   Recommend rigid cervical orthosis. Follow up in clinic 6 weeks with AP / lateral cervical spine xrays   Garrett Edgar MD, PhD. Neurosurgeon. [facility].        Lip laceration- (present on admission)  Assessment & Plan  Repaired in ED with absorbable suture     No contraindication to deep vein thrombosis (DVT) prophylaxis- (present on admission)  Assessment & Plan  Prophylactic anticoagulation for thrombotic prevention initially contraindicated secondary to elevated bleeding risk.  10/14 Prophylactic dose enoxaparin initiated.       Finger dislocation, initial encounter- (present on admission)  Assessment & Plan  Dorsal dislocation of the PIP joint of the long finger without associated fracture.  Reduced at referring facility.     Trauma- (present on admission)  Assessment & Plan  Fall while hiking  Trauma Green Transfer Activation.  Marck  Jagdish Marquez MD. Trauma Surgery.       Discussed patient condition with RN, Patient, and trauma surgery, Dr. Marquez.

## 2022-10-25 NOTE — PROGRESS NOTES
Assumed care of patient at 0645. Bedside report received. Assessment complete.    AA&Ox4. Pt on room air.    Reporting 5/10 pain. Medication given for pain control. Educated patient regarding pharmacologic and non pharmacologic modalities for pain management.    Skin per flow sheets.    Tolerating level 6, soft & bite sized diet. Denies N/V.  + void. Last BM 10/25/2022.    Pt ambulates x SBA.      Plan of care discussed, all questions answered. Educated on the importance of calling before getting OOB and pt verbalizes understanding. Educated regarding importance of oral care. Oral care kit at bedside. Call light is within reach, treaded slipper socks on, bed in lowest/ locked position, hourly rounding in place, all needs met at this time.      Layla Schroeder R.N.

## 2022-10-25 NOTE — CARE PLAN
The patient is Stable - Low risk of patient condition declining or worsening    Shift Goals  Clinical Goals: pain control, increase mobility  Patient Goals: comfort, shower, brush teeth  Family Goals: no family at bedside    Progress made toward(s) clinical / shift goals: Pt OOB for meals and able to ambulate in hallway x SBA. Pt keeps arms elevated with pillows. Medication used for pain control.     Patient is not progressing towards the following goals: N/A

## 2022-10-25 NOTE — PROGRESS NOTES
Received report from previous shift RN.  Assessment complete.  A&O x 4. Patient calls appropriately.  Patient ambulates with standby assist.  Patient denies SOB.  Patient has 2/10 pain. Declines pain interventions at this time.  Denies N&V. Tolerating soft bite sized diet.  BL UE splints, CDI. BL under-eye incisions with dermabond, CDI.  + void via condom catheter, + flatus, last BM 10/24.  Patient pleasant and cooperative with plan of care.  Call light and personal belongings with in reach. Hourly rounding in place. All needs met at this time.

## 2022-10-25 NOTE — DISCHARGE PLANNING
[place] [facility] [place]   Outcome: DPA left a voicemail for Zuleyma in admissions. DPA waiting on a call back for updates.  Zuleyma: [phone]    1103  Agency/Facility Name: [facility] [place]   Spoke To: Jourlanette   Outcome: DPA was notified that Pt is declined due to no Male beds available.   Journey: [phone]    1110  Agency/Facility Name: [facility]   Spoke To:    Outcome: DPA was notified that Admission is in meeting. DPA to call back at a later time.    1120  Agency/Facility Name: [facility]   Outcome: DPA left a voicemail with admissions. DPA waiting on a call back for updates.    1223  Agency/Facility Name: [facility]  Spoke To: Layne Guzmán  Outcome: Pt is declined due to contracted insurance.    1229  Agency/Facility Name: [facility]   Spoke To:    Outcome:DPA left a voicemail wiqth admissions. DPA waiting on a call back for updates.    1415  Agency/Facility Name: [facility]   Spoke To: Lesly  Outcome: Per Lesly, referral has not been received. DPA to manually fax to [phone].

## 2022-10-25 NOTE — THERAPY
"Physical Therapy   Daily Treatment     Patient Name: Leobardo Young  Age:  62 y.o., Sex:  male  Medical Record #: [id number]  Today's Date: 10/25/2022     Precautions  Precautions: Fall Risk;Platform Weight Bearing Right Upper Extremity;Platform Weight Bearing Left Upper Extremity;Spinal / Back Precautions ;Cervical Collar    Comments: c-collar AAT, facial fxs, BUE casts, ADL's up to 5 lbs    Assessment    Pt progressing as expected given injuries. Pt with improved dynamic gait index this session scoring 11/24 (see details in \"other treatments\" below), improved from 9/24 on previous trial. Pt still remains at risk for falls as he is below the cutoff score of 19/24. Pt no longer requiring O2, maintained >94% SpO2 with ambulation. Continue to recommend placement, defer to OT for ADL needs. Will continue to follow to address pt's weakness, impaired balance, activity tolerance and safety with mobility.     Plan    Continue current treatment plan.    DC Equipment Recommendations: Unable to determine at this time  Discharge Recommendations: Recommend post-acute placement for additional physical therapy services prior to discharge home      Subjective    \"I have 3 steps to get to my bed with no rails.\"   \"I can't flush my toilet at home, it requires more than 5LBs of pressure.\"      Objective     10/25/22 1429   Vitals   Pulse Oximetry 94 %   O2 Delivery Device None - Room Air   Pain 0 - 10 Group   Therapist Pain Assessment Post Activity Pain Same as Prior to Activity;Nurse Notified  (not rated, agreeable to mobility)   Cognition    Cognition / Consciousness WDL   Level of Consciousness Alert   Comments Pleasant and cooperative   Active ROM Lower Body    Active ROM Lower Body  WDL   Strength Lower Body   Lower Body Strength  X   Comments grossly 4/5 BLE   Sensation Lower Body   Lower Extremity Sensation   WDL   Other Treatments   Other Treatments Provided Performed Dynamic Gait Index: pt with improved score of 11/24 " (score of <19/24 predictive for risk for falls) from prior score of 9/24. Pt with continued difficulty stepping over/around object without assistance as he is unable to see the item as he approaches. Mild veering with ambulation and turning eyes from L/R and up/down. Pt with continued difficulty picking object off ground, required Radha to stand from squat while maintaining spinal precautions.   Balance   Sitting Balance (Static) Fair +   Sitting Balance (Dynamic) Fair   Standing Balance (Static) Fair -   Standing Balance (Dynamic) Fair -   Weight Shift Sitting Fair   Weight Shift Standing Fair   Skilled Intervention Compensatory Strategies;Verbal Cuing;Tactile Cuing;Sequencing   Comments no AD   Gait Analysis   Gait Level Of Assist Standby Assist   Assistive Device None   Distance (Feet) 150  (limited 2/2 focus on balance assessment)   # of Times Distance was Traveled 1   Deviation Shuffled Gait;Bradykinetic  (guarded posture)   # of Stairs Climbed 6   Level of Assist with Stairs Contact Guard Assist  (step to pattern, guarded posture, no UE support)   Weight Bearing Status BUE PWB   Skilled Intervention Verbal Cuing;Tactile Cuing;Sequencing;Compensatory Strategies   Comments Slowed arash with cognitive dual tasking   Bed Mobility    Supine to Sit Supervised   Sit to Supine Supervised   Scooting Supervised   Rolling Supervised   Skilled Intervention Verbal Cuing   Functional Mobility   Sit to Stand Standby Assist   Bed, Chair, Wheelchair Transfer Standby Assist   Transfer Method Stand Step   Mobility ambulation hallway, stairs, balance testing   Skilled Intervention Verbal Cuing;Tactile Cuing;Facilitation   How much difficulty does the patient currently have...   Turning over in bed (including adjusting bedclothes, sheets and blankets)? 3   Sitting down on and standing up from a chair with arms (e.g., wheelchair, bedside commode, etc.) 3   Moving from lying on back to sitting on the side of the bed? 3   How much  help from another person does the patient currently need...   Moving to and from a bed to a chair (including a wheelchair)? 3   Need to walk in a hospital room? 3   Climbing 3-5 steps with a railing? 3   6 clicks Mobility Score 18   Activity Tolerance   Comments limited by fatigue   Short Term Goals    Short Term Goal # 1 Pt will perform supine <> sit with SPV and HOB flat in 6 visits to get in/out of bed without assistance   Goal Outcome # 1 Goal met   Short Term Goal # 2 Pt will perform STS transfers with SPV in 6 visits to improve functional independence   Goal Outcome # 2 Progressing as expected   Short Term Goal # 3 Pt will ambulate 250ft with SPV in 6 visits to access community distances   Goal Outcome # 3 Progressing as expected   Short Term Goal # 4 Pt will negotiate 12 steps with SPV in 6 visits to safely enter/exit his 2nd story apartment   Goal Outcome # 4 Progressing as expected   Education Group   Education Provided Stair Training;Cervical Precautions   Cervical Precautions Patient Response Patient;Acceptance;Explanation;Demonstration;Action Demonstration;Verbal Demonstration   Stair Training Patient Response Patient;Acceptance;Explanation;Action Demonstration;Reinforcement Needed   Anticipated Discharge Equipment and Recommendations   DC Equipment Recommendations Unable to determine at this time   Discharge Recommendations Recommend post-acute placement for additional physical therapy services prior to discharge home   Interdisciplinary Plan of Care Collaboration   IDT Collaboration with  Nursing;Occupational Therapist   Patient Position at End of Therapy In Bed;Tray Table within Reach;Phone within Reach;Call Light within Reach   Collaboration Comments RN updated   Session Information   Date / Session Number  10/25-3 (1/3, 10/31)

## 2022-10-25 NOTE — CARE PLAN
The patient is Stable - Low risk of patient condition declining or worsening    Shift Goals  Clinical Goals: Comfort  Patient Goals: Comfort, rest    Progress made toward(s) clinical / shift goals:  Patient able to rest comfortably this shift. Repositioning and oral care done for comfort.    Patient is not progressing towards the following goals:

## 2022-10-26 ENCOUNTER — APPOINTMENT (OUTPATIENT)
Dept: RADIOLOGY | Facility: MEDICAL CENTER | Age: 62
DRG: 141 | End: 2022-10-26
Attending: PHYSICIAN ASSISTANT
Payer: COMMERCIAL

## 2022-10-26 LAB
ANION GAP SERPL CALC-SCNC: 13 MMOL/L (ref 7–16)
APPEARANCE UR: CLEAR
BACTERIA #/AREA URNS HPF: ABNORMAL /HPF
BASOPHILS # BLD AUTO: 0.5 % (ref 0–1.8)
BASOPHILS # BLD: 0.08 K/UL (ref 0–0.12)
BILIRUB UR QL STRIP.AUTO: NEGATIVE
BUN SERPL-MCNC: 17 MG/DL (ref 8–22)
CALCIUM SERPL-MCNC: 9.1 MG/DL (ref 8.5–10.5)
CHLORIDE SERPL-SCNC: 100 MMOL/L (ref 96–112)
CO2 SERPL-SCNC: 25 MMOL/L (ref 20–33)
COLOR UR: YELLOW
CREAT SERPL-MCNC: 0.59 MG/DL (ref 0.5–1.4)
EOSINOPHIL # BLD AUTO: 0.12 K/UL (ref 0–0.51)
EOSINOPHIL NFR BLD: 0.8 % (ref 0–6.9)
EPI CELLS #/AREA URNS HPF: NEGATIVE /HPF
ERYTHROCYTE [DISTWIDTH] IN BLOOD BY AUTOMATED COUNT: 41.6 FL (ref 35.9–50)
GFR SERPLBLD CREATININE-BSD FMLA CKD-EPI: 109 ML/MIN/1.73 M 2
GLUCOSE SERPL-MCNC: 130 MG/DL (ref 65–99)
GLUCOSE UR STRIP.AUTO-MCNC: NEGATIVE MG/DL
HCT VFR BLD AUTO: 38.5 % (ref 42–52)
HGB BLD-MCNC: 12.9 G/DL (ref 14–18)
HYALINE CASTS #/AREA URNS LPF: ABNORMAL /LPF
IMM GRANULOCYTES # BLD AUTO: 0.57 K/UL (ref 0–0.11)
IMM GRANULOCYTES NFR BLD AUTO: 3.8 % (ref 0–0.9)
KETONES UR STRIP.AUTO-MCNC: NEGATIVE MG/DL
LEUKOCYTE ESTERASE UR QL STRIP.AUTO: ABNORMAL
LYMPHOCYTES # BLD AUTO: 1.8 K/UL (ref 1–4.8)
LYMPHOCYTES NFR BLD: 12 % (ref 22–41)
MCH RBC QN AUTO: 30 PG (ref 27–33)
MCHC RBC AUTO-ENTMCNC: 33.5 G/DL (ref 33.7–35.3)
MCV RBC AUTO: 89.5 FL (ref 81.4–97.8)
MICRO URNS: ABNORMAL
MONOCYTES # BLD AUTO: 1.12 K/UL (ref 0–0.85)
MONOCYTES NFR BLD AUTO: 7.5 % (ref 0–13.4)
NEUTROPHILS # BLD AUTO: 11.3 K/UL (ref 1.82–7.42)
NEUTROPHILS NFR BLD: 75.4 % (ref 44–72)
NITRITE UR QL STRIP.AUTO: NEGATIVE
NRBC # BLD AUTO: 0 K/UL
NRBC BLD-RTO: 0 /100 WBC
PH UR STRIP.AUTO: 6 [PH] (ref 5–8)
PLATELET # BLD AUTO: 450 K/UL (ref 164–446)
PMV BLD AUTO: 9.4 FL (ref 9–12.9)
POTASSIUM SERPL-SCNC: 4.4 MMOL/L (ref 3.6–5.5)
PROT UR QL STRIP: NEGATIVE MG/DL
RBC # BLD AUTO: 4.3 M/UL (ref 4.7–6.1)
RBC # URNS HPF: ABNORMAL /HPF
RBC UR QL AUTO: NEGATIVE
SODIUM SERPL-SCNC: 138 MMOL/L (ref 135–145)
SP GR UR STRIP.AUTO: 1.01
UROBILINOGEN UR STRIP.AUTO-MCNC: 0.2 MG/DL
WBC # BLD AUTO: 15 K/UL (ref 4.8–10.8)
WBC #/AREA URNS HPF: ABNORMAL /HPF

## 2022-10-26 PROCEDURE — A9270 NON-COVERED ITEM OR SERVICE: HCPCS | Performed by: SURGERY

## 2022-10-26 PROCEDURE — A9270 NON-COVERED ITEM OR SERVICE: HCPCS

## 2022-10-26 PROCEDURE — 80048 BASIC METABOLIC PNL TOTAL CA: CPT

## 2022-10-26 PROCEDURE — 700102 HCHG RX REV CODE 250 W/ 637 OVERRIDE(OP): Performed by: PLASTIC SURGERY

## 2022-10-26 PROCEDURE — 700102 HCHG RX REV CODE 250 W/ 637 OVERRIDE(OP): Performed by: SURGERY

## 2022-10-26 PROCEDURE — 71045 X-RAY EXAM CHEST 1 VIEW: CPT

## 2022-10-26 PROCEDURE — 81001 URINALYSIS AUTO W/SCOPE: CPT

## 2022-10-26 PROCEDURE — 85025 COMPLETE CBC W/AUTO DIFF WBC: CPT

## 2022-10-26 PROCEDURE — A9270 NON-COVERED ITEM OR SERVICE: HCPCS | Performed by: PLASTIC SURGERY

## 2022-10-26 PROCEDURE — 700102 HCHG RX REV CODE 250 W/ 637 OVERRIDE(OP): Performed by: SPECIALIST

## 2022-10-26 PROCEDURE — A9270 NON-COVERED ITEM OR SERVICE: HCPCS | Performed by: SPECIALIST

## 2022-10-26 PROCEDURE — 770001 HCHG ROOM/CARE - MED/SURG/GYN PRIV*

## 2022-10-26 PROCEDURE — 36415 COLL VENOUS BLD VENIPUNCTURE: CPT

## 2022-10-26 PROCEDURE — 700102 HCHG RX REV CODE 250 W/ 637 OVERRIDE(OP)

## 2022-10-26 PROCEDURE — 700111 HCHG RX REV CODE 636 W/ 250 OVERRIDE (IP): Performed by: SURGERY

## 2022-10-26 PROCEDURE — 99233 SBSQ HOSP IP/OBS HIGH 50: CPT | Performed by: PHYSICIAN ASSISTANT

## 2022-10-26 RX ORDER — SULFAMETHOXAZOLE AND TRIMETHOPRIM 400; 80 MG/1; MG/1
1 TABLET ORAL EVERY 12 HOURS
Status: COMPLETED | OUTPATIENT
Start: 2022-10-26 | End: 2022-11-02

## 2022-10-26 RX ADMIN — DOCUSATE SODIUM 100 MG: 100 CAPSULE, LIQUID FILLED ORAL at 05:42

## 2022-10-26 RX ADMIN — Medication 5 MG: at 21:54

## 2022-10-26 RX ADMIN — 0.12% CHLORHEXIDINE GLUCONATE 15 ML: 1.2 RINSE ORAL at 05:43

## 2022-10-26 RX ADMIN — METAXALONE 400 MG: 800 TABLET ORAL at 17:36

## 2022-10-26 RX ADMIN — TRAZODONE HYDROCHLORIDE 50 MG: 50 TABLET ORAL at 21:54

## 2022-10-26 RX ADMIN — POTASSIUM CHLORIDE 40 MEQ: 20 TABLET, EXTENDED RELEASE ORAL at 12:49

## 2022-10-26 RX ADMIN — METAXALONE 400 MG: 800 TABLET ORAL at 12:49

## 2022-10-26 RX ADMIN — OXYCODONE 5 MG: 5 TABLET ORAL at 21:54

## 2022-10-26 RX ADMIN — AMLODIPINE BESYLATE 5 MG: 10 TABLET ORAL at 05:44

## 2022-10-26 RX ADMIN — GABAPENTIN 100 MG: 100 CAPSULE ORAL at 16:05

## 2022-10-26 RX ADMIN — OXYCODONE 5 MG: 5 TABLET ORAL at 08:54

## 2022-10-26 RX ADMIN — OXYCODONE 5 MG: 5 TABLET ORAL at 05:42

## 2022-10-26 RX ADMIN — ACETAMINOPHEN 650 MG: 325 TABLET, FILM COATED ORAL at 19:46

## 2022-10-26 RX ADMIN — SULFAMETHOXAZOLE AND TRIMETHOPRIM 1 TABLET: 400; 80 TABLET ORAL at 22:14

## 2022-10-26 RX ADMIN — POTASSIUM CHLORIDE 40 MEQ: 20 TABLET, EXTENDED RELEASE ORAL at 05:42

## 2022-10-26 RX ADMIN — POLYETHYLENE GLYCOL 3350 1 PACKET: 17 POWDER, FOR SOLUTION ORAL at 05:43

## 2022-10-26 RX ADMIN — POTASSIUM CHLORIDE 40 MEQ: 20 TABLET, EXTENDED RELEASE ORAL at 17:36

## 2022-10-26 RX ADMIN — METAXALONE 400 MG: 800 TABLET ORAL at 05:42

## 2022-10-26 RX ADMIN — ENOXAPARIN SODIUM 30 MG: 30 INJECTION SUBCUTANEOUS at 17:36

## 2022-10-26 RX ADMIN — GABAPENTIN 100 MG: 100 CAPSULE ORAL at 21:54

## 2022-10-26 RX ADMIN — GABAPENTIN 100 MG: 100 CAPSULE ORAL at 05:43

## 2022-10-26 RX ADMIN — FLUTICASONE PROPIONATE 100 MCG: 50 SPRAY, METERED NASAL at 05:44

## 2022-10-26 RX ADMIN — MAGNESIUM HYDROXIDE 30 ML: 400 SUSPENSION ORAL at 05:43

## 2022-10-26 RX ADMIN — OXYCODONE 5 MG: 5 TABLET ORAL at 12:52

## 2022-10-26 RX ADMIN — ENOXAPARIN SODIUM 30 MG: 30 INJECTION SUBCUTANEOUS at 05:42

## 2022-10-26 RX ADMIN — OXYCODONE 5 MG: 5 TABLET ORAL at 17:39

## 2022-10-26 RX ADMIN — 0.12% CHLORHEXIDINE GLUCONATE 15 ML: 1.2 RINSE ORAL at 17:36

## 2022-10-26 RX ADMIN — ACETAMINOPHEN 650 MG: 325 TABLET, FILM COATED ORAL at 01:55

## 2022-10-26 ASSESSMENT — PAIN DESCRIPTION - PAIN TYPE
TYPE: ACUTE PAIN;SURGICAL PAIN

## 2022-10-26 ASSESSMENT — ENCOUNTER SYMPTOMS
SHORTNESS OF BREATH: 0
CHILLS: 0
DIZZINESS: 0
NAUSEA: 0
MYALGIAS: 1
COUGH: 0
NECK PAIN: 1
ROS GI COMMENTS: BM 10/25
FEVER: 0
HEADACHES: 0
VOMITING: 0
ABDOMINAL PAIN: 0

## 2022-10-26 NOTE — THERAPY
"Occupational Therapy  Daily Treatment     Patient Name: Leobardo Young  Age:  62 y.o., Sex:  male  Medical Record #: [id number]  Today's Date: 10/26/2022       Precautions: Fall Risk, Platform Weight Bearing Right Upper Extremity, Platform Weight Bearing Left Upper Extremity, Cervical Collar  , Spinal / Back Precautions   Comments: C-collar on AAT    Assessment    Pt seen for OT tx.  Pt is making progress with his ability to complete basic ADL's, however given his current ROM/restrictions in his bilateral wrists & hands along with the visual impairments/restrictions from the C-collar.  Pt was able to dress in his own clothes today with Min A.  Pt still has difficulty opening containers, door knobs, performing personal hygiene after toileting, tying shoes & along with most other fine motor tasks.  Given these restrictions it would be extremely difficult for pt to return home alone at this time.  Pt would be an excellent Rehab candidate to maximize his independence with ADL's prior to D/C home.  Pt is having a discussion with his brother to see if he could come stay with pt in his home at D/C?     Plan    Continue current treatment plan.    DC Equipment Recommendations: Unable to determine at this time  Discharge Recommendations: Recommend post-acute placement for additional occupational therapy services prior to discharge home    Subjective    \"I am getting more ROM in my fingers, but it's still very difficult to open almost everything\"     Objective       10/25/22 2622   Cognition    Comments pleasant & receptive to new learning   Active ROM Upper Body   Comments Pt is limited in Bilateral hand wrist function given current casting.  Pt does endorse he is having improved finger ROM in both of his hands   Strength Upper Body   Right  Impaired   Left  Impaired   Other Treatments   Other Treatments Provided Long discussion with pt regarding his current limitations in his bilateral hands/wrists & how that will " impact his ability to return home alone.  A lot of emphasis was placed on problem solving how pt would be able to manage alone in his apt if he was to D/C home alone.   Balance   Sitting Balance (Static) Fair +   Sitting Balance (Dynamic) Fair +   Standing Balance (Static) Fair   Standing Balance (Dynamic) Fair   Weight Shift Sitting Good   Weight Shift Standing Good   Bed Mobility    Supine to Sit Supervised   Sit to Supine Supervised   Scooting Supervised   Rolling Supervised   Activities of Daily Living   Eating Minimal Assist  (difficulty opening containers)   Grooming Minimal Assist;Standing   Upper Body Dressing Minimal Assist   Lower Body Dressing Standby Assist   Comments pt able to dress in his own clothes & shoes with help.  Pt had difficulty with underwear due to them being tighter than his shorts.  Tying his shoes were difficult due to his limited visual field from the restrictions of his C-collar   Functional Mobility   Sit to Stand Supervised   Bed, Chair, Wheelchair Transfer Supervised   Toilet Transfers Supervised   Comments pt encouraged to amb in halls daily with staff   Patient / Family Goals   Patient / Family Goal #1 to be more independent   Goal #1 Outcome Progressing as expected   Short Term Goals   Short Term Goal # 1 Pt will don gown w/ supv   Goal Outcome # 1 Progressing as expected   Short Term Goal # 2 Pt will feed self w/ supv and compensatory strategies PRN   Goal Outcome # 2 Progressing as expected   Short Term Goal # 3 Pt will perform toileting w/ min A   Goal Outcome # 3 Progressing as expected   Short Term Goal # 4 Pt will perfom g/h w/ supv and compensatory strategies PRN   Goal Outcome # 4 Progressing as expected

## 2022-10-26 NOTE — PROGRESS NOTES
Received report from previous shift RN.  Assessment complete.  A&O x 4. Patient calls appropriately.  Patient ambulates with standby assist.  Patient denies SOB.  Patient has 5/10 pain. Pain managed per MAR.  Denies N&V. Tolerating soft bite sized diet.  BL UE splints, CDI. BL under-eye incisions with dermabond, ROCKY, CDI.  + void, + flatus, last BM 10/25.  Patient pleasant and cooperative with plan of care.  Call light and personal belongings with in reach. Hourly rounding in place. All needs met at this time.

## 2022-10-26 NOTE — CARE PLAN
The patient is Stable - Low risk of patient condition declining or worsening    Shift Goals  Clinical Goals: pain control, continue to mobilize  Patient Goals: pain control, heal  Family Goals: no family at bedside    Progress made toward(s) clinical / shift goals: Pt OOB and ambulated in hallways this morning. Pt OOB and in chair for meals. Medication used for pain control. Pt states no nausea. Pt elevated upper extremities and ice used to control swelling.     Patient is not progressing towards the following goals: N/A

## 2022-10-26 NOTE — DISCHARGE PLANNING
Agency/Facility Name: [facility]  Spoke To: Layne Guzmán  Outcome: Pt is declined due to care exceeds capacity.

## 2022-10-26 NOTE — PROGRESS NOTES
Assumed care of patient at 0645. Bedside report received. Assessment complete.    AA&Ox4. Denies SOB.  Reporting 5/10 pain. Medication used for pain control. Educated patient regarding pharmacologic and non pharmacologic modalities for pain management.    Skin per flow sheets.    Tolerating soft, bite sized diet. Denies N/V.    + void. Last BM 10/25/2022.    Pt ambulates x SBA.    Plan of care discussed, all questions answered. Educated on the importance of calling before getting OOB and pt verbalizes understanding. Educated regarding importance of oral care. Oral care kit at bedside. Call light is within reach, treaded slipper socks on, bed in lowest/ locked position, hourly rounding in place, all needs met at this time.    Layla Schroeder R.N.

## 2022-10-26 NOTE — DISCHARGE PLANNING
Case Management Discharge Planning    Admission Date: 10/13/2022  GMLOS: 3.3  ALOS: 13    6-Clicks ADL Score: 16  6-Clicks Mobility Score: 18  PT and/or OT Eval ordered: Yes  Post-acute Referrals Ordered: Yes  Post-acute Choice Obtained: Yes  Has referral(s) been sent to post-acute provider:  Yes      Anticipated Discharge Dispo: Discharge Disposition: D/T to SNF with Medicare cert in anticipation of skilled care (03)    DME Needed: No    Action(s) Taken: OTHER. Per DPA, the DENIALS have been received from the following facilities:  - [facility]  - [facility]  - [facility]      LSW called COSME Gonzalez with [facility]. LSW requested a call back to identify additional facilities in [place].      Escalations Completed: Insurance . COSME Gonzalez [facility] [phone].    Medically Clear: Yes    Next Steps: Awaiting a call back from [facility].    Barriers to Discharge: Pending Placement    Is the patient up for discharge tomorrow: No

## 2022-10-26 NOTE — CARE PLAN
The patient is Stable - Low risk of patient condition declining or worsening    Shift Goals  Clinical Goals: Pain control, comfort  Patient Goals: Pain control, rest  Family Goals: HERIBERTO    Progress made toward(s) clinical / shift goals:  Pain managed with prescribed medications, repositioning, and rest.    Patient is not progressing towards the following goals:

## 2022-10-26 NOTE — PROGRESS NOTES
Trauma / Surgical Daily Progress Note    Date of Service  10/26/2022    Chief Complaint  62 y.o. male admitted 10/13/2022 with Trauma, hiking and fall, spinal fracture, facial fxs, bilateral wrists fxs    10/17 ORIF intraarticular distal radius fracture. ORIF left intraarticular distal radius fracture. ORIF ulnar neck fracture.     10/18 ORIF bilateral Le Fort fractures through multiple surgical approaches including interdental fixation.    Interval Events  Patient sitting up in chair at bedside.   WBC 15.1. Patient non-toxic and afebrile.     - CXR and UA pending.   - Mobilize with therapies.   - Discharge planning.     Review of Systems  Review of Systems   Constitutional:  Negative for chills and fever.   Respiratory:  Negative for cough and shortness of breath.    Cardiovascular:  Negative for chest pain.   Gastrointestinal:  Negative for abdominal pain, nausea and vomiting.        BM 10/25   Genitourinary:         Voiding   Musculoskeletal:  Positive for myalgias and neck pain.        Bilateral wrist pain   Neurological:  Negative for dizziness and headaches.      Vital Signs  Temp:  [36.3 °C (97.4 °F)-36.8 °C (98.2 °F)] 36.5 °C (97.7 °F)  Pulse:  [] 91  Resp:  [17-18] 17  BP: (135-153)/(76-90) 135/76  SpO2:  [93 %-97 %] 94 %    Physical Exam  Physical Exam  Vitals and nursing note reviewed.   Constitutional:       Appearance: Normal appearance.      Interventions: Cervical collar in place.   HENT:      Head: Normocephalic.      Comments: Healing ecchymosis and abrasions/lacerations over face     Mouth/Throat:      Mouth: Mucous membranes are dry.   Eyes:      Extraocular Movements: Extraocular movements intact.      Pupils: Pupils are equal, round, and reactive to light.   Cardiovascular:      Rate and Rhythm: Normal rate and regular rhythm.      Heart sounds: Normal heart sounds.   Pulmonary:      Effort: Pulmonary effort is normal. No respiratory distress.      Breath sounds: Normal breath sounds.    Abdominal:      General: There is no distension.      Palpations: Abdomen is soft.      Tenderness: There is no abdominal tenderness.   Genitourinary:     Comments: Condom catheter in place  Musculoskeletal:      Comments: Moves all extremities   BUE long arm splints   Skin:     General: Skin is warm and dry.   Neurological:      Mental Status: He is alert and oriented to person, place, and time.       Laboratory  Recent Results (from the past 24 hour(s))   CBC with Differential: Tomorrow AM    Collection Time: 10/25/22  3:18 PM   Result Value Ref Range    WBC 17.9 (H) 4.8 - 10.8 K/uL    RBC 4.21 (L) 4.70 - 6.10 M/uL    Hemoglobin 12.4 (L) 14.0 - 18.0 g/dL    Hematocrit 37.0 (L) 42.0 - 52.0 %    MCV 87.9 81.4 - 97.8 fL    MCH 29.5 27.0 - 33.0 pg    MCHC 33.5 (L) 33.7 - 35.3 g/dL    RDW 40.6 35.9 - 50.0 fL    Platelet Count 416 164 - 446 K/uL    MPV 9.1 9.0 - 12.9 fL    Neutrophils-Polys 77.50 (H) 44.00 - 72.00 %    Lymphocytes 10.00 (L) 22.00 - 41.00 %    Monocytes 7.50 0.00 - 13.40 %    Eosinophils 0.80 0.00 - 6.90 %    Basophils 0.40 0.00 - 1.80 %    Immature Granulocytes 3.80 (H) 0.00 - 0.90 %    Nucleated RBC 0.00 /100 WBC    Neutrophils (Absolute) 13.89 (H) 1.82 - 7.42 K/uL    Lymphs (Absolute) 1.79 1.00 - 4.80 K/uL    Monos (Absolute) 1.35 (H) 0.00 - 0.85 K/uL    Eos (Absolute) 0.14 0.00 - 0.51 K/uL    Baso (Absolute) 0.08 0.00 - 0.12 K/uL    Immature Granulocytes (abs) 0.69 (H) 0.00 - 0.11 K/uL    NRBC (Absolute) 0.00 K/uL   CBC with Differential: Tomorrow AM    Collection Time: 10/26/22 10:12 AM   Result Value Ref Range    WBC 15.0 (H) 4.8 - 10.8 K/uL    RBC 4.30 (L) 4.70 - 6.10 M/uL    Hemoglobin 12.9 (L) 14.0 - 18.0 g/dL    Hematocrit 38.5 (L) 42.0 - 52.0 %    MCV 89.5 81.4 - 97.8 fL    MCH 30.0 27.0 - 33.0 pg    MCHC 33.5 (L) 33.7 - 35.3 g/dL    RDW 41.6 35.9 - 50.0 fL    Platelet Count 450 (H) 164 - 446 K/uL    MPV 9.4 9.0 - 12.9 fL    Neutrophils-Polys 75.40 (H) 44.00 - 72.00 %    Lymphocytes 12.00  (L) 22.00 - 41.00 %    Monocytes 7.50 0.00 - 13.40 %    Eosinophils 0.80 0.00 - 6.90 %    Basophils 0.50 0.00 - 1.80 %    Immature Granulocytes 3.80 (H) 0.00 - 0.90 %    Nucleated RBC 0.00 /100 WBC    Neutrophils (Absolute) 11.30 (H) 1.82 - 7.42 K/uL    Lymphs (Absolute) 1.80 1.00 - 4.80 K/uL    Monos (Absolute) 1.12 (H) 0.00 - 0.85 K/uL    Eos (Absolute) 0.12 0.00 - 0.51 K/uL    Baso (Absolute) 0.08 0.00 - 0.12 K/uL    Immature Granulocytes (abs) 0.57 (H) 0.00 - 0.11 K/uL    NRBC (Absolute) 0.00 K/uL   Basic Metabolic Panel (BMP): Tomorrow AM    Collection Time: 10/26/22 10:12 AM   Result Value Ref Range    Sodium 138 135 - 145 mmol/L    Potassium 4.4 3.6 - 5.5 mmol/L    Chloride 100 96 - 112 mmol/L    Co2 25 20 - 33 mmol/L    Glucose 130 (H) 65 - 99 mg/dL    Bun 17 8 - 22 mg/dL    Creatinine 0.59 0.50 - 1.40 mg/dL    Calcium 9.1 8.5 - 10.5 mg/dL    Anion Gap 13.0 7.0 - 16.0   ESTIMATED GFR    Collection Time: 10/26/22 10:12 AM   Result Value Ref Range    GFR (CKD-EPI) 109 >60 mL/min/1.73 m 2       Fluids    Intake/Output Summary (Last 24 hours) at 10/26/2022 1332  Last data filed at 10/26/2022 0700  Gross per 24 hour   Intake --   Output 1350 ml   Net -1350 ml       Core Measures & Quality Metrics  Medications reviewed, Labs reviewed and Radiology images reviewed  Barajas catheter: No Barajas      DVT Prophylaxis: Enoxaparin (Lovenox)  DVT prophylaxis - mechanical: SCDs  Ulcer prophylaxis: Not indicated    Assessed for rehab: Patient was assess for and/or received rehabilitation services during this hospitalization  RAP Score Total: 5  CAGE Results: negative Blood Alcohol>0.08: no     Assessment/Plan  Leukocytosis- (present on admission)  Assessment & Plan  10/24 WBC 13.1. Afebrile. Nontoxic appearance.  - CXR stable. Duplex negative for DVT.  10/26 WBC 15.  - CXR and UA pending.   Trend serial lab studies.     Discharge planning issues- (present on admission)  Assessment & Plan  Date of admission: 10/13/2022.  10/20  Case [place].  Cleared for discharge: Yes - Date: 10/25.  Discharge delayed: Yes - Reason: Non-availability of Transfer Facility.  Discharge date: tbd.    Multiple facial fractures, closed, initial encounter (HCC)- (present on admission)  Assessment & Plan  Fractures of the anterior, medial and superior maxillary sinuses bilaterally, fracture of the right lateral maxillary sinus, bilateral nasal fractures.  Comminuted frontal sinus fracture which appears to involve superior orbital walls bilaterally.    Comminuted displaced fracture of the right superior medial orbit, no retro-orbital hematoma.  Bilateral pterygoid plate fractures.  LeForte III type fracture.  10/18 ORIF bilateral Le Fort fractures through multiple surgical approaches including interdental fixation.  Huey Ahuja MD. Plastic Surgeon. [facility].    Traumatic closed displaced fracture of distal end of radius and ulna, left, initial encounter- (present on admission)  Assessment & Plan  Comminuted intra-articular distal radius fracture on left.    Significant displacement of large radial sided fracture fragment.  Comminuted fracture of distal ulna.  Splinted at referring facility.  Reduced in ED with conscious sedation  CT bilateral wrists completed  10/17 ORIF distal radius.  Weight bearing status - Platform weightbearing LUE. ADL's ok with hands under 5 lbs  Te Comer MD. Orthopedic Surgeon. [facility]. and Deyvi Ocampo MD. Orthopedic Surgeon. [facility].      Closed fracture distal radius and ulna, right, initial encounter- (present on admission)  Assessment & Plan  Comminuted dorsally angulated intra-articular distal radius fracture with impaction.    Mildly distracted fracture of the base of the ulnar styloid.  Splinted at referring facility.  Reduced in ED with conscious sedation.  CT bilateral wrists completed.  10/18 ORIF distal radius.  Weight bearing  status - Platform weightbearing RUE. ADL's ok with hands under 5 lbs.  Te Comer MD. Orthopedic Surgeon. [facility].      Closed fracture of third cervical vertebra, initial encounter ([facility])- (present on admission)  Assessment & Plan  Fracture of the anterior inferior corner of the C3 vertebral body with slight anterior displacement approximately 3 mm.  Equivocal nondisplaced fracture line extending to superior endplate of the vertebral body.  Left C3-4 facet mildly widened with posterior displacement of C3 facet in relation to C4  CTA neck within normal limits  Non-operative management.   Cervical immobilization.   Follow up in clinic 6 weeks with AP / lateral cervical spine xrays.  Garrett Edgar MD, PhD. Neurosurgeon. [facility].        Lip laceration- (present on admission)  Assessment & Plan  Repaired in ED with absorbable sutures.    No contraindication to deep vein thrombosis (DVT) prophylaxis- (present on admission)  Assessment & Plan  Prophylactic anticoagulation for thrombotic prevention initially contraindicated secondary to elevated bleeding risk.  10/14 Prophylactic dose enoxaparin initiated.       Finger dislocation, initial encounter- (present on admission)  Assessment & Plan  Dorsal dislocation of the PIP joint of the long finger without associated fracture.  Reduced at referring facility.     Trauma- (present on admission)  Assessment & Plan  Fall while hiking  Trauma Green Transfer Activation.  Marck Marquez MD. Trauma Surgery.     Discussed patient condition with RN, , , Patient, and trauma surgery. Dr. STEVE Marquez.

## 2022-10-27 LAB
BASOPHILS # BLD AUTO: 0.8 % (ref 0–1.8)
BASOPHILS # BLD: 0.08 K/UL (ref 0–0.12)
EOSINOPHIL # BLD AUTO: 0.18 K/UL (ref 0–0.51)
EOSINOPHIL NFR BLD: 1.7 % (ref 0–6.9)
ERYTHROCYTE [DISTWIDTH] IN BLOOD BY AUTOMATED COUNT: 41.8 FL (ref 35.9–50)
HCT VFR BLD AUTO: 35 % (ref 42–52)
HGB BLD-MCNC: 11.7 G/DL (ref 14–18)
LYMPHOCYTES # BLD AUTO: 1.63 K/UL (ref 1–4.8)
LYMPHOCYTES NFR BLD: 15.4 % (ref 22–41)
MANUAL DIFF BLD: NORMAL
MCH RBC QN AUTO: 30.1 PG (ref 27–33)
MCHC RBC AUTO-ENTMCNC: 33.4 G/DL (ref 33.7–35.3)
MCV RBC AUTO: 90 FL (ref 81.4–97.8)
METAMYELOCYTES NFR BLD MANUAL: 0.9 %
MONOCYTES # BLD AUTO: 0.9 K/UL (ref 0–0.85)
MONOCYTES NFR BLD AUTO: 8.5 % (ref 0–13.4)
MORPHOLOGY BLD-IMP: NORMAL
MYELOCYTES NFR BLD MANUAL: 2.6 %
NEUTROPHILS # BLD AUTO: 7.43 K/UL (ref 1.82–7.42)
NEUTROPHILS NFR BLD: 70.1 % (ref 44–72)
NRBC # BLD AUTO: 0 K/UL
NRBC BLD-RTO: 0 /100 WBC
PLATELET # BLD AUTO: 368 K/UL (ref 164–446)
PLATELET BLD QL SMEAR: NORMAL
PMV BLD AUTO: 9.2 FL (ref 9–12.9)
RBC # BLD AUTO: 3.89 M/UL (ref 4.7–6.1)
RBC BLD AUTO: NORMAL
WBC # BLD AUTO: 10.6 K/UL (ref 4.8–10.8)

## 2022-10-27 PROCEDURE — 99232 SBSQ HOSP IP/OBS MODERATE 35: CPT | Performed by: PHYSICIAN ASSISTANT

## 2022-10-27 PROCEDURE — 85007 BL SMEAR W/DIFF WBC COUNT: CPT

## 2022-10-27 PROCEDURE — 36415 COLL VENOUS BLD VENIPUNCTURE: CPT

## 2022-10-27 PROCEDURE — 85025 COMPLETE CBC W/AUTO DIFF WBC: CPT

## 2022-10-27 PROCEDURE — 770001 HCHG ROOM/CARE - MED/SURG/GYN PRIV*

## 2022-10-27 PROCEDURE — A9270 NON-COVERED ITEM OR SERVICE: HCPCS | Performed by: SURGERY

## 2022-10-27 PROCEDURE — 700102 HCHG RX REV CODE 250 W/ 637 OVERRIDE(OP): Performed by: SURGERY

## 2022-10-27 PROCEDURE — 700102 HCHG RX REV CODE 250 W/ 637 OVERRIDE(OP): Performed by: PLASTIC SURGERY

## 2022-10-27 PROCEDURE — 700102 HCHG RX REV CODE 250 W/ 637 OVERRIDE(OP)

## 2022-10-27 PROCEDURE — 700102 HCHG RX REV CODE 250 W/ 637 OVERRIDE(OP): Performed by: SPECIALIST

## 2022-10-27 PROCEDURE — A9270 NON-COVERED ITEM OR SERVICE: HCPCS

## 2022-10-27 PROCEDURE — 700111 HCHG RX REV CODE 636 W/ 250 OVERRIDE (IP): Performed by: SURGERY

## 2022-10-27 PROCEDURE — A9270 NON-COVERED ITEM OR SERVICE: HCPCS | Performed by: SPECIALIST

## 2022-10-27 PROCEDURE — A9270 NON-COVERED ITEM OR SERVICE: HCPCS | Performed by: PLASTIC SURGERY

## 2022-10-27 RX ADMIN — POTASSIUM CHLORIDE 40 MEQ: 20 TABLET, EXTENDED RELEASE ORAL at 16:36

## 2022-10-27 RX ADMIN — 0.12% CHLORHEXIDINE GLUCONATE 15 ML: 1.2 RINSE ORAL at 05:26

## 2022-10-27 RX ADMIN — POTASSIUM CHLORIDE 40 MEQ: 20 TABLET, EXTENDED RELEASE ORAL at 11:39

## 2022-10-27 RX ADMIN — TRAZODONE HYDROCHLORIDE 50 MG: 50 TABLET ORAL at 21:09

## 2022-10-27 RX ADMIN — 0.12% CHLORHEXIDINE GLUCONATE 15 ML: 1.2 RINSE ORAL at 16:35

## 2022-10-27 RX ADMIN — METAXALONE 400 MG: 800 TABLET ORAL at 11:39

## 2022-10-27 RX ADMIN — OXYCODONE HYDROCHLORIDE 10 MG: 10 TABLET ORAL at 08:16

## 2022-10-27 RX ADMIN — GABAPENTIN 100 MG: 100 CAPSULE ORAL at 21:09

## 2022-10-27 RX ADMIN — Medication 5 MG: at 21:09

## 2022-10-27 RX ADMIN — FLUTICASONE PROPIONATE 100 MCG: 50 SPRAY, METERED NASAL at 05:27

## 2022-10-27 RX ADMIN — GABAPENTIN 100 MG: 100 CAPSULE ORAL at 13:44

## 2022-10-27 RX ADMIN — SULFAMETHOXAZOLE AND TRIMETHOPRIM 1 TABLET: 400; 80 TABLET ORAL at 16:44

## 2022-10-27 RX ADMIN — GABAPENTIN 100 MG: 100 CAPSULE ORAL at 05:26

## 2022-10-27 RX ADMIN — METAXALONE 400 MG: 800 TABLET ORAL at 05:27

## 2022-10-27 RX ADMIN — OXYCODONE 5 MG: 5 TABLET ORAL at 16:44

## 2022-10-27 RX ADMIN — ENOXAPARIN SODIUM 30 MG: 30 INJECTION SUBCUTANEOUS at 05:26

## 2022-10-27 RX ADMIN — SULFAMETHOXAZOLE AND TRIMETHOPRIM 1 TABLET: 400; 80 TABLET ORAL at 05:27

## 2022-10-27 RX ADMIN — ACETAMINOPHEN 650 MG: 325 TABLET, FILM COATED ORAL at 11:39

## 2022-10-27 RX ADMIN — OXYCODONE 5 MG: 5 TABLET ORAL at 13:44

## 2022-10-27 RX ADMIN — OXYCODONE 5 MG: 5 TABLET ORAL at 21:09

## 2022-10-27 RX ADMIN — ACETAMINOPHEN 650 MG: 325 TABLET, FILM COATED ORAL at 05:34

## 2022-10-27 RX ADMIN — AMLODIPINE BESYLATE 5 MG: 10 TABLET ORAL at 05:27

## 2022-10-27 RX ADMIN — POTASSIUM CHLORIDE 40 MEQ: 20 TABLET, EXTENDED RELEASE ORAL at 05:27

## 2022-10-27 RX ADMIN — OXYCODONE 5 MG: 5 TABLET ORAL at 03:12

## 2022-10-27 RX ADMIN — ENOXAPARIN SODIUM 30 MG: 30 INJECTION SUBCUTANEOUS at 16:35

## 2022-10-27 RX ADMIN — METAXALONE 400 MG: 800 TABLET ORAL at 16:36

## 2022-10-27 ASSESSMENT — ENCOUNTER SYMPTOMS
MYALGIAS: 1
DIZZINESS: 0
VOMITING: 0
ABDOMINAL PAIN: 0
COUGH: 0
NAUSEA: 0
ROS GI COMMENTS: BM 10/26
CHILLS: 0
HEADACHES: 0
SHORTNESS OF BREATH: 0
FEVER: 0
NECK PAIN: 1

## 2022-10-27 ASSESSMENT — PAIN DESCRIPTION - PAIN TYPE
TYPE: ACUTE PAIN

## 2022-10-27 NOTE — THERAPY
10/27/22 1613   Interdisciplinary Plan of Care Collaboration   Collaboration Comments OT tx attempted. Pt in bed and reporting feeling very fatigued. Pt reported improved ability to feed self today, with increased time. Pt reported willingness to work w/ OT tomorrow after he has gotten some rest. RN notified. Will follow up and complete OT tx as appropriate/able.

## 2022-10-27 NOTE — DISCHARGE PLANNING
Received Choice form at 1107  Agency/Facility Name: [facility] [facility]   Referral sent per Choice form @ 1113      1140  Agency/Facility Name: [facility]   Spoke To: Eloisa   Outcome: Per Eloisa, SNF declined Pt on TIFFANIE because they cannot accept Medical.    1144  Agency/Facility Name: [facility]   Outcome: DPA left a voicemail regarding referral. DPA waiting on a call back for updates.    1211  Agency/Facility Name: [facility]   Spoke To: Petra   Outcome: SNF referral is currently under review. DPA waiting on a call back for updates.    1250  Agency/Facility Name: [facility]   Outcome: DPA left a voicemail regarding referral. DPA waiting on a call back for updates.     1300  Agency/Facility Name: [facility]   Outcome: DPA left a voicemail regarding referral. DPA waiting on a call back for updates.    1413  Agency/Facility Name: [facility]   Spoke To: Danny   Outcome: Per Danny, SNF can accept Pt on a TIFFANIE.     LSW notified    1537  Agency/Facility Name: [facility]   Spoke To: Petra   Outcome: Per Petra, SNF cannot accept PT on TIFFANIE.

## 2022-10-27 NOTE — PROGRESS NOTES
Trauma / Surgical Daily Progress Note    Date of Service  10/27/2022    Chief Complaint  62 y.o. male admitted 10/13/2022 with Trauma, hiking and fall, spinal fracture, facial fxs, bilateral wrists fxs    10/17 ORIF intraarticular distal radius fracture. ORIF left intraarticular distal radius fracture. ORIF ulnar neck fracture.     10/18 ORIF bilateral Le Fort fractures through multiple surgical approaches including interdental fixation.    Interval Events  WBC normalized.   Patient has not complaints.    - Patient remains medically cleared for discharge to post acute services.   - Awaiting placement at SNF vs [facility].     Review of Systems  Review of Systems   Constitutional:  Negative for chills and fever.   Respiratory:  Negative for cough and shortness of breath.    Cardiovascular:  Negative for chest pain.   Gastrointestinal:  Negative for abdominal pain, nausea and vomiting.        BM 10/26   Genitourinary:         Voiding   Musculoskeletal:  Positive for myalgias and neck pain.        Bilateral wrist pain   Neurological:  Negative for dizziness and headaches.      Vital Signs  Temp:  [35.9 °C (96.6 °F)-36.5 °C (97.7 °F)] 36.1 °C (96.9 °F)  Pulse:  [] 73  Resp:  [17-20] 19  BP: (127-152)/(74-95) 127/74  SpO2:  [94 %-97 %] 97 %    Physical Exam  Physical Exam  Vitals and nursing note reviewed.   Constitutional:       Appearance: Normal appearance.      Interventions: Cervical collar in place.   HENT:      Head: Normocephalic.      Comments: Healing ecchymosis and abrasions/lacerations over face     Mouth/Throat:      Mouth: Mucous membranes are dry.   Eyes:      Extraocular Movements: Extraocular movements intact.      Pupils: Pupils are equal, round, and reactive to light.   Cardiovascular:      Rate and Rhythm: Normal rate and regular rhythm.      Heart sounds: Normal heart sounds.   Pulmonary:      Effort: Pulmonary effort is normal. No respiratory distress.      Breath sounds: Normal breath sounds.    Abdominal:      General: There is no distension.      Palpations: Abdomen is soft.      Tenderness: There is no abdominal tenderness.   Genitourinary:     Comments: Condom catheter in place  Musculoskeletal:      Comments: Moves all extremities   BUE long arm splints   Skin:     General: Skin is warm and dry.   Neurological:      Mental Status: He is alert and oriented to person, place, and time.       Laboratory  Recent Results (from the past 24 hour(s))   URINALYSIS    Collection Time: 10/26/22  5:27 PM    Specimen: Urine, Cath   Result Value Ref Range    Color Yellow     Character Clear     Specific Gravity 1.015 <1.035    Ph 6.0 5.0 - 8.0    Glucose Negative Negative mg/dL    Ketones Negative Negative mg/dL    Protein Negative Negative mg/dL    Bilirubin Negative Negative    Urobilinogen, Urine 0.2 Negative    Nitrite Negative Negative    Leukocyte Esterase Small (A) Negative    Occult Blood Negative Negative    Micro Urine Req Microscopic    URINE MICROSCOPIC (W/UA)    Collection Time: 10/26/22  5:27 PM   Result Value Ref Range    -150 (A) /hpf    RBC 0-2 (A) /hpf    Bacteria Moderate (A) None /hpf    Epithelial Cells Negative /hpf    Hyaline Cast 0-2 /lpf   CBC with Differential: Tomorrow AM    Collection Time: 10/27/22  5:19 AM   Result Value Ref Range    WBC 10.6 4.8 - 10.8 K/uL    RBC 3.89 (L) 4.70 - 6.10 M/uL    Hemoglobin 11.7 (L) 14.0 - 18.0 g/dL    Hematocrit 35.0 (L) 42.0 - 52.0 %    MCV 90.0 81.4 - 97.8 fL    MCH 30.1 27.0 - 33.0 pg    MCHC 33.4 (L) 33.7 - 35.3 g/dL    RDW 41.8 35.9 - 50.0 fL    Platelet Count 368 164 - 446 K/uL    MPV 9.2 9.0 - 12.9 fL    Neutrophils-Polys 70.10 44.00 - 72.00 %    Lymphocytes 15.40 (L) 22.00 - 41.00 %    Monocytes 8.50 0.00 - 13.40 %    Eosinophils 1.70 0.00 - 6.90 %    Basophils 0.80 0.00 - 1.80 %    Nucleated RBC 0.00 /100 WBC    Neutrophils (Absolute) 7.43 (H) 1.82 - 7.42 K/uL    Lymphs (Absolute) 1.63 1.00 - 4.80 K/uL    Monos (Absolute) 0.90 (H) 0.00 -  0.85 K/uL    Eos (Absolute) 0.18 0.00 - 0.51 K/uL    Baso (Absolute) 0.08 0.00 - 0.12 K/uL    NRBC (Absolute) 0.00 K/uL   DIFFERENTIAL MANUAL    Collection Time: 10/27/22  5:19 AM   Result Value Ref Range    Metamyelocytes 0.90 %    Myelocytes 2.60 %    Manual Diff Status PERFORMED    PERIPHERAL SMEAR REVIEW    Collection Time: 10/27/22  5:19 AM   Result Value Ref Range    Peripheral Smear Review see below    PLATELET ESTIMATE    Collection Time: 10/27/22  5:19 AM   Result Value Ref Range    Plt Estimation Normal    MORPHOLOGY    Collection Time: 10/27/22  5:19 AM   Result Value Ref Range    RBC Morphology Normal        Fluids    Intake/Output Summary (Last 24 hours) at 10/27/2022 1100  Last data filed at 10/27/2022 0425  Gross per 24 hour   Intake --   Output 1100 ml   Net -1100 ml       Core Measures & Quality Metrics  Medications reviewed, Labs reviewed and Radiology images reviewed  Barajas catheter: No Barajas      DVT Prophylaxis: Enoxaparin (Lovenox)  DVT prophylaxis - mechanical: SCDs  Ulcer prophylaxis: Not indicated    Assessed for rehab: Patient was assess for and/or received rehabilitation services during this hospitalization  RAP Score Total: 5  CAGE Results: negative Blood Alcohol>0.08: no     Assessment/Plan  Discharge planning issues- (present on admission)  Assessment & Plan  Date of admission: 10/13/2022.  10/18 PM&R consulted, patient declined due to [employer] insurance.   10/20 [place]  Cleared for discharge: Yes - Date: 10/25.  Discharge delayed: Yes - Reason: Non-availability of Transfer Facility.  Discharge date: tbd.    Leukocytosis- (present on admission)  Assessment & Plan  10/24 WBC 13.1. Afebrile. Nontoxic appearance.  - CXR stable. Duplex negative for DVT.  10/26 WBC 15.  - CXR negative   UA positive - initiate Septra DS x 7 days    Traumatic closed displaced fracture of distal end of radius and ulna, left, initial encounter- (present on  admission)  Assessment & Plan  Comminuted intra-articular distal radius fracture on left.    Significant displacement of large radial sided fracture fragment.  Comminuted fracture of distal ulna.  Splinted at referring facility.  Reduced in ED with conscious sedation  CT bilateral wrists completed  10/17 ORIF distal radius.  Weight bearing status - Platform weightbearing LUE. ADL's ok with hands under 5 lbs  Te Comer MD. Orthopedic Surgeon. [facility]. and Deyvi Ocampo MD. Orthopedic Surgeon. [facility].      Closed fracture distal radius and ulna, right, initial encounter- (present on admission)  Assessment & Plan  Comminuted dorsally angulated intra-articular distal radius fracture with impaction.    Mildly distracted fracture of the base of the ulnar styloid.  Splinted at referring facility.  Reduced in ED with conscious sedation.  CT bilateral wrists completed.  10/18 ORIF distal radius.  Weight bearing status - Platform weightbearing RUE. ADL's ok with hands under 5 lbs.  Te Comer MD. Orthopedic Surgeon. [facility].      Closed fracture of third cervical vertebra, initial encounter (HCC)- (present on admission)  Assessment & Plan  Fracture of the anterior inferior corner of the C3 vertebral body with slight anterior displacement approximately 3 mm.  Equivocal nondisplaced fracture line extending to superior endplate of the vertebral body.  Left C3-4 facet mildly widened with posterior displacement of C3 facet in relation to C4  CTA neck within normal limits  Non-operative management.   Cervical immobilization.   Follow up in clinic 6 weeks with AP / lateral cervical spine xrays.  Garrett Edgar MD, PhD. Neurosurgeon. [facility].        Multiple facial fractures, closed, initial encounter (HCC)- (present on admission)  Assessment & Plan  Fractures of the anterior, medial and superior maxillary sinuses bilaterally, fracture of the right lateral maxillary  sinus, bilateral nasal fractures.  Comminuted frontal sinus fracture which appears to involve superior orbital walls bilaterally.    Comminuted displaced fracture of the right superior medial orbit, no retro-orbital hematoma.  Bilateral pterygoid plate fractures.  LeForte III type fracture.  10/18 ORIF bilateral Le Fort fractures through multiple surgical approaches including interdental fixation.  uHey Ahuja MD. Plastic Surgeon. [facility].    Lip laceration- (present on admission)  Assessment & Plan  Repaired in ED with absorbable sutures.    No contraindication to deep vein thrombosis (DVT) prophylaxis- (present on admission)  Assessment & Plan  Prophylactic anticoagulation for thrombotic prevention initially contraindicated secondary to elevated bleeding risk.  10/14 Prophylactic dose enoxaparin initiated.       Finger dislocation, initial encounter- (present on admission)  Assessment & Plan  Dorsal dislocation of the PIP joint of the long finger without associated fracture.  Reduced at referring facility.     Trauma- (present on admission)  Assessment & Plan  Fall while hiking  Trauma Green Transfer Activation.  Marck Marquez MD. Trauma Surgery.     Discussed patient condition with RN, Therapies, Patient, and trauma surgery. Dr. STEVE Marquez.

## 2022-10-27 NOTE — CARE PLAN
The patient is Stable - Low risk of patient condition declining or worsening    Shift Goals  Clinical Goals: pain control, increase mobility, pulmoary hygiene  Patient Goals: pain control, heal  Family Goals: no family at bedside    Progress made toward(s) clinical / shift goals: Medication given this morning for pain control. Pt OOB to chair and walks halls occasionally. BUE elevated.      Patient is not progressing towards the following goals: N/A

## 2022-10-27 NOTE — THERAPY
Physical Therapy Contact Note    Patient Name: Leobardo Young  Age:  62 y.o., Sex:  male  Medical Record #: [id number]  Today's Date: 10/27/2022    PT session attempted this AM. Pt declining as getting his condom catheter removed and wishing to sit in the chair and open his packages. Per pt and RN, pt continues to mobility daily in hallway with RN staff. Will re-attempt as appropriate.    Dilan Weller PT, DPT

## 2022-10-27 NOTE — PROGRESS NOTES
Assumed care of patient at 0645. Bedside report received. Assessment complete.    AA&Ox4. Denies SOB.    Reporting 7/10 pain. Medication used for pain control.Educated patient regarding pharmacologic and non pharmacologic modalities for pain management.    Skin per flow sheets.    Tolerating level 6 soft, bite sized diet. Denies N/V.  + void. Last BM 10/26/2022.    Pt ambulates x SBA.    Plan of care discussed, all questions answered. Educated on the importance of calling before getting OOB and pt verbalizes understanding. Educated regarding importance of oral care. Oral care kit at bedside. Call light is within reach, treaded slipper socks on, bed in lowest/ locked position, hourly rounding in place, all needs met at this time.    Layla Schroeder R.N.

## 2022-10-27 NOTE — DISCHARGE PLANNING
Case Management Discharge Planning    Admission Date: 10/13/2022  GMLOS: 3.3  ALOS: 14    6-Clicks ADL Score: 16  6-Clicks Mobility Score: 18  PT and/or OT Eval ordered: Yes  Post-acute Referrals Ordered: Yes  Post-acute Choice Obtained: Yes  Has referral(s) been sent to post-acute provider:  Yes      Anticipated Discharge Dispo: Discharge Disposition: D/T to SNF with Medicare cert in anticipation of skilled care (03)    DME Needed: No    Action(s) Taken: OTHER. Discussed this case with KIM Gerard. Per Rajani, the most recent recommendation from PTOT is post acute placement before returning home.    LSW spoke with Ruthie  Coordinator with [employer] [phone]. Per Ruthie, she has reached out to the facilities list below and is waiting for an update.    - [facility]  - [facility]  - [facility]    Per Ruthie, if the in - network facilities declined, [facility] is able to do a TIFFANIE with a local facility.     LSW met with patient and provided the above information. Patient agreed to send out the referral to the local facilities. SNF choice form signed by patient and faxed to [facility].    Ruthie requested confirmation that patient remains appropriate for SNF, LSW notified Rajani via Vo[employer].    3:00pm - Per ARGELIA, [facility] is able to accept a TIFFANIE from [employer], at a rate of $600.00 for 30 days.    LSW spoke with Ruthie. Per Ruthie, [facility] in [place], [place] ACCEPTED. Ruthie requested LSW follows up with Chelsie for information regarding bed availability [phone].     Per Ruthie, transportation from [place] to [place] will be arranged though [employer] [phone]. LSW called and left a message for MD Chelsie aware.    Escalations Completed: None    Medically Clear: Yes    Next Steps: Awaiting update from Jasson with [facility].     Barriers to Discharge: Pending Placement    Is the patient up for discharge tomorrow:  TBD

## 2022-10-28 LAB
ANION GAP SERPL CALC-SCNC: 9 MMOL/L (ref 7–16)
BUN SERPL-MCNC: 12 MG/DL (ref 8–22)
CALCIUM SERPL-MCNC: 8.9 MG/DL (ref 8.5–10.5)
CHLORIDE SERPL-SCNC: 98 MMOL/L (ref 96–112)
CO2 SERPL-SCNC: 26 MMOL/L (ref 20–33)
CREAT SERPL-MCNC: 0.56 MG/DL (ref 0.5–1.4)
GFR SERPLBLD CREATININE-BSD FMLA CKD-EPI: 110 ML/MIN/1.73 M 2
GLUCOSE SERPL-MCNC: 101 MG/DL (ref 65–99)
POTASSIUM SERPL-SCNC: 4.4 MMOL/L (ref 3.6–5.5)
SODIUM SERPL-SCNC: 133 MMOL/L (ref 135–145)

## 2022-10-28 PROCEDURE — 700102 HCHG RX REV CODE 250 W/ 637 OVERRIDE(OP): Performed by: SPECIALIST

## 2022-10-28 PROCEDURE — 700102 HCHG RX REV CODE 250 W/ 637 OVERRIDE(OP): Performed by: PLASTIC SURGERY

## 2022-10-28 PROCEDURE — 36415 COLL VENOUS BLD VENIPUNCTURE: CPT

## 2022-10-28 PROCEDURE — A9270 NON-COVERED ITEM OR SERVICE: HCPCS | Performed by: SPECIALIST

## 2022-10-28 PROCEDURE — A9270 NON-COVERED ITEM OR SERVICE: HCPCS

## 2022-10-28 PROCEDURE — 97535 SELF CARE MNGMENT TRAINING: CPT

## 2022-10-28 PROCEDURE — 99232 SBSQ HOSP IP/OBS MODERATE 35: CPT | Performed by: PHYSICIAN ASSISTANT

## 2022-10-28 PROCEDURE — 770001 HCHG ROOM/CARE - MED/SURG/GYN PRIV*

## 2022-10-28 PROCEDURE — 700111 HCHG RX REV CODE 636 W/ 250 OVERRIDE (IP): Performed by: SURGERY

## 2022-10-28 PROCEDURE — A9270 NON-COVERED ITEM OR SERVICE: HCPCS | Performed by: PLASTIC SURGERY

## 2022-10-28 PROCEDURE — 80048 BASIC METABOLIC PNL TOTAL CA: CPT

## 2022-10-28 PROCEDURE — 700102 HCHG RX REV CODE 250 W/ 637 OVERRIDE(OP)

## 2022-10-28 PROCEDURE — 700102 HCHG RX REV CODE 250 W/ 637 OVERRIDE(OP): Performed by: SURGERY

## 2022-10-28 PROCEDURE — A9270 NON-COVERED ITEM OR SERVICE: HCPCS | Performed by: SURGERY

## 2022-10-28 RX ADMIN — Medication 5 MG: at 20:46

## 2022-10-28 RX ADMIN — SULFAMETHOXAZOLE AND TRIMETHOPRIM 1 TABLET: 400; 80 TABLET ORAL at 05:41

## 2022-10-28 RX ADMIN — METAXALONE 400 MG: 800 TABLET ORAL at 17:54

## 2022-10-28 RX ADMIN — TRAZODONE HYDROCHLORIDE 50 MG: 50 TABLET ORAL at 20:46

## 2022-10-28 RX ADMIN — GABAPENTIN 100 MG: 100 CAPSULE ORAL at 14:45

## 2022-10-28 RX ADMIN — POTASSIUM CHLORIDE 40 MEQ: 20 TABLET, EXTENDED RELEASE ORAL at 11:21

## 2022-10-28 RX ADMIN — SULFAMETHOXAZOLE AND TRIMETHOPRIM 1 TABLET: 400; 80 TABLET ORAL at 17:55

## 2022-10-28 RX ADMIN — POTASSIUM CHLORIDE 40 MEQ: 20 TABLET, EXTENDED RELEASE ORAL at 05:41

## 2022-10-28 RX ADMIN — OXYCODONE 5 MG: 5 TABLET ORAL at 01:09

## 2022-10-28 RX ADMIN — GABAPENTIN 100 MG: 100 CAPSULE ORAL at 05:40

## 2022-10-28 RX ADMIN — ENOXAPARIN SODIUM 30 MG: 30 INJECTION SUBCUTANEOUS at 17:53

## 2022-10-28 RX ADMIN — ENOXAPARIN SODIUM 30 MG: 30 INJECTION SUBCUTANEOUS at 05:40

## 2022-10-28 RX ADMIN — AMLODIPINE BESYLATE 5 MG: 10 TABLET ORAL at 05:39

## 2022-10-28 RX ADMIN — METAXALONE 400 MG: 800 TABLET ORAL at 05:39

## 2022-10-28 RX ADMIN — 0.12% CHLORHEXIDINE GLUCONATE 15 ML: 1.2 RINSE ORAL at 17:54

## 2022-10-28 RX ADMIN — OXYCODONE 5 MG: 5 TABLET ORAL at 05:40

## 2022-10-28 RX ADMIN — POTASSIUM CHLORIDE 40 MEQ: 20 TABLET, EXTENDED RELEASE ORAL at 17:54

## 2022-10-28 RX ADMIN — METAXALONE 400 MG: 800 TABLET ORAL at 11:21

## 2022-10-28 RX ADMIN — FLUTICASONE PROPIONATE 100 MCG: 50 SPRAY, METERED NASAL at 05:40

## 2022-10-28 RX ADMIN — OXYCODONE 5 MG: 5 TABLET ORAL at 15:38

## 2022-10-28 RX ADMIN — GABAPENTIN 100 MG: 100 CAPSULE ORAL at 20:46

## 2022-10-28 RX ADMIN — OXYCODONE 5 MG: 5 TABLET ORAL at 11:21

## 2022-10-28 RX ADMIN — OXYCODONE 5 MG: 5 TABLET ORAL at 20:49

## 2022-10-28 RX ADMIN — 0.12% CHLORHEXIDINE GLUCONATE 15 ML: 1.2 RINSE ORAL at 05:40

## 2022-10-28 ASSESSMENT — ENCOUNTER SYMPTOMS
MYALGIAS: 0
CHILLS: 0
VOMITING: 0
SHORTNESS OF BREATH: 0
COUGH: 0
ABDOMINAL PAIN: 0
ROS GI COMMENTS: BM 10/27
NAUSEA: 0
FEVER: 0
NECK PAIN: 1

## 2022-10-28 ASSESSMENT — COGNITIVE AND FUNCTIONAL STATUS - GENERAL
EATING MEALS: A LITTLE
SUGGESTED CMS G CODE MODIFIER DAILY ACTIVITY: CK
DRESSING REGULAR LOWER BODY CLOTHING: A LITTLE
TOILETING: A LOT
DAILY ACTIVITIY SCORE: 16
HELP NEEDED FOR BATHING: A LOT
DRESSING REGULAR UPPER BODY CLOTHING: A LITTLE
PERSONAL GROOMING: A LITTLE

## 2022-10-28 ASSESSMENT — PAIN DESCRIPTION - PAIN TYPE
TYPE: ACUTE PAIN
TYPE: ACUTE PAIN;SURGICAL PAIN
TYPE: ACUTE PAIN
TYPE: ACUTE PAIN;SURGICAL PAIN
TYPE: ACUTE PAIN
TYPE: ACUTE PAIN
TYPE: ACUTE PAIN;SURGICAL PAIN

## 2022-10-28 NOTE — CARE PLAN
Problem: Knowledge Deficit - Standard  Goal: Patient and family/care givers will demonstrate understanding of plan of care, disease process/condition, diagnostic tests and medications  Outcome: Progressing  Note: Discussed POC and medications with patient.  Patient verbalized understanding.     The patient is Stable - Low risk of patient condition declining or worsening    Shift Goals  Clinical Goals: pain control, increase mobility, pulmoary hygiene  Patient Goals: pain control, heal  Family Goals: no family at bedside

## 2022-10-28 NOTE — CARE PLAN
Problem: Pain - Standard  Goal: Alleviation of pain or a reduction in pain to the patient’s comfort goal  Outcome: Progressing     Problem: Fall Risk  Goal: Patient will remain free from falls  Outcome: Progressing   The patient is Stable - Low risk of patient condition declining or worsening    Shift Goals  Clinical Goals: pain control, maintain skin integrity, increase mobility  Patient Goals: pain contorl, heal, ambulate  Family Goals: no family at bedside    Progress made toward(s) clinical / shift goals:  patient's pain assessed, discussed pain control methods, will continue to communicate with patient plan for pain control; patient's mobility assessed, demonstrates steady gate, up self     Patient is not progressing towards the following goals:

## 2022-10-28 NOTE — DISCHARGE PLANNING
Case Management Discharge Planning    Admission Date: 10/13/2022  GMLOS: 3.3  ALOS: 15    6-Clicks ADL Score: 16  6-Clicks Mobility Score: 18  PT and/or OT Eval ordered: Yes  Post-acute Referrals Ordered: Yes  Post-acute Choice Obtained: Yes  Has referral(s) been sent to post-acute provider:  Yes      Anticipated Discharge Dispo: Discharge Disposition: D/T to SNF with Medicare cert in anticipation of skilled care (03)    DME Needed: No    Action(s) Taken: OTHER. LSW spoke with Chelsie, Admissions Coordinator with [facility]. Per Chelsie, patient is ACCEPTED, pending authorization from insurance.    LSW called Ruthie with [facility], message left requesting assistance expediting the authorization from the insurance.    Escalations Completed: None    Medically Clear: Yes    Next Steps: Awaiting update from [facility]. Arranging transportation to [facility] once confirmation of bed is received.    Barriers to Discharge: Pending Insurance Authorization    Is the patient up for discharge tomorrow: BHARAT

## 2022-10-28 NOTE — DISCHARGE INSTRUCTIONS
- Call or seek medical attention for questions or concerns  - Follow up with the [facility] RETURN: PRN  - Follow up with Dr. Te Comer at [facility], call for appointment   - Follow up with Dr. Garrett Edgar 11/25/2022 for repeat cervical x-rays and follow up  - Follow up with primary care provider within one weeks time  - Resume regular diet  - May take over the counter acetaminophen or ibuprofen as needed for pain  - Continue daily over the counter stool softener while on narcotics  - No operation of machinery or motorized vehicles while under the influence of narcotics  - No alcohol, marijuana or illicit drug use while under the influence of narcotics  - In the event of a narcotic overdose naloxone (Narcan) is available without a prescription from any [facility] or [facility]  - No swimming, hot tubs, baths or wound submersion until cleared by outpatient provider. May shower  - No contact sports, strenuous activities, or heavy lifting until cleared by outpatient provider  - If respiratory decompensation, persistent or worsening pain, or signs or symptoms of infection occur seek medical attention

## 2022-10-28 NOTE — PROGRESS NOTES
Trauma / Surgical Daily Progress Note    Date of Service  10/28/2022    Chief Complaint  62 y.o. male admitted 10/13/2022 with Trauma, hiking and fall, spinal fracture, facial fxs, bilateral wrists fxs    10/17 ORIF intraarticular distal radius fracture. ORIF left intraarticular distal radius fracture. ORIF ulnar neck fracture.     10/18 ORIF bilateral [place] fractures through multiple surgical approaches including interdental fixation.    Interval Events  Ambulating halls this morning.   Patient accepted to [facility] in [place] pending insurance authorization and transportation arrangements through [employer].     - Patient remains medically cleared for transfer to post acute services.   - Continue to mobilize with PT/OT.   - Discharge planning.     Review of Systems  Review of Systems   Constitutional:  Negative for chills and fever.   Respiratory:  Negative for cough and shortness of breath.    Cardiovascular:  Negative for chest pain.   Gastrointestinal:  Negative for abdominal pain, nausea and vomiting.        BM 10/27   Genitourinary:         Voiding   Musculoskeletal:  Positive for neck pain. Negative for myalgias.        Bilateral wrist pain      Vital Signs  Temp:  [36 °C (96.8 °F)-36.7 °C (98.1 °F)] 36.1 °C (97 °F)  Pulse:  [77-94] 88  Resp:  [17-19] 17  BP: (140-155)/(73-81) 155/81  SpO2:  [95 %-98 %] 95 %    Physical Exam  Physical Exam  Vitals and nursing note reviewed.   Constitutional:       Appearance: Normal appearance.      Interventions: Cervical collar in place.   HENT:      Head: Normocephalic.      Comments: Healing ecchymosis and abrasions/lacerations over face     Mouth/Throat:      Mouth: Mucous membranes are moist.   Eyes:      Extraocular Movements: Extraocular movements intact.      Pupils: Pupils are equal, round, and reactive to light.   Neck:      Comments: Small area of erythema and skin irritation at left lateral base of collar. Skin is intact  Cardiovascular:      Rate  and Rhythm: Normal rate and regular rhythm.      Heart sounds: Normal heart sounds.   Pulmonary:      Effort: Pulmonary effort is normal. No respiratory distress.      Breath sounds: Normal breath sounds.   Abdominal:      General: There is no distension.      Palpations: Abdomen is soft.      Tenderness: There is no abdominal tenderness.   Genitourinary:     Comments: Condom catheter in place  Musculoskeletal:      Comments: Moves all extremities   BUE long arm splints   Skin:     General: Skin is warm and dry.   Neurological:      Mental Status: He is alert and oriented to person, place, and time.       Laboratory  Recent Results (from the past 24 hour(s))   Basic Metabolic Panel (BMP): Tomorrow AM    Collection Time: 10/28/22  8:06 AM   Result Value Ref Range    Sodium 133 (L) 135 - 145 mmol/L    Potassium 4.4 3.6 - 5.5 mmol/L    Chloride 98 96 - 112 mmol/L    Co2 26 20 - 33 mmol/L    Glucose 101 (H) 65 - 99 mg/dL    Bun 12 8 - 22 mg/dL    Creatinine 0.56 0.50 - 1.40 mg/dL    Calcium 8.9 8.5 - 10.5 mg/dL    Anion Gap 9.0 7.0 - 16.0   ESTIMATED GFR    Collection Time: 10/28/22  8:06 AM   Result Value Ref Range    GFR (CKD-EPI) 110 >60 mL/min/1.73 m 2       Fluids    Intake/Output Summary (Last 24 hours) at 10/28/2022 1118  Last data filed at 10/28/2022 1000  Gross per 24 hour   Intake 360 ml   Output 2100 ml   Net -1740 ml       Core Measures & Quality Metrics  Medications reviewed, Labs reviewed and Radiology images reviewed  Barajas catheter: No Barajas      DVT Prophylaxis: Enoxaparin (Lovenox)  DVT prophylaxis - mechanical: SCDs  Ulcer prophylaxis: Not indicated    Assessed for rehab: Patient was assess for and/or received rehabilitation services during this hospitalization  RAP Score Total: 5  CAGE Results: negative Blood Alcohol>0.08: no     Assessment/Plan  Discharge planning issues- (present on admission)  Assessment & Plan  Date of admission: 10/13/2022.  10/18 PM&R consulted, patient declined due to [employer]  insurance.   10/20 [place]  Cleared for discharge: Yes - Date: 10/25.  Discharge delayed: Yes - Reason: Non-availability of Transfer Facility.  Discharge date: tbd.    Leukocytosis- (present on admission)  Assessment & Plan  10/24 WBC 13.1. Afebrile. Nontoxic appearance.  - CXR stable. Duplex negative for DVT.  10/26 WBC 15.  - CXR negative   UA positive - initiate Septra DS x 7 days    Traumatic closed displaced fracture of distal end of radius and ulna, left, initial encounter- (present on admission)  Assessment & Plan  Comminuted intra-articular distal radius fracture on left.    Significant displacement of large radial sided fracture fragment.  Comminuted fracture of distal ulna.  Splinted at referring facility.  Reduced in ED with conscious sedation.  CT bilateral wrists completed.  10/17 ORIF distal radius.  Weight bearing status - Platform weightbearing LUE. ADL's ok with hands under 5 lbs.  Sutures/Staples out 14 days post operatively (10/31).  Te Comer MD. Orthopedic Surgeon. [facility]. and Deyvi Ocampo MD. Orthopedic Surgeon. [facility].      Closed fracture distal radius and ulna, right, initial encounter- (present on admission)  Assessment & Plan  Comminuted dorsally angulated intra-articular distal radius fracture with impaction.    Mildly distracted fracture of the base of the ulnar styloid.  Splinted at referring facility.  Reduced in ED with conscious sedation.  CT bilateral wrists completed.  10/18 ORIF distal radius.  Weight bearing status - Platform weightbearing RUE. ADL's ok with hands under 5 lbs.  Sutures/Staples out 14 days post operatively (11/1).  Te Comer MD. Orthopedic Surgeon. [facility].      Closed fracture of third cervical vertebra, initial encounter (HCC)- (present on admission)  Assessment & Plan  Fracture of the anterior inferior corner of the C3 vertebral body with slight anterior  displacement approximately 3 mm.  Equivocal nondisplaced fracture line extending to superior endplate of the vertebral body.  Left C3-4 facet mildly widened with posterior displacement of C3 facet in relation to C4.  CTA neck within normal limits.  Non-operative management.   Cervical immobilization.   Follow up in clinic 6 weeks with AP / lateral cervical spine xrays.  Garrett Edgar MD, PhD. Neurosurgeon. [facility].        Multiple facial fractures, closed, initial encounter (HCC)- (present on admission)  Assessment & Plan  Fractures of the anterior, medial and superior maxillary sinuses bilaterally, fracture of the right lateral maxillary sinus, bilateral nasal fractures.  Comminuted frontal sinus fracture which appears to involve superior orbital walls bilaterally.    Comminuted displaced fracture of the right superior medial orbit, no retro-orbital hematoma.  Bilateral pterygoid plate fractures.  LeForte III type fracture.  10/18 ORIF bilateral Le [place] fractures through multiple surgical approaches including interdental fixation.  Huey Ahuja MD. Plastic Surgeon. [facility].    Lip laceration- (present on admission)  Assessment & Plan  Repaired in ED with absorbable sutures.    No contraindication to deep vein thrombosis (DVT) prophylaxis- (present on admission)  Assessment & Plan  Prophylactic anticoagulation for thrombotic prevention initially contraindicated secondary to elevated bleeding risk.  10/14 Prophylactic dose enoxaparin initiated.       Finger dislocation, initial encounter- (present on admission)  Assessment & Plan  Dorsal dislocation of the PIP joint of the long finger without associated fracture.  Reduced at referring facility.     Trauma- (present on admission)  Assessment & Plan  Fall while hiking  Trauma Green Transfer Activation.  Marck Marquez MD. Trauma Surgery.     Discussed patient condition with RN, , , Patient,  and trauma surgery. Dr. STEVE Marquez.

## 2022-10-28 NOTE — THERAPY
Occupational Therapy  Daily Treatment     Patient Name: Leobardo Young  Age:  62 y.o., Sex:  male  Medical Record #: [id number]  Today's Date: 10/28/2022     Precautions  Precautions: Fall Risk, Platform Weight Bearing Left Upper Extremity, Platform Weight Bearing Right Upper Extremity, Cervical Collar  , Spinal / Back Precautions   Comments: C-collar on AAT    Assessment    Pt seen for OT tx. Pt continues to demonstrate improved ADL ability. This session focused on donning/doffing a shirt w/in precautions. Pt trialed a few different strategies; however still required min A due to limited pinch strength. Also discussed social situation upon d/c. Pt stated he discussed having his brother stay w/ him, but this will not be occurring due to brothers work status and own health issues. Pt will have limited social support upon d/c. Additionally, pt reported 2 locks and round doorknobs to enter his house, which will make entry difficult due to limited hand strength and ROM limitations. Due to significant limitations in ADL capability and limited social support, pt is recommended to d/c to a post acute placement upon d/c. Pt is a good candidate for rehab. Pt will continue to be followed while admitted to acute care.     Plan    Continue current treatment plan.    DC Equipment Recommendations: Unable to determine at this time  Discharge Recommendations: Recommend post-acute placement for additional occupational therapy services prior to discharge home    Subjective    Very pleasant and cooperative     Objective     10/28/22 0318   Pain   Pain Scales 0 to 10 Scale    Pain 0 - 10 Group   Therapist Pain Assessment Post Activity Pain Same as Prior to Activity  (no pain reported, not rated)   Non Verbal Descriptors   Non Verbal Scale  Calm;Unlabored Breathing   Cognition    Cognition / Consciousness WDL   Level of Consciousness Alert   Comments Very pleasant, cooperative, and motivated   Strength Upper Body   Upper Body Strength   X   Right  Impaired   Left  Impaired   Comments B pinch strength 3+/4 for pincer and 3 jaw nilson   Other Treatments   Other Treatments Provided Discussed getting in/out of the apt. Pt reported there are two locks requiring keys and round door knobs, all of which will be difficult for pt given current strength and ROM limitations. Noted that in chart, pt had mentioned his brother possibly staying w/ him upon d/c. Followed up this session and this will not be a possibility. Pt w/ very limited social support upon d/c.   Balance   Sitting Balance (Static) Fair +   Sitting Balance (Dynamic) Fair +   Standing Balance (Static) Fair   Standing Balance (Dynamic) Fair   Weight Shift Sitting Good   Weight Shift Standing Fair   Skilled Intervention Verbal Cuing;Facilitation   Comments no AD   Bed Mobility    Supine to Sit Supervised   Sit to Supine Supervised   Scooting Supervised   Rolling Supervised   Skilled Intervention Verbal Cuing;Facilitation;Compensatory Strategies   Comments HOB raised, good recall of log roll and precautions w/ bed mobility   Activities of Daily Living   Eating Minimal Assist  (difficulty opening containers)   Upper Body Dressing Minimal Assist  (don/doff t-shirt, multiple strategies attemtped to maintain precautions)   Toileting   (declined the need)   Skilled Intervention Verbal Cuing;Facilitation;Compensatory Strategies;Sequencing   Functional Mobility   Sit to Stand Supervised   Bed, Chair, Wheelchair Transfer Supervised   Mobility EOB>a few steps>bed   Skilled Intervention Verbal Cuing;Facilitation   Comments no AD or LOB   Visual Perception   Visual Perception  Not Tested   Activity Tolerance   Sitting in Chair NT   Sitting Edge of Bed ~7 min   Standing <2 min total   Comments no reports of pain or fatigue   Patient / Family Goals   Patient / Family Goal #1 to be more independent   Goal #1 Outcome Progressing as expected   Short Term Goals   Short Term Goal # 1 Pt will don gown w/ supv    Goal Outcome # 1 Progressing as expected   Short Term Goal # 2 Pt will feed self w/ supv and compensatory strategies PRN   Goal Outcome # 2 Progressing as expected   Short Term Goal # 3 Pt will perform toileting w/ min A   Goal Outcome # 3 Goal not met  (not addressed this session)   Short Term Goal # 4 Pt will perfom g/h w/ supv and compensatory strategies PRN   Goal Outcome # 4 Goal not met  (not addressed this session)   Education Group   Education Provided Role of Occupational Therapist;Activities of Daily Living;Home Safety;Weight Bearing Precautions;Back Safety   Role of Occupational Therapist Patient Response Patient;Acceptance;Explanation;Verbal Demonstration   Back Safety Patient Response Patient;Acceptance;Explanation;Demonstration;Verbal Demonstration;Action Demonstration   Home Safety Patient Response Patient;Acceptance;Explanation;Verbal Demonstration   ADL Patient Response Patient;Acceptance;Explanation;Demonstration;Verbal Demonstration;Action Demonstration   Weight Bearing Precautions Patient Response Patient;Acceptance;Explanation;Demonstration;Verbal Demonstration;Action Demonstration

## 2022-10-28 NOTE — PROGRESS NOTES
Morning assessment of patient complete. Patient is A&Ox4 on room air. Reports 5/10 pain to his bilateral arms, declines intervention at this time. C-collar in place, casts clean, dry, intact.

## 2022-10-29 PROBLEM — S01.511A LIP LACERATION: Status: RESOLVED | Noted: 2022-10-13 | Resolved: 2022-10-29

## 2022-10-29 PROBLEM — D72.829 LEUKOCYTOSIS: Status: RESOLVED | Noted: 2022-10-24 | Resolved: 2022-10-29

## 2022-10-29 PROBLEM — S63.259A FINGER DISLOCATION, INITIAL ENCOUNTER: Status: RESOLVED | Noted: 2022-10-13 | Resolved: 2022-10-29

## 2022-10-29 PROCEDURE — 700102 HCHG RX REV CODE 250 W/ 637 OVERRIDE(OP): Performed by: SPECIALIST

## 2022-10-29 PROCEDURE — A9270 NON-COVERED ITEM OR SERVICE: HCPCS | Performed by: PLASTIC SURGERY

## 2022-10-29 PROCEDURE — 700102 HCHG RX REV CODE 250 W/ 637 OVERRIDE(OP)

## 2022-10-29 PROCEDURE — 99232 SBSQ HOSP IP/OBS MODERATE 35: CPT | Performed by: PHYSICIAN ASSISTANT

## 2022-10-29 PROCEDURE — 700102 HCHG RX REV CODE 250 W/ 637 OVERRIDE(OP): Performed by: PLASTIC SURGERY

## 2022-10-29 PROCEDURE — 700111 HCHG RX REV CODE 636 W/ 250 OVERRIDE (IP): Performed by: SURGERY

## 2022-10-29 PROCEDURE — A9270 NON-COVERED ITEM OR SERVICE: HCPCS

## 2022-10-29 PROCEDURE — 700102 HCHG RX REV CODE 250 W/ 637 OVERRIDE(OP): Performed by: SURGERY

## 2022-10-29 PROCEDURE — 770001 HCHG ROOM/CARE - MED/SURG/GYN PRIV*

## 2022-10-29 PROCEDURE — A9270 NON-COVERED ITEM OR SERVICE: HCPCS | Performed by: SPECIALIST

## 2022-10-29 PROCEDURE — A9270 NON-COVERED ITEM OR SERVICE: HCPCS | Performed by: SURGERY

## 2022-10-29 RX ADMIN — METAXALONE 400 MG: 800 TABLET ORAL at 20:55

## 2022-10-29 RX ADMIN — METAXALONE 400 MG: 800 TABLET ORAL at 05:59

## 2022-10-29 RX ADMIN — OXYCODONE 5 MG: 5 TABLET ORAL at 04:19

## 2022-10-29 RX ADMIN — Medication 5 MG: at 20:55

## 2022-10-29 RX ADMIN — FLUTICASONE PROPIONATE 100 MCG: 50 SPRAY, METERED NASAL at 05:58

## 2022-10-29 RX ADMIN — GABAPENTIN 100 MG: 100 CAPSULE ORAL at 21:03

## 2022-10-29 RX ADMIN — POTASSIUM CHLORIDE 40 MEQ: 20 TABLET, EXTENDED RELEASE ORAL at 05:58

## 2022-10-29 RX ADMIN — OXYCODONE 5 MG: 5 TABLET ORAL at 09:04

## 2022-10-29 RX ADMIN — 0.12% CHLORHEXIDINE GLUCONATE 15 ML: 1.2 RINSE ORAL at 20:56

## 2022-10-29 RX ADMIN — GABAPENTIN 100 MG: 100 CAPSULE ORAL at 14:50

## 2022-10-29 RX ADMIN — SULFAMETHOXAZOLE AND TRIMETHOPRIM 1 TABLET: 400; 80 TABLET ORAL at 05:58

## 2022-10-29 RX ADMIN — GABAPENTIN 100 MG: 100 CAPSULE ORAL at 05:58

## 2022-10-29 RX ADMIN — SENNOSIDES AND DOCUSATE SODIUM 1 TABLET: 50; 8.6 TABLET ORAL at 20:55

## 2022-10-29 RX ADMIN — AMLODIPINE BESYLATE 5 MG: 10 TABLET ORAL at 05:59

## 2022-10-29 RX ADMIN — SULFAMETHOXAZOLE AND TRIMETHOPRIM 1 TABLET: 400; 80 TABLET ORAL at 17:16

## 2022-10-29 RX ADMIN — METAXALONE 400 MG: 800 TABLET ORAL at 14:50

## 2022-10-29 RX ADMIN — OXYCODONE 5 MG: 5 TABLET ORAL at 20:55

## 2022-10-29 RX ADMIN — DOCUSATE SODIUM 100 MG: 100 CAPSULE, LIQUID FILLED ORAL at 17:16

## 2022-10-29 RX ADMIN — ENOXAPARIN SODIUM 30 MG: 30 INJECTION SUBCUTANEOUS at 17:16

## 2022-10-29 RX ADMIN — 0.12% CHLORHEXIDINE GLUCONATE 15 ML: 1.2 RINSE ORAL at 05:59

## 2022-10-29 RX ADMIN — OXYCODONE 5 MG: 5 TABLET ORAL at 01:31

## 2022-10-29 RX ADMIN — POTASSIUM CHLORIDE 40 MEQ: 20 TABLET, EXTENDED RELEASE ORAL at 14:50

## 2022-10-29 RX ADMIN — TRAZODONE HYDROCHLORIDE 50 MG: 50 TABLET ORAL at 20:56

## 2022-10-29 RX ADMIN — OXYCODONE 5 MG: 5 TABLET ORAL at 15:37

## 2022-10-29 RX ADMIN — ENOXAPARIN SODIUM 30 MG: 30 INJECTION SUBCUTANEOUS at 05:58

## 2022-10-29 ASSESSMENT — PAIN DESCRIPTION - PAIN TYPE
TYPE: ACUTE PAIN
TYPE: ACUTE PAIN;SURGICAL PAIN

## 2022-10-29 ASSESSMENT — ENCOUNTER SYMPTOMS
ROS GI COMMENTS: BM 10/27
SHORTNESS OF BREATH: 0
CHILLS: 0
COUGH: 0
MYALGIAS: 0
VOMITING: 0
ABDOMINAL PAIN: 0
NAUSEA: 0
NECK PAIN: 1
FEVER: 0

## 2022-10-29 NOTE — CARE PLAN
The patient is Stable - Low risk of patient condition declining or worsening    Shift Goals  Clinical Goals: pain control, mobility  Patient Goals: pain control, heal, ambulate  Family Goals: N/A    Progress made toward(s) clinical / shift goals:      Report received from NOC RN.  Assessment complete.  A&O x 4. Patient calls appropriately.  Patient up with SB assist. Walks independently in halls. C collar on at all times.  Patient has 5/10 pain. Medicated.  Patient denies SOB.  Patient is on room air.  Denies N&V. Tolerating diet.  Skin: cast splints to bilateral upper arms CDI.  + void, + flatus, 10/29 BM.  Review plan with of care with patient.   Call light and personal belongings with in reach.   Hourly rounding in place.   All needs met at this time.     Patient is not progressing towards the following goals:

## 2022-10-29 NOTE — CARE PLAN
The patient is Stable - Low risk of patient condition declining or worsening    Shift Goals  Clinical Goals: pain control, maintain skin integrity, increase mobility  Patient Goals: pain control, heal, ambulate  Family Goals: N/A    Progress made toward(s) clinical / shift goals:  POC/medications discussed with pt. Pt reported understanding. Pt reported of surgical pain thru out the night. Medicated chaparro MAR for pain and pain was reassessed after administration. Pain is staying at comfort level at this time.      Problem: Knowledge Deficit - Standard  Goal: Patient and family/care givers will demonstrate understanding of plan of care, disease process/condition, diagnostic tests and medications  Outcome: Progressing     Problem: Pain - Standard  Goal: Alleviation of pain or a reduction in pain to the patient’s comfort goal  Outcome: Progressing

## 2022-10-30 PROCEDURE — 700102 HCHG RX REV CODE 250 W/ 637 OVERRIDE(OP): Performed by: SPECIALIST

## 2022-10-30 PROCEDURE — A9270 NON-COVERED ITEM OR SERVICE: HCPCS | Performed by: SPECIALIST

## 2022-10-30 PROCEDURE — 700102 HCHG RX REV CODE 250 W/ 637 OVERRIDE(OP): Performed by: SURGERY

## 2022-10-30 PROCEDURE — 99231 SBSQ HOSP IP/OBS SF/LOW 25: CPT | Performed by: PHYSICIAN ASSISTANT

## 2022-10-30 PROCEDURE — A9270 NON-COVERED ITEM OR SERVICE: HCPCS

## 2022-10-30 PROCEDURE — 700111 HCHG RX REV CODE 636 W/ 250 OVERRIDE (IP): Performed by: SURGERY

## 2022-10-30 PROCEDURE — 770001 HCHG ROOM/CARE - MED/SURG/GYN PRIV*

## 2022-10-30 PROCEDURE — 700102 HCHG RX REV CODE 250 W/ 637 OVERRIDE(OP): Performed by: PLASTIC SURGERY

## 2022-10-30 PROCEDURE — A9270 NON-COVERED ITEM OR SERVICE: HCPCS | Performed by: SURGERY

## 2022-10-30 PROCEDURE — A9270 NON-COVERED ITEM OR SERVICE: HCPCS | Performed by: PLASTIC SURGERY

## 2022-10-30 PROCEDURE — 700102 HCHG RX REV CODE 250 W/ 637 OVERRIDE(OP)

## 2022-10-30 RX ORDER — CHLORHEXIDINE GLUCONATE ORAL RINSE 1.2 MG/ML
15 SOLUTION DENTAL 4 TIMES DAILY PRN
Status: DISCONTINUED | OUTPATIENT
Start: 2022-10-30 | End: 2022-11-03 | Stop reason: HOSPADM

## 2022-10-30 RX ADMIN — Medication 5 MG: at 20:31

## 2022-10-30 RX ADMIN — MAGNESIUM HYDROXIDE 30 ML: 400 SUSPENSION ORAL at 06:03

## 2022-10-30 RX ADMIN — SENNOSIDES AND DOCUSATE SODIUM 1 TABLET: 50; 8.6 TABLET ORAL at 20:31

## 2022-10-30 RX ADMIN — GABAPENTIN 100 MG: 100 CAPSULE ORAL at 06:02

## 2022-10-30 RX ADMIN — METAXALONE 400 MG: 800 TABLET ORAL at 13:54

## 2022-10-30 RX ADMIN — GABAPENTIN 100 MG: 100 CAPSULE ORAL at 22:11

## 2022-10-30 RX ADMIN — OXYCODONE HYDROCHLORIDE 10 MG: 10 TABLET ORAL at 00:49

## 2022-10-30 RX ADMIN — OXYCODONE 5 MG: 5 TABLET ORAL at 09:43

## 2022-10-30 RX ADMIN — TRAZODONE HYDROCHLORIDE 50 MG: 50 TABLET ORAL at 20:31

## 2022-10-30 RX ADMIN — METAXALONE 400 MG: 800 TABLET ORAL at 06:02

## 2022-10-30 RX ADMIN — OXYCODONE 5 MG: 5 TABLET ORAL at 20:31

## 2022-10-30 RX ADMIN — DOCUSATE SODIUM 100 MG: 100 CAPSULE, LIQUID FILLED ORAL at 06:03

## 2022-10-30 RX ADMIN — GABAPENTIN 100 MG: 100 CAPSULE ORAL at 13:54

## 2022-10-30 RX ADMIN — POLYETHYLENE GLYCOL 3350 1 PACKET: 17 POWDER, FOR SOLUTION ORAL at 06:02

## 2022-10-30 RX ADMIN — DOCUSATE SODIUM 100 MG: 100 CAPSULE, LIQUID FILLED ORAL at 16:54

## 2022-10-30 RX ADMIN — ENOXAPARIN SODIUM 30 MG: 30 INJECTION SUBCUTANEOUS at 16:54

## 2022-10-30 RX ADMIN — OXYCODONE 5 MG: 5 TABLET ORAL at 06:08

## 2022-10-30 RX ADMIN — FLUTICASONE PROPIONATE 100 MCG: 50 SPRAY, METERED NASAL at 06:09

## 2022-10-30 RX ADMIN — OXYCODONE 5 MG: 5 TABLET ORAL at 16:54

## 2022-10-30 RX ADMIN — SULFAMETHOXAZOLE AND TRIMETHOPRIM 1 TABLET: 400; 80 TABLET ORAL at 06:02

## 2022-10-30 RX ADMIN — ENOXAPARIN SODIUM 30 MG: 30 INJECTION SUBCUTANEOUS at 06:03

## 2022-10-30 RX ADMIN — AMLODIPINE BESYLATE 5 MG: 10 TABLET ORAL at 06:02

## 2022-10-30 RX ADMIN — 0.12% CHLORHEXIDINE GLUCONATE 15 ML: 1.2 RINSE ORAL at 06:03

## 2022-10-30 RX ADMIN — METAXALONE 400 MG: 800 TABLET ORAL at 16:54

## 2022-10-30 RX ADMIN — SULFAMETHOXAZOLE AND TRIMETHOPRIM 1 TABLET: 400; 80 TABLET ORAL at 16:54

## 2022-10-30 ASSESSMENT — ENCOUNTER SYMPTOMS
ABDOMINAL PAIN: 0
ROS GI COMMENTS: BM 10/28
CHILLS: 0
MYALGIAS: 0
VOMITING: 0
FEVER: 0
NECK PAIN: 1
COUGH: 0
SHORTNESS OF BREATH: 0
NAUSEA: 0

## 2022-10-30 ASSESSMENT — PAIN DESCRIPTION - PAIN TYPE: TYPE: ACUTE PAIN

## 2022-10-30 NOTE — PROGRESS NOTES
Trauma / Surgical Daily Progress Note    Date of Service  10/30/2022    Chief Complaint  62 y.o. male admitted 10/13/2022 with Trauma, hiking and fall, spinal fracture, facial fxs, bilateral wrists fxs    10/17 ORIF intraarticular distal radius fracture. ORIF left intraarticular distal radius fracture. ORIF ulnar neck fracture.     10/18 ORIF bilateral [place] fractures through multiple surgical approaches including interdental fixation.    Interval Events  No acute overnight events.   Patient has no complaints.     - Remains medically cleared for transfer to SNF.   - Awaiting insurance approval for transfer.   - Discharge planning.     Review of Systems  Review of Systems   Constitutional:  Negative for chills and fever.   Respiratory:  Negative for cough and shortness of breath.    Cardiovascular:  Negative for chest pain.   Gastrointestinal:  Negative for abdominal pain, nausea and vomiting.        BM 10/28   Genitourinary:         Voiding   Musculoskeletal:  Positive for neck pain. Negative for myalgias.        Bilateral wrist pain      Vital Signs  Temp:  [35.9 °C (96.7 °F)-36.3 °C (97.3 °F)] 36.1 °C (97 °F)  Pulse:  [69-98] 89  Resp:  [16-17] 16  BP: (129-145)/(76-89) 145/84  SpO2:  [95 %-96 %] 95 %    Physical Exam  Physical Exam  Vitals and nursing note reviewed.   Constitutional:       Appearance: Normal appearance.      Interventions: Cervical collar in place.   HENT:      Head: Normocephalic.      Comments: Healing ecchymosis and surgical incisions over face     Mouth/Throat:      Mouth: Mucous membranes are moist.   Eyes:      Extraocular Movements: Extraocular movements intact.      Pupils: Pupils are equal, round, and reactive to light.   Neck:      Comments: Small area of erythema and skin irritation at left lateral base of c-collar. Skin is intact  Cardiovascular:      Rate and Rhythm: Normal rate and regular rhythm.      Heart sounds: Normal heart sounds.   Pulmonary:      Effort: Pulmonary effort  is normal. No respiratory distress.      Breath sounds: Normal breath sounds.   Abdominal:      General: There is no distension.      Palpations: Abdomen is soft.      Tenderness: There is no abdominal tenderness.   Genitourinary:     Comments: Condom catheter in place  Musculoskeletal:      Comments: Moves all extremities   BUE long arm splints   Skin:     General: Skin is warm and dry.   Neurological:      Mental Status: He is alert and oriented to person, place, and time.   Psychiatric:         Behavior: Behavior normal.       Laboratory  No results found for this or any previous visit (from the past 24 hour(s)).    Fluids    Intake/Output Summary (Last 24 hours) at 10/30/2022 1014  Last data filed at 10/30/2022 0800  Gross per 24 hour   Intake 480 ml   Output 1750 ml   Net -1270 ml       Core Measures & Quality Metrics  Medications reviewed, Labs reviewed and Radiology images reviewed  Barajas catheter: No Barajas      DVT Prophylaxis: Enoxaparin (Lovenox)  DVT prophylaxis - mechanical: SCDs  Ulcer prophylaxis: Not indicated    Assessed for rehab: Patient was assess for and/or received rehabilitation services during this hospitalization  RAP Score Total: 5  CAGE Results: negative Blood Alcohol>0.08: no     Assessment/Plan  Discharge planning issues- (present on admission)  Assessment & Plan  Date of admission: 10/13/2022.  10/18 PM&R consulted, patient declined due to [employer] insurance.   10/20 Case management assisting with placement in the [place] area.  10/28 Acceptance to [facility] pending insurance.  Cleared for discharge: Yes - Date: 10/25.  Discharge delayed: Yes - Reason: Non-availability of Transfer Facility.  Discharge date: tbd.    Traumatic closed displaced fracture of distal end of radius and ulna, left, initial encounter- (present on admission)  Assessment & Plan  Comminuted intra-articular distal radius fracture on left.    Significant displacement of large radial sided fracture fragment.   Comminuted fracture of distal ulna.  Splinted at referring facility.  Reduced in ED with conscious sedation.  CT bilateral wrists completed.  10/17 ORIF distal radius.  Weight bearing status - Platform weightbearing LUE. ADL's ok with hands under 5 lbs.  Sutures/Staples out 14 days post operatively (10/31).  Te Comer MD. Orthopedic Surgeon. [facility]. and Deyvi Ocampo MD. Orthopedic Surgeon. [facility].      Closed fracture distal radius and ulna, right, initial encounter- (present on admission)  Assessment & Plan  Comminuted dorsally angulated intra-articular distal radius fracture with impaction.    Mildly distracted fracture of the base of the ulnar styloid.  Splinted at referring facility.  Reduced in ED with conscious sedation.  CT bilateral wrists completed.  10/18 ORIF distal radius.  Weight bearing status - Platform weightbearing RUE. ADL's ok with hands under 5 lbs.  Sutures/Staples out 14 days post operatively (11/1).  Te Comer MD. Orthopedic Surgeon. [facility].      Closed fracture of third cervical vertebra, initial encounter (HCC)- (present on admission)  Assessment & Plan  Fracture of the anterior inferior corner of the C3 vertebral body with slight anterior displacement approximately 3 mm.  Equivocal nondisplaced fracture line extending to superior endplate of the vertebral body.  Left C3-4 facet mildly widened with posterior displacement of C3 facet in relation to C4.  CTA neck within normal limits.  Non-operative management.   Cervical immobilization.   Follow up in clinic 6 weeks with AP / lateral cervical spine xrays.  Garrett Edgar MD, PhD. Neurosurgeon. [facility].        Multiple facial fractures, closed, initial encounter (HCC)- (present on admission)  Assessment & Plan  Fractures of the anterior, medial and superior maxillary sinuses bilaterally, fracture of the right lateral maxillary sinus, bilateral nasal fractures.  Comminuted  frontal sinus fracture which appears to involve superior orbital walls bilaterally.    Comminuted displaced fracture of the right superior medial orbit, no retro-orbital hematoma.  Bilateral pterygoid plate fractures.  LeForte III type fracture.  10/18 ORIF bilateral [place] fractures through multiple surgical approaches including interdental fixation.  Huey Ahuja MD. Plastic Surgeon. [facility].    No contraindication to deep vein thrombosis (DVT) prophylaxis- (present on admission)  Assessment & Plan  Prophylactic anticoagulation for thrombotic prevention initially contraindicated secondary to elevated bleeding risk.  10/14 Prophylactic dose enoxaparin initiated.       Trauma- (present on admission)  Assessment & Plan  Fall while hiking  Trauma Green Transfer Activation.  Marck Marquez MD. Trauma Surgery.       Discussed patient condition with RN, , Patient, and trauma surgery. Dr. Handy Egan.

## 2022-10-30 NOTE — PROGRESS NOTES
Received report of patient at start of shift. Patient is AOx4, PRN oxycodone administered for complaints of pain. Assessment complete, patient on room air. C-collar in place. Patient ambulates with standby assist. Patient updated on plan of care, encouraged to notify staff for any needs/assistance. Call light within reach.

## 2022-10-30 NOTE — CARE PLAN
The patient is Stable - Low risk of patient condition declining or worsening    Shift Goals  Clinical Goals: pain control  Patient Goals: Pain management, rest  Family Goals: N/A    Progress made toward(s) clinical / shift goals:     Report received from NOC RN.  Assessment complete.  A&O x 4. Patient calls appropriately.  Patient up with SB assist. Walks independently in halls. C collar on at all times.  Patient has 5-6/10 pain. Medicated.  Patient denies SOB.  Patient is on room air.  Denies N&V. Tolerating diet.  Skin: cast splints to bilateral upper arms CDI.  + void, + flatus, 10/29 BM.  Review plan with of care with patient.   Call light and personal belongings with in reach.   Hourly rounding in place.   All needs met at this time.     Patient is not progressing towards the following goals:

## 2022-10-30 NOTE — CARE PLAN
The patient is Stable - Low risk of patient condition declining or worsening    Shift Goals  Clinical Goals: Pain management, rest  Patient Goals: Pain management, rest  Family Goals: N/A    Progress made toward(s) clinical / shift goals:  Patient reports reduction in pain with use of PRN oxycodone. Patient sleeping intermittently through shift.

## 2022-10-31 ENCOUNTER — TELEPHONE (OUTPATIENT)
Dept: OTHER | Facility: MEDICAL CENTER | Age: 62
End: 2022-10-31

## 2022-10-31 LAB
ANION GAP SERPL CALC-SCNC: 10 MMOL/L (ref 7–16)
BASOPHILS # BLD AUTO: 0.6 % (ref 0–1.8)
BASOPHILS # BLD: 0.05 K/UL (ref 0–0.12)
BUN SERPL-MCNC: 13 MG/DL (ref 8–22)
CALCIUM SERPL-MCNC: 9 MG/DL (ref 8.5–10.5)
CHLORIDE SERPL-SCNC: 97 MMOL/L (ref 96–112)
CO2 SERPL-SCNC: 27 MMOL/L (ref 20–33)
CREAT SERPL-MCNC: 0.67 MG/DL (ref 0.5–1.4)
EOSINOPHIL # BLD AUTO: 0.08 K/UL (ref 0–0.51)
EOSINOPHIL NFR BLD: 1 % (ref 0–6.9)
ERYTHROCYTE [DISTWIDTH] IN BLOOD BY AUTOMATED COUNT: 40.8 FL (ref 35.9–50)
GFR SERPLBLD CREATININE-BSD FMLA CKD-EPI: 105 ML/MIN/1.73 M 2
GLUCOSE SERPL-MCNC: 122 MG/DL (ref 65–99)
HCT VFR BLD AUTO: 37.6 % (ref 42–52)
HGB BLD-MCNC: 12.5 G/DL (ref 14–18)
IMM GRANULOCYTES # BLD AUTO: 0.31 K/UL (ref 0–0.11)
IMM GRANULOCYTES NFR BLD AUTO: 3.8 % (ref 0–0.9)
LYMPHOCYTES # BLD AUTO: 2.06 K/UL (ref 1–4.8)
LYMPHOCYTES NFR BLD: 25.3 % (ref 22–41)
MCH RBC QN AUTO: 29.6 PG (ref 27–33)
MCHC RBC AUTO-ENTMCNC: 33.2 G/DL (ref 33.7–35.3)
MCV RBC AUTO: 88.9 FL (ref 81.4–97.8)
MONOCYTES # BLD AUTO: 0.39 K/UL (ref 0–0.85)
MONOCYTES NFR BLD AUTO: 4.8 % (ref 0–13.4)
NEUTROPHILS # BLD AUTO: 5.24 K/UL (ref 1.82–7.42)
NEUTROPHILS NFR BLD: 64.5 % (ref 44–72)
NRBC # BLD AUTO: 0 K/UL
NRBC BLD-RTO: 0 /100 WBC
PLATELET # BLD AUTO: 395 K/UL (ref 164–446)
PMV BLD AUTO: 9.1 FL (ref 9–12.9)
POTASSIUM SERPL-SCNC: 4.1 MMOL/L (ref 3.6–5.5)
RBC # BLD AUTO: 4.23 M/UL (ref 4.7–6.1)
SARS-COV+SARS-COV-2 AG RESP QL IA.RAPID: NOTDETECTED
SODIUM SERPL-SCNC: 134 MMOL/L (ref 135–145)
SPECIMEN SOURCE: NORMAL
WBC # BLD AUTO: 8.1 K/UL (ref 4.8–10.8)

## 2022-10-31 PROCEDURE — A9270 NON-COVERED ITEM OR SERVICE: HCPCS | Performed by: SURGERY

## 2022-10-31 PROCEDURE — 97535 SELF CARE MNGMENT TRAINING: CPT

## 2022-10-31 PROCEDURE — A9270 NON-COVERED ITEM OR SERVICE: HCPCS | Performed by: SPECIALIST

## 2022-10-31 PROCEDURE — 80048 BASIC METABOLIC PNL TOTAL CA: CPT

## 2022-10-31 PROCEDURE — 99232 SBSQ HOSP IP/OBS MODERATE 35: CPT | Performed by: PHYSICIAN ASSISTANT

## 2022-10-31 PROCEDURE — A9270 NON-COVERED ITEM OR SERVICE: HCPCS

## 2022-10-31 PROCEDURE — 85025 COMPLETE CBC W/AUTO DIFF WBC: CPT

## 2022-10-31 PROCEDURE — 700102 HCHG RX REV CODE 250 W/ 637 OVERRIDE(OP): Performed by: SPECIALIST

## 2022-10-31 PROCEDURE — 36415 COLL VENOUS BLD VENIPUNCTURE: CPT

## 2022-10-31 PROCEDURE — 700111 HCHG RX REV CODE 636 W/ 250 OVERRIDE (IP): Performed by: SURGERY

## 2022-10-31 PROCEDURE — 700102 HCHG RX REV CODE 250 W/ 637 OVERRIDE(OP)

## 2022-10-31 PROCEDURE — 87426 SARSCOV CORONAVIRUS AG IA: CPT

## 2022-10-31 PROCEDURE — 700102 HCHG RX REV CODE 250 W/ 637 OVERRIDE(OP): Performed by: SURGERY

## 2022-10-31 PROCEDURE — 97116 GAIT TRAINING THERAPY: CPT

## 2022-10-31 PROCEDURE — 770001 HCHG ROOM/CARE - MED/SURG/GYN PRIV*

## 2022-10-31 RX ADMIN — METAXALONE 400 MG: 800 TABLET ORAL at 17:04

## 2022-10-31 RX ADMIN — AMLODIPINE BESYLATE 5 MG: 10 TABLET ORAL at 05:21

## 2022-10-31 RX ADMIN — DOCUSATE SODIUM 100 MG: 100 CAPSULE, LIQUID FILLED ORAL at 05:21

## 2022-10-31 RX ADMIN — SULFAMETHOXAZOLE AND TRIMETHOPRIM 1 TABLET: 400; 80 TABLET ORAL at 17:04

## 2022-10-31 RX ADMIN — GABAPENTIN 100 MG: 100 CAPSULE ORAL at 12:59

## 2022-10-31 RX ADMIN — OXYCODONE HYDROCHLORIDE 10 MG: 10 TABLET ORAL at 00:40

## 2022-10-31 RX ADMIN — DOCUSATE SODIUM 100 MG: 100 CAPSULE, LIQUID FILLED ORAL at 17:04

## 2022-10-31 RX ADMIN — SENNOSIDES AND DOCUSATE SODIUM 1 TABLET: 50; 8.6 TABLET ORAL at 21:19

## 2022-10-31 RX ADMIN — ENOXAPARIN SODIUM 30 MG: 30 INJECTION SUBCUTANEOUS at 17:04

## 2022-10-31 RX ADMIN — TRAZODONE HYDROCHLORIDE 50 MG: 50 TABLET ORAL at 21:19

## 2022-10-31 RX ADMIN — OXYCODONE HYDROCHLORIDE 10 MG: 10 TABLET ORAL at 21:21

## 2022-10-31 RX ADMIN — GABAPENTIN 100 MG: 100 CAPSULE ORAL at 05:21

## 2022-10-31 RX ADMIN — METAXALONE 400 MG: 800 TABLET ORAL at 05:20

## 2022-10-31 RX ADMIN — ENOXAPARIN SODIUM 30 MG: 30 INJECTION SUBCUTANEOUS at 05:21

## 2022-10-31 RX ADMIN — Medication 5 MG: at 21:19

## 2022-10-31 RX ADMIN — SULFAMETHOXAZOLE AND TRIMETHOPRIM 1 TABLET: 400; 80 TABLET ORAL at 05:21

## 2022-10-31 RX ADMIN — OXYCODONE 5 MG: 5 TABLET ORAL at 07:00

## 2022-10-31 RX ADMIN — METAXALONE 400 MG: 800 TABLET ORAL at 12:58

## 2022-10-31 RX ADMIN — FLUTICASONE PROPIONATE 100 MCG: 50 SPRAY, METERED NASAL at 05:21

## 2022-10-31 RX ADMIN — GABAPENTIN 100 MG: 100 CAPSULE ORAL at 21:19

## 2022-10-31 ASSESSMENT — ENCOUNTER SYMPTOMS
MYALGIAS: 0
SHORTNESS OF BREATH: 0
FEVER: 0
NAUSEA: 0
VOMITING: 0
ABDOMINAL PAIN: 0
CHILLS: 0
COUGH: 0
ROS GI COMMENTS: BM 10/30

## 2022-10-31 ASSESSMENT — GAIT ASSESSMENTS
DEVIATION: BRADYKINETIC
DISTANCE (FEET): 500
GAIT LEVEL OF ASSIST: SUPERVISED

## 2022-10-31 ASSESSMENT — COGNITIVE AND FUNCTIONAL STATUS - GENERAL
DRESSING REGULAR LOWER BODY CLOTHING: A LITTLE
MOVING FROM LYING ON BACK TO SITTING ON SIDE OF FLAT BED: A LITTLE
MOBILITY SCORE: 18
WALKING IN HOSPITAL ROOM: A LITTLE
DRESSING REGULAR UPPER BODY CLOTHING: A LITTLE
MOVING TO AND FROM BED TO CHAIR: A LITTLE
EATING MEALS: A LITTLE
HELP NEEDED FOR BATHING: A LOT
SUGGESTED CMS G CODE MODIFIER DAILY ACTIVITY: CK
DAILY ACTIVITIY SCORE: 16
STANDING UP FROM CHAIR USING ARMS: A LITTLE
TOILETING: A LOT
CLIMB 3 TO 5 STEPS WITH RAILING: A LITTLE
PERSONAL GROOMING: A LITTLE
SUGGESTED CMS G CODE MODIFIER MOBILITY: CK
TURNING FROM BACK TO SIDE WHILE IN FLAT BAD: A LITTLE

## 2022-10-31 ASSESSMENT — PAIN DESCRIPTION - PAIN TYPE
TYPE: ACUTE PAIN
TYPE: ACUTE PAIN

## 2022-10-31 NOTE — PROGRESS NOTES
Received report of patient at start of shift. Patient is AOx4, PRN oxycodone administered for complaints of pain. Assessment complete, patient on room air. C-collar in place. Patient ambulates with steady gait. Patient updated on plan of care, encouraged to notify staff for any needs/assistance. Call light within reach.

## 2022-10-31 NOTE — THERAPY
"Physical Therapy   Daily Treatment     Patient Name: Leobardo Young  Age:  62 y.o., Sex:  male  Medical Record #: [id number]  Today's Date: 10/31/2022     Precautions  Precautions: Fall Risk;Platform Weight Bearing Right Upper Extremity;Platform Weight Bearing Left Upper Extremity;Cervical Collar  ;Spinal / Back Precautions   Comments: c-collar AAT    Assessment    Pt progressing as expected, all acute PT goals met. Pt ambulated the hallway and negotiate 12 steps with no AD/LOB. Although pt no longer with acute PT needs, still recommend placement given nature of restrictions (BUE PWB restrictions, and cervical precautions) limiting his ability to safely perform simply daily tasks at home (such as turning a door nob) as pt has no assistance and remains at risk for falls and re-admission. If pt able to obtain daily assistance, then recommend HH. Encouraged pt to continue mobilizing while in house. Patient will not be actively followed for physical therapy services at this time, however may be seen if requested by physician for 1 more visit within 30 days to address any discharge or equipment needs.     Plan    Discharge secondary to goals met.    DC Equipment Recommendations: None  Discharge Recommendations: Recommend post-acute placement for additional physical therapy services prior to discharge home      Subjective    \"I'm getting better every day.\"      Objective     10/31/22 0955   Vitals   O2 Delivery Device None - Room Air   Pain 0 - 10 Group   Therapist Pain Assessment Nurse Notified;During Activity  (c/o pain with B hands in dependent position, agreeable to mobility)   Cognition    Cognition / Consciousness WDL   Level of Consciousness Alert   Comments Pleasant and cooperative   Active ROM Lower Body    Active ROM Lower Body  WDL   Strength Lower Body   Lower Body Strength  X   Comments grossly 4/5 BLE   Other Treatments   Other Treatments Provided Educated pt on placing BUE in dependent position up to 30 " min/day as pt currently ambulating with BUE at chest 2/2 pain with increased blood supply   Balance   Sitting Balance (Static) Fair +   Sitting Balance (Dynamic) Fair +   Standing Balance (Static) Fair   Standing Balance (Dynamic) Fair   Weight Shift Sitting Good   Weight Shift Standing Fair   Skilled Intervention Verbal Cuing   Comments no AD or LOB   Gait Analysis   Gait Level Of Assist Supervised   Assistive Device None   Distance (Feet) 500   # of Times Distance was Traveled 1   Deviation Bradykinetic   # of Stairs Climbed 12   Level of Assist with Stairs Supervised   Weight Bearing Status BUE PWB   Skilled Intervention Verbal Cuing;Tactile Cuing;Sequencing;Compensatory Strategies   Comments Improved overall confidence on feet. Slow to negotiate stairs   Bed Mobility    Supine to Sit Supervised   Sit to Supine   (up in chair post)   Scooting Supervised   Rolling Supervised   Functional Mobility   Sit to Stand Supervised   Bed, Chair, Wheelchair Transfer Supervised   Transfer Method Stand Step   Mobility no AD in clifford, stairs   Skilled Intervention Verbal Cuing   How much difficulty does the patient currently have...   Turning over in bed (including adjusting bedclothes, sheets and blankets)? 3   Sitting down on and standing up from a chair with arms (e.g., wheelchair, bedside commode, etc.) 3   Moving from lying on back to sitting on the side of the bed? 3   How much help from another person does the patient currently need...   Moving to and from a bed to a chair (including a wheelchair)? 3   Need to walk in a hospital room? 3   Climbing 3-5 steps with a railing? 3   6 clicks Mobility Score 18   Activity Tolerance   Sitting in Chair up post   Sitting Edge of Bed <5 min   Standing 10 min   Comments no overt s/s fatigue   Short Term Goals    Short Term Goal # 1 Pt will perform supine <> sit with SPV and HOB flat in 6 visits to get in/out of bed without assistance   Goal Outcome # 1 Goal met   Short Term Goal # 2  Pt will perform STS transfers with SPV in 6 visits to improve functional independence   Goal Outcome # 2 Goal met   Short Term Goal # 3 Pt will ambulate 250ft with SPV in 6 visits to access community distances   Goal Outcome # 3 Goal met   Short Term Goal # 4 Pt will negotiate 12 steps with SPV in 6 visits to safely enter/exit his 2nd story apartment   Goal Outcome # 4 Goal met   Education Group   Education Provided Stair Training;Gait Training   Gait Training Patient Response Patient;Acceptance;Explanation;Action Demonstration   Stair Training Patient Response Patient;Acceptance;Explanation;Action Demonstration;Reinforcement Needed   Anticipated Discharge Equipment and Recommendations   DC Equipment Recommendations None   Discharge Recommendations Recommend post-acute placement for additional physical therapy services prior to discharge home   Interdisciplinary Plan of Care Collaboration   IDT Collaboration with  Nursing   Patient Position at End of Therapy Seated;Call Light within Reach;Tray Table within Reach;Phone within Reach   Collaboration Comments Rn updated   Session Information   Date / Session Number  10/31- 4 (2/3, 10/31) Attempt 10/27

## 2022-10-31 NOTE — CARE PLAN
The patient is Stable - Low risk of patient condition declining or worsening    Shift Goals  Clinical Goals: Pain management, rest  Patient Goals: Pain management, rest  Family Goals: N/A    Progress made toward(s) clinical / shift goals:  PRN oxycodone administered for complaints of pain. Patient sleeping majority of shift.

## 2022-10-31 NOTE — CARE PLAN
The patient is Stable - Low risk of patient condition declining or worsening    Shift Goals  Clinical Goals: pain control  Patient Goals: Pain management, rest  Family Goals: N/A    Progress made toward(s) clinical / shift goals:      Report received from NOC RN.  Assessment complete.  A&O x 4. Patient calls appropriately.  Patient up with SB assist. Walks independently in halls. C collar on at all times.  Pain ok at this time.  Patient denies SOB.  Patient is on room air.  Denies N&V. Tolerating diet.  Skin: cast splints to bilateral upper arms CDI.  + void, + flatus, 10/30 BM.  Review plan with of care with patient.   Call light and personal belongings with in reach.   Hourly rounding in place.   All needs met at this time.     Patient is not progressing towards the following goals:

## 2022-10-31 NOTE — NON-PROVIDER
"    This note is intended for the purposes of medical student education and feedback only.   Please refer to the documentation by this patient's assigned medical practitioner for details of care and plans.    Reason for admission:  62 y.o. male admitted 10/13/2022 for facial fractures, spinal fracture, and b/l wrist fractures.    HD/POD#:   10/17 ORIF intrarticular distal radius fracture. ORIF L. Intraarticular distal radius fracture. ORIF ulnar neck fracture  10/18 ORIF B/L LeFort fractures     SUBJECTIVE  No overnight events.  Patient sitting up in bed, no complaints.   Pt reports feeling occasional drainage at the back of his throat. Sometimes produces blood-tinged mucous from this.   Pt reports diminished ROM and strength in the L. Hand as compared to the right. Pt reports 2/10 pain in L. Hand and no Pain in R. Hand.   Last BM was 10/30 and appeared normal , pt tolerating solids although reports feeling like his bite is \"off\". Pt is able to void w/o pain, uses condom catheter at night. Normal appetite, able to ambulate and participate in PT/OT.   Pt denies N/V/D/C  Pt denies SOB or chest pain.    OBJECTIVE   Vital Signs:  Max 24 hour temp:98.4  Current temp:97.4  Current HR:79  Current BP:128/73  Current RR:16  Current O2 Sat: 96    Physical Exam:  General: Pt well-appearing sitting in bed comfortably.   HEENT: C Collar in place, Ecchymosis around the orbits. Healing surgical incisions over the face. Mucous membranes intact. EOMI/PERRL  Cardiovascular:  RRR, no rubs, bruits, or murmurs  Respiratory: CTAB  Abdominal exam: Soft, nondistended, and nontender to palpation  Extremities: ROM intact, limited in hands. 2+ Pulses B/L. Cap refill <2s, sensations intact.  Neurological: No focal deficits appreciated.    Ins/Outs:  P/O Intake: 600 mL  IV Intake: None  Urine output:   Drain output: 2075 mL  Other output: None    Lab Results:  Recent Labs     10/31/22  0915   WBC 8.1   RBC 4.23*   HEMOGLOBIN 12.5*   HEMATOCRIT " 37.6*   MCV 88.9   MCH 29.6   MCHC 33.2*   RDW 40.8   PLATELETCT 395   MPV 9.1     Recent Labs     10/31/22  0915   SODIUM 134*   POTASSIUM 4.1   CHLORIDE 97   CO2 27   GLUCOSE 122*   BUN 13   CREATININE 0.67   CALCIUM 9.0                   ASSESSMENT/PLAN  Discharge planning issues- (present on admission)  Date of admission: 10/13/2022.  10/18 PM&R consulted, patient declined due to [employer] insurance.   10/20 Case management assisting with placement in the [place] area.  10/28 Acceptance to [facility] pending insurance.  Cleared for discharge: Yes - Date: 10/25.  Discharge delayed: Yes - Reason: Non-availability of Transfer Facility.  Discharge date: tbd.     Traumatic closed displaced fracture of distal end of radius and ulna, left, initial encounter- (present on admission)  Comminuted intra-articular distal radius fracture on left.    Significant displacement of large radial sided fracture fragment.  Comminuted fracture of distal ulna.  Splinted at referring facility.  Reduced in ED with conscious sedation.  CT bilateral wrists completed.  10/17 ORIF distal radius.  Weight bearing status - Platform weightbearing LUE. ADL's ok with hands under 5 lbs.  Sutures/Staples out 14 days post operatively (10/31).  Te Comer MD. Orthopedic Surgeon. [place]. and Deyvi Ocampo MD. Orthopedic Surgeon. [facility].       Closed fracture distal radius and ulna, right, initial encounter- (present on admission)  Comminuted dorsally angulated intra-articular distal radius fracture with impaction.    Mildly distracted fracture of the base of the ulnar styloid.  Splinted at referring facility.  Reduced in ED with conscious sedation.  CT bilateral wrists completed.  10/18 ORIF distal radius.  Weight bearing status - Platform weightbearing RUE. ADL's ok with hands under 5 lbs.  Sutures/Staples out 14 days post operatively (11/1).  Te Comer MD. Orthopedic Surgeon. [facility].        Closed fracture of third cervical vertebra, initial encounter (HCC)- (present on admission)  Fracture of the anterior inferior corner of the C3 vertebral body with slight anterior displacement approximately 3 mm.  Equivocal nondisplaced fracture line extending to superior endplate of the vertebral body.  Left C3-4 facet mildly widened with posterior displacement of C3 facet in relation to C4.  CTA neck within normal limits.  Non-operative management.   Cervical immobilization.   Follow up in clinic 6 weeks with AP / lateral cervical spine xrays.  Garrett Edgar MD, PhD. Neurosurgeon. [facility].         Multiple facial fractures, closed, initial encounter (HCC)- (present on admission)  Fractures of the anterior, medial and superior maxillary sinuses bilaterally, fracture of the right lateral maxillary sinus, bilateral nasal fractures.  Comminuted frontal sinus fracture which appears to involve superior orbital walls bilaterally.    Comminuted displaced fracture of the right superior medial orbit, no retro-orbital hematoma.  Bilateral pterygoid plate fractures.  LeForte III type fracture.  10/18 ORIF bilateral Le Fort fractures through multiple surgical approaches including interdental fixation.  Huey Ahuja MD. Plastic Surgeon. [facility].     No contraindication to deep vein thrombosis (DVT) prophylaxis- (present on admission)  Prophylactic anticoagulation for thrombotic prevention initially contraindicated secondary to elevated bleeding risk.  10/14 Prophylactic dose enoxaparin initiated.        Trauma- (present on admission)  Fall while hiking  Trauma Green Transfer Activation.  Marck Marquez MD. Trauma Surgery.     PROPHYLAXIS  DVT: Ambulate as tolerated, SCDs in place, Enoxaparin 30 mg  GI:Senna-Docusate, PEG, Zofran PRN  Abx: Bactrim 400-80 mg twice daily  Pain: Oxycodone 5 or 10mg PRN

## 2022-10-31 NOTE — DISCHARGE PLANNING
DC Transport Scheduled    Received request at: 10/31/2022    Transport Company Scheduled:  [employer]    Scheduled Date: Canceled Alt Transportation   Scheduled Time:     Destination: [place] [address]    Going into     Notified care team of scheduled transport via Vo[employer].     If there are any changes needed to the DC transportation scheduled, please contact [facility] at ext. [phone] between the hours of 6624-2717 Mon-Fri. If outside those hours, contact the ED Case Manager at ext. [phone].

## 2022-10-31 NOTE — PROGRESS NOTES
Trauma / Surgical Daily Progress Note    Date of Service  10/31/2022    Chief Complaint  62 y.o. male admitted 10/13/2022 with Trauma, hiking and fall, spinal fracture, facial fxs, bilateral wrists fxs    10/17 ORIF intraarticular distal radius fracture. ORIF left intraarticular distal radius fracture. ORIF ulnar neck fracture.     10/18 ORIF bilateral Le Fort fractures through multiple surgical approaches including interdental fixation.    Interval Events  No acute over night events.   Patient sitting up in bed. No complaints.     - Ortho follow up for staple/suture removal pending.   - Remains medically cleared for transfer.   - Updated recommendations from PT/OT.   - Discharge planning.     Review of Systems  Review of Systems   Constitutional:  Negative for chills, fever and malaise/fatigue.   Respiratory:  Negative for cough and shortness of breath.    Cardiovascular:  Negative for chest pain.   Gastrointestinal:  Negative for abdominal pain, nausea and vomiting.        BM 10/30   Genitourinary:         Voiding   Musculoskeletal:  Negative for myalgias.        Bilateral wrist pain      Vital Signs  Temp:  [36.3 °C (97.4 °F)-36.9 °C (98.4 °F)] 36.3 °C (97.4 °F)  Pulse:  [] 79  Resp:  [16-17] 16  BP: (124-136)/(73-80) 128/73  SpO2:  [93 %-96 %] 96 %    Physical Exam  Physical Exam  Vitals and nursing note reviewed.   Constitutional:       Appearance: Normal appearance.      Interventions: Cervical collar in place.   HENT:      Head: Normocephalic.      Comments: Healing ecchymosis and surgical incisions over face     Mouth/Throat:      Mouth: Mucous membranes are moist.   Eyes:      Extraocular Movements: Extraocular movements intact.      Pupils: Pupils are equal, round, and reactive to light.   Neck:      Comments: Small area of erythema and skin irritation at left lateral base of c-collar. Skin is intact  Cardiovascular:      Rate and Rhythm: Normal rate and regular rhythm.      Heart sounds: Normal  Received call from pt.  He states that he ate a chocolate chip granola bar and rechecked his blood sugar, which is now at 83.  He says that he is still feeling a little lightheaded, has diarrhea, and is sweating. Pt will eat another snack.    I don't see that buspar or paxil would cause decrease in blood sugar. Possible that his symptoms are not coming from his blood sugars, as they are still good?  Please advise.   heart sounds.   Pulmonary:      Effort: Pulmonary effort is normal. No respiratory distress.      Breath sounds: Normal breath sounds.   Abdominal:      General: There is no distension.      Palpations: Abdomen is soft.      Tenderness: There is no abdominal tenderness.   Genitourinary:     Comments: Condom catheter in place  Musculoskeletal:      Comments: Moves all extremities   BUE long arm splints   Skin:     General: Skin is warm and dry.   Neurological:      Mental Status: He is alert and oriented to person, place, and time.   Psychiatric:         Behavior: Behavior normal.     Laboratory  Recent Results (from the past 24 hour(s))   CBC WITH DIFFERENTIAL    Collection Time: 10/31/22  9:15 AM   Result Value Ref Range    WBC 8.1 4.8 - 10.8 K/uL    RBC 4.23 (L) 4.70 - 6.10 M/uL    Hemoglobin 12.5 (L) 14.0 - 18.0 g/dL    Hematocrit 37.6 (L) 42.0 - 52.0 %    MCV 88.9 81.4 - 97.8 fL    MCH 29.6 27.0 - 33.0 pg    MCHC 33.2 (L) 33.7 - 35.3 g/dL    RDW 40.8 35.9 - 50.0 fL    Platelet Count 395 164 - 446 K/uL    MPV 9.1 9.0 - 12.9 fL    Neutrophils-Polys 64.50 44.00 - 72.00 %    Lymphocytes 25.30 22.00 - 41.00 %    Monocytes 4.80 0.00 - 13.40 %    Eosinophils 1.00 0.00 - 6.90 %    Basophils 0.60 0.00 - 1.80 %    Immature Granulocytes 3.80 (H) 0.00 - 0.90 %    Nucleated RBC 0.00 /100 WBC    Neutrophils (Absolute) 5.24 1.82 - 7.42 K/uL    Lymphs (Absolute) 2.06 1.00 - 4.80 K/uL    Monos (Absolute) 0.39 0.00 - 0.85 K/uL    Eos (Absolute) 0.08 0.00 - 0.51 K/uL    Baso (Absolute) 0.05 0.00 - 0.12 K/uL    Immature Granulocytes (abs) 0.31 (H) 0.00 - 0.11 K/uL    NRBC (Absolute) 0.00 K/uL       Fluids    Intake/Output Summary (Last 24 hours) at 10/31/2022 0943  Last data filed at 10/31/2022 0850  Gross per 24 hour   Intake 600 ml   Output 2075 ml   Net -1475 ml       Core Measures & Quality Metrics  Medications reviewed, Labs reviewed and Radiology images reviewed  Barajas catheter: No Barajas      DVT Prophylaxis: Enoxaparin  (Lovenox)  DVT prophylaxis - mechanical: SCDs  Ulcer prophylaxis: Not indicated    Assessed for rehab: Patient was assess for and/or received rehabilitation services during this hospitalization  RAP Score Total: 5  CAGE Results: negative Blood Alcohol>0.08: no     Assessment/Plan  Discharge planning issues- (present on admission)  Assessment & Plan  Date of admission: 10/13/2022.  10/18 PM&R consulted, patient declined due to [employer] insurance.   10/20 Case management assisting with placement in the [place] area.  10/28 Acceptance to [facility] pending insurance.  Cleared for discharge: Yes - Date: 10/25.  Discharge delayed: Yes - Reason: Non-availability of Transfer Facility.  Discharge date: tbd.    Traumatic closed displaced fracture of distal end of radius and ulna, left, initial encounter- (present on admission)  Assessment & Plan  Comminuted intra-articular distal radius fracture on left.    Significant displacement of large radial sided fracture fragment.  Comminuted fracture of distal ulna.  Splinted at referring facility.  Reduced in ED with conscious sedation.  CT bilateral wrists completed.  10/17 ORIF distal radius.  Weight bearing status - Platform weightbearing LUE. ADL's ok with hands under 5 lbs.  Sutures/Staples out 14 days post operatively (10/31).  Te Comer MD. Orthopedic Surgeon. [facility]. and Deyvi Ocampo MD. Orthopedic Surgeon. [facility].      Closed fracture distal radius and ulna, right, initial encounter- (present on admission)  Assessment & Plan  Comminuted dorsally angulated intra-articular distal radius fracture with impaction.    Mildly distracted fracture of the base of the ulnar styloid.  Splinted at referring facility.  Reduced in ED with conscious sedation.  CT bilateral wrists completed.  10/18 ORIF distal radius.  Weight bearing status - Platform weightbearing RUE. ADL's ok with hands under 5 lbs.  Sutures/Staples out 14 days post operatively  (11/1).  Te Comer MD. Orthopedic Surgeon. [facility].      Closed fracture of third cervical vertebra, initial encounter (HCC)- (present on admission)  Assessment & Plan  Fracture of the anterior inferior corner of the C3 vertebral body with slight anterior displacement approximately 3 mm.  Equivocal nondisplaced fracture line extending to superior endplate of the vertebral body.  Left C3-4 facet mildly widened with posterior displacement of C3 facet in relation to C4.  CTA neck within normal limits.  Non-operative management.   Cervical immobilization.   Follow up in clinic 6 weeks with AP / lateral cervical spine xrays.  Garrett Edgar MD, PhD. Neurosurgeon. [facility].        Multiple facial fractures, closed, initial encounter (HCC)- (present on admission)  Assessment & Plan  Fractures of the anterior, medial and superior maxillary sinuses bilaterally, fracture of the right lateral maxillary sinus, bilateral nasal fractures.  Comminuted frontal sinus fracture which appears to involve superior orbital walls bilaterally.    Comminuted displaced fracture of the right superior medial orbit, no retro-orbital hematoma.  Bilateral pterygoid plate fractures.  LeForte III type fracture.  10/18 ORIF bilateral Le Fort fractures through multiple surgical approaches including interdental fixation.  Huey Ahuja MD. Plastic Surgeon. [facility].    No contraindication to deep vein thrombosis (DVT) prophylaxis- (present on admission)  Assessment & Plan  Prophylactic anticoagulation for thrombotic prevention initially contraindicated secondary to elevated bleeding risk.  10/14 Prophylactic dose enoxaparin initiated.       Trauma- (present on admission)  Assessment & Plan  Fall while hiking  Trauma Green Transfer Activation.  Marck Marquez MD. Trauma Surgery.     Discussed patient condition with RN, , Patient, and trauma surgery. Dr. STEVE Marquez.

## 2022-10-31 NOTE — DISCHARGE PLANNING
Case Management Discharge Planning    Admission Date: 10/13/2022  GMLOS: 3.3  ALOS: 18    6-Clicks ADL Score: 16  6-Clicks Mobility Score: 18  PT and/or OT Eval ordered: Yes  Post-acute Referrals Ordered: Yes  Post-acute Choice Obtained: Yes  Has referral(s) been sent to post-acute provider:  Yes      Anticipated Discharge Dispo: Discharge Disposition: D/T to SNF with Medicare cert in anticipation of skilled care (03)    DME Needed: No    Action(s) Taken: OTHER. Per Chelsie with [facility], insurance APPROVED. Chelsie reports patient can be accepted M -F before 5:00pm.     Per Chelsie, a Rapid Covid Test and a D/C Summary that includes all the discharge medications is required prior to transfer, Rajani joy.     LSW faxed the Transportation Form and Facesheet to Kendra Ridjaziel Line Coordinator. Per Kendra, [facility] is scheduled for 11/5/22 at 8:00am.     Kendra requested the Approved Services Form $2,259.82. LSW e-mailed the ASF to [facility] Director and JAIME Patricia CM for approval.    Accepting Facility: [facility]   Address: [address] [place] [address]  Room #: 8A  Accepting MD: Milton  RN To RN Report: [phone] (Ask for RN Brigid)    Escalations Completed: Approved Services Form escalated to [facility] for approval.    Medically Clear: Yes    Next Steps: Awaiting Covid Test. LSW to complete the transfer packet prior to transfer.     Barriers to Discharge: None    Is the patient up for discharge tomorrow: No

## 2022-11-01 ENCOUNTER — APPOINTMENT (OUTPATIENT)
Dept: RADIOLOGY | Facility: MEDICAL CENTER | Age: 62
DRG: 141 | End: 2022-11-01
Attending: PHYSICIAN ASSISTANT
Payer: COMMERCIAL

## 2022-11-01 PROCEDURE — 73100 X-RAY EXAM OF WRIST: CPT | Mod: LT

## 2022-11-01 PROCEDURE — A9270 NON-COVERED ITEM OR SERVICE: HCPCS | Performed by: SPECIALIST

## 2022-11-01 PROCEDURE — 770001 HCHG ROOM/CARE - MED/SURG/GYN PRIV*

## 2022-11-01 PROCEDURE — A9270 NON-COVERED ITEM OR SERVICE: HCPCS

## 2022-11-01 PROCEDURE — 700102 HCHG RX REV CODE 250 W/ 637 OVERRIDE(OP)

## 2022-11-01 PROCEDURE — 99232 SBSQ HOSP IP/OBS MODERATE 35: CPT | Performed by: PHYSICIAN ASSISTANT

## 2022-11-01 PROCEDURE — 700102 HCHG RX REV CODE 250 W/ 637 OVERRIDE(OP): Performed by: SURGERY

## 2022-11-01 PROCEDURE — A9270 NON-COVERED ITEM OR SERVICE: HCPCS | Performed by: SURGERY

## 2022-11-01 PROCEDURE — 700111 HCHG RX REV CODE 636 W/ 250 OVERRIDE (IP): Performed by: SURGERY

## 2022-11-01 PROCEDURE — 97535 SELF CARE MNGMENT TRAINING: CPT

## 2022-11-01 PROCEDURE — 700102 HCHG RX REV CODE 250 W/ 637 OVERRIDE(OP): Performed by: SPECIALIST

## 2022-11-01 PROCEDURE — 73100 X-RAY EXAM OF WRIST: CPT | Mod: RT

## 2022-11-01 RX ADMIN — DOCUSATE SODIUM 100 MG: 100 CAPSULE, LIQUID FILLED ORAL at 05:42

## 2022-11-01 RX ADMIN — AMLODIPINE BESYLATE 5 MG: 10 TABLET ORAL at 05:42

## 2022-11-01 RX ADMIN — GABAPENTIN 100 MG: 100 CAPSULE ORAL at 21:08

## 2022-11-01 RX ADMIN — ENOXAPARIN SODIUM 30 MG: 30 INJECTION SUBCUTANEOUS at 17:53

## 2022-11-01 RX ADMIN — OXYCODONE HYDROCHLORIDE 10 MG: 10 TABLET ORAL at 21:08

## 2022-11-01 RX ADMIN — FLUTICASONE PROPIONATE 100 MCG: 50 SPRAY, METERED NASAL at 05:42

## 2022-11-01 RX ADMIN — DOCUSATE SODIUM 100 MG: 100 CAPSULE, LIQUID FILLED ORAL at 17:53

## 2022-11-01 RX ADMIN — GABAPENTIN 100 MG: 100 CAPSULE ORAL at 05:42

## 2022-11-01 RX ADMIN — SULFAMETHOXAZOLE AND TRIMETHOPRIM 1 TABLET: 400; 80 TABLET ORAL at 05:42

## 2022-11-01 RX ADMIN — Medication 5 MG: at 21:08

## 2022-11-01 RX ADMIN — METAXALONE 400 MG: 800 TABLET ORAL at 17:53

## 2022-11-01 RX ADMIN — METAXALONE 400 MG: 800 TABLET ORAL at 05:42

## 2022-11-01 RX ADMIN — TRAZODONE HYDROCHLORIDE 50 MG: 50 TABLET ORAL at 21:08

## 2022-11-01 RX ADMIN — SULFAMETHOXAZOLE AND TRIMETHOPRIM 1 TABLET: 400; 80 TABLET ORAL at 17:54

## 2022-11-01 RX ADMIN — GABAPENTIN 100 MG: 100 CAPSULE ORAL at 14:02

## 2022-11-01 RX ADMIN — SENNOSIDES AND DOCUSATE SODIUM 1 TABLET: 50; 8.6 TABLET ORAL at 21:08

## 2022-11-01 RX ADMIN — METAXALONE 400 MG: 800 TABLET ORAL at 14:02

## 2022-11-01 RX ADMIN — ENOXAPARIN SODIUM 30 MG: 30 INJECTION SUBCUTANEOUS at 05:42

## 2022-11-01 RX ADMIN — OXYCODONE 5 MG: 5 TABLET ORAL at 17:53

## 2022-11-01 ASSESSMENT — ENCOUNTER SYMPTOMS
BLURRED VISION: 0
VOMITING: 0
ROS GI COMMENTS: BM 11/1
DIZZINESS: 0
MYALGIAS: 0
FOCAL WEAKNESS: 0
NAUSEA: 0
DOUBLE VISION: 0
HEADACHES: 0
EYE PAIN: 0
PHOTOPHOBIA: 1
SENSORY CHANGE: 0

## 2022-11-01 ASSESSMENT — PAIN DESCRIPTION - PAIN TYPE
TYPE: ACUTE PAIN
TYPE: ACUTE PAIN

## 2022-11-01 NOTE — DISCHARGE PLANNING
DC Transport Scheduled    Called Medical talked to Yuriy . They were not able to find pt. With ID. Use pt SS and it came up as [facility] [phone], [facility], stated to set up transfer for this pt. Provider has to send a request to them for wheelchair van transport, before they can get an auth/approve transportation.

## 2022-11-01 NOTE — THERAPY
"Occupational Therapy  Daily Treatment     Patient Name: Leobardo Young  Age:  62 y.o., Sex:  male  Medical Record #: [id number]  Today's Date: 11/1/2022       Precautions: Fall Risk, Platform Weight Bearing Right Upper Extremity, Platform Weight Bearing Left Upper Extremity, Cervical Collar  , Spinal / Back Precautions   Comments: C-collar on AAT    Assessment    Pt seen for OT tx.  Extensive education on various strategies to improve pt's independence with ADL's.  Pt continues to be unable to open most containers as he does not have the strength or dexterity to twist open items/containers.  Pt was able to use an electric razor to shave standing in front of sink.  Pt shown how to don/doff C-collar but he does not have the dexterity to do it by himself given his bilateral forearm casts.  Continue to recommend Post Acute placement as pt is not able to return to independent living in this state.     Plan    Continue current treatment plan.    DC Equipment Recommendations: Unable to determine at this time  Discharge Recommendations: Recommend post-acute placement for additional occupational therapy services prior to discharge home    Subjective    \"I am improving with my fingers but I still can't open most containers\"     Objective       10/31/22 1508   Cognition    Comments pt very receptive to new learning & has been trying to plan out how he will manage at home   Passive ROM Upper Body   Comments limited by bilateral casts   Active ROM Upper Body   Comments limited by bilateral casts   Other Treatments   Other Treatments Provided Lengthy session focused on how pt would manage opening various items for self feeding, grooming & bathing.  Pt showed how to don/doff C-collar but pt does not have to dexterity to don C-collar by himself   Balance   Sitting Balance (Static) Fair +   Sitting Balance (Dynamic) Fair +   Standing Balance (Static) Fair +   Standing Balance (Dynamic) Fair   Weight Shift Sitting Good   Weight " Shift Standing Good   Bed Mobility    Comments pt up in chair pre & post tx   Activities of Daily Living   Eating Modified Independent  (pt can self feed once food items have been opened)   Grooming Minimal Assist;Standing  (pt did better with visual cues from mirror, limited ability to open containers)   Upper Body Dressing Minimal Assist  (Min A to removal old T-shirt & don a new one)   Lower Body Dressing Supervision   Functional Mobility   Sit to Stand Supervised   Bed, Chair, Wheelchair Transfer Supervised   Toilet Transfers Supervised   Patient / Family Goals   Patient / Family Goal #1 to be more independent   Goal #1 Outcome Progressing as expected   Short Term Goals   Short Term Goal # 1 Pt will don gown w/ supv   Goal Outcome # 1 Progressing as expected   Short Term Goal # 2 Pt will feed self w/ supv and compensatory strategies PRN   Goal Outcome # 2 Progressing as expected   Short Term Goal # 3 Pt will perform toileting w/ min A   Goal Outcome # 3 Progressing as expected   Short Term Goal # 4 Pt will perfom g/h w/ supv and compensatory strategies PRN   Goal Outcome # 4 Progressing as expected

## 2022-11-01 NOTE — DISCHARGE PLANNING
Case Management Discharge Planning    Admission Date: 10/13/2022  GMLOS: 3.3  ALOS: 19    6-Clicks ADL Score: 16  6-Clicks Mobility Score: 18  PT and/or OT Eval ordered: Yes  Post-acute Referrals Ordered: Yes  Post-acute Choice Obtained: Yes  Has referral(s) been sent to post-acute provider:  Yes      Anticipated Discharge Dispo: Discharge Disposition: D/T to SNF with Medicare cert in anticipation of skilled care (03)    DME Needed: No    Action(s) Taken: OTHER. LSW spoke with Chelsie, Admissions Director at [facility], [place], [place]. LSW explained the transfer is scheduled for Saturday November 11, 2022 at 8:00am. Chelsie reports [facility] IS ABLE TO ACCEPT Saturday. Chelsie requested RN To RN Report and D/C Summary by Friday afternoon.    Belem, Manager of [facility] informed LSW, the Approved Services Form will be evaluated after [employer] clarifies [facility] Benefits for transportation.    11:20am - Per Peggy Rodriguez Coordinator, the Provider Request Form for Transportation Authorization has to be provided to [facility], form completed by KIM Gerard. LSW faxed the form to [employer] and [facility], Jennifer aware.    1:40pm - Received a call from Amalia,  at [facility], [place] - Network Transportation with [facility] [phone].  Amalia stated a request for transportation from [facility] to [facility] was received and she is searching for a crew able to do the transfer. Per Amalia's request, LSW faxed the Provider Request Form to FAX [phone].     2:15pm - Received a call from Amalia. Per Amalia [facility] received authorization from [facility]. Per Amalia, their Wheelchair Van Transportation Team will  patient Thursday November 3, 2022 at 8:00am. LSW spoke with Chelsie who confirm patient can be accepted Thursday, MD aware.    LSW send a Teams message to Peggy Rodriguez Coordinator regarding canceling the transfer via GMT scheduled for 11/5/22.    Accepting Facility: [facility]  [facility]  Transfer Date: 11/03/22  Transfer Time:  8:00am  Address: [address] [place] [address]  Room #: 8A  Accepting MD: Milton  RN To RN Report: [phone] (Ask for RN Brigid)    Escalations Completed: None    Medically Clear: Yes    Next Steps: Tuesday November 2, 2022, RN To RN Report to be completed. LSW to fax the D/C Summary to Lidia Olmedo.    Next Steps: Awaiting update from Amalia.    Barriers to Discharge: None    Is the patient up for discharge tomorrow: No

## 2022-11-01 NOTE — PROGRESS NOTES
Trauma / Surgical Daily Progress Note    Date of Service  11/1/2022    Chief Complaint  62 y.o. male admitted 10/13/2022 with Trauma, hiking and fall, spinal fracture, facial fxs, bilateral wrists fxs    10/17 ORIF intraarticular distal radius fracture. ORIF left intraarticular distal radius fracture. ORIF ulnar neck fracture.     10/18 ORIF bilateral Le Fort fractures through multiple surgical approaches including interdental fixation.    Interval Events  Ambulating halls this morning. No overnight events.   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 Currently resolved. Denies dizziness, headache, or loss of balance.   Accepted to [facility]. Transfer date tentatively scheduled for 11/5.     - Staple/suture removal per ortho team   - Discharge planning     Review of Systems  Review of Systems   Constitutional:  Negative for malaise/fatigue.   Eyes:  Positive for photophobia. Negative for blurred vision, double vision and pain.   Gastrointestinal:  Negative for nausea and vomiting.        BM 11/1   Musculoskeletal:  Negative for myalgias.   Neurological:  Negative for dizziness, sensory change, focal weakness and headaches.      Vital Signs  Temp:  [35.8 °C (96.5 °F)-36.4 °C (97.5 °F)] 36.4 °C (97.5 °F)  Pulse:  [73-99] 73  Resp:  [16-20] 18  BP: (122-137)/(72-83) 126/80  SpO2:  [93 %-97 %] 97 %    Physical Exam  Physical Exam  Vitals and nursing note reviewed.   Constitutional:       Appearance: Normal appearance.      Interventions: Cervical collar in place.   HENT:      Head: Normocephalic.      Comments: Healing surgical incisions over face     Mouth/Throat:      Mouth: Mucous membranes are moist.   Eyes:      Extraocular Movements: Extraocular movements intact.      Conjunctiva/sclera: Conjunctivae normal.      Pupils: Pupils are equal, round, and reactive to light.   Neck:      Comments: Small area of erythema and skin irritation at left lateral base  of c-collar. Skin is intact  Cardiovascular:      Rate and Rhythm: Normal rate and regular rhythm.      Heart sounds: Normal heart sounds.   Pulmonary:      Effort: Pulmonary effort is normal. No respiratory distress.      Breath sounds: Normal breath sounds.   Abdominal:      General: There is no distension.      Palpations: Abdomen is soft.      Tenderness: There is no abdominal tenderness.   Genitourinary:     Comments: Condom catheter in place  Musculoskeletal:      Comments: Moves all extremities   BUE long arm splints   Skin:     General: Skin is warm and dry.   Neurological:      Mental Status: He is alert and oriented to person, place, and time.   Psychiatric:         Behavior: Behavior normal.       Laboratory  Recent Results (from the past 24 hour(s))   SNF COVID Discharge (DRY NASAL SWAB IS REQUIRED)    Collection Time: 10/31/22  5:09 PM   Result Value Ref Range    SARS-CoV-2 Source Nasal Swab     SARS-COV ANTIGEN BOBO NotDetected NotDetected       Fluids    Intake/Output Summary (Last 24 hours) at 11/1/2022 1020  Last data filed at 10/31/2022 1200  Gross per 24 hour   Intake --   Output 300 ml   Net -300 ml       Core Measures & Quality Metrics  Medications reviewed, Labs reviewed and Radiology images reviewed  Barajas catheter: No Barajas      DVT Prophylaxis: Enoxaparin (Lovenox)  DVT prophylaxis - mechanical: SCDs  Ulcer prophylaxis: Not indicated    Assessed for rehab: Patient was assess for and/or received rehabilitation services during this hospitalization  RAP Score Total: 5  CAGE Results: negative Blood Alcohol>0.08: no     Assessment/Plan  Discharge planning issues- (present on admission)  Assessment & Plan  Date of admission: 10/13/2022.  10/18 PM&R consulted, patient declined due to [employer] insurance.   10/20 Case management assisting with placement in the [place] area.  11/1 Acceptance to [facility]. Transportation pending.   Cleared for discharge: Yes - Date: 10/25.  Discharge  delayed: Yes - Reason: transportation.  Discharge date: 11/5.    Traumatic closed displaced fracture of distal end of radius and ulna, left, initial encounter- (present on admission)  Assessment & Plan  Comminuted intra-articular distal radius fracture on left.    Significant displacement of large radial sided fracture fragment.  Comminuted fracture of distal ulna.  Splinted at referring facility.  Reduced in ED with conscious sedation.  CT bilateral wrists completed.  10/17 ORIF distal radius.  Weight bearing status - Platform weightbearing LUE. ADL's ok with hands under 5 lbs.  11/1 plan for suture/staple removal.   Te Comer MD. Orthopedic Surgeon. [facility]. and Deyvi Ocampo MD. Orthopedic Surgeon. [facility].      Closed fracture distal radius and ulna, right, initial encounter- (present on admission)  Assessment & Plan  Comminuted dorsally angulated intra-articular distal radius fracture with impaction.    Mildly distracted fracture of the base of the ulnar styloid.  Splinted at referring facility.  Reduced in ED with conscious sedation.  CT bilateral wrists completed.  10/18 ORIF distal radius.  Weight bearing status - Platform weightbearing RUE. ADL's ok with hands under 5 lbs.  11/1 plan for suture/staple removal.  Te Comer MD. Orthopedic Surgeon. [facility].      Closed fracture of third cervical vertebra, initial encounter (HCC)- (present on admission)  Assessment & Plan  Fracture of the anterior inferior corner of the C3 vertebral body with slight anterior displacement approximately 3 mm.  Equivocal nondisplaced fracture line extending to superior endplate of the vertebral body.  Left C3-4 facet mildly widened with posterior displacement of C3 facet in relation to C4.  CTA neck within normal limits.  Non-operative management.   Cervical immobilization.   Follow up in clinic 6 weeks with AP / lateral cervical spine xrays.  Garrett Edgar MD, PhD. Neurosurgeon.  [facility].        Multiple facial fractures, closed, initial encounter (HCC)- (present on admission)  Assessment & Plan  Fractures of the anterior, medial and superior maxillary sinuses bilaterally, fracture of the right lateral maxillary sinus, bilateral nasal fractures.  Comminuted frontal sinus fracture which appears to involve superior orbital walls bilaterally.    Comminuted displaced fracture of the right superior medial orbit, no retro-orbital hematoma.  Bilateral pterygoid plate fractures.  LeForte III type fracture.  10/18 ORIF bilateral Le Fort fractures through multiple surgical approaches including interdental fixation.  Huey Ahuja MD. Plastic Surgeon. [facility].    No contraindication to deep vein thrombosis (DVT) prophylaxis- (present on admission)  Assessment & Plan  Prophylactic anticoagulation for thrombotic prevention initially contraindicated secondary to elevated bleeding risk.  10/14 Prophylactic dose enoxaparin initiated.       Trauma- (present on admission)  Assessment & Plan  Fall while hiking  Trauma Green Transfer Activation.  Marck Marquez MD. Trauma Surgery.     Discussed patient condition with RN, , Patient, and trauma surgery. Dr. STEVE Marquez.

## 2022-11-01 NOTE — NON-PROVIDER
This note is intended for the purposes of medical student education and feedback only.   Please refer to the documentation by this patient's assigned medical practitioner for details of care and plans.    Reason for admission:  62 y.o. male admitted 10/13/2022 for facial fractures, spinal fracture, and b/l wrist fractures.    HD/POD#:   10/17 ORIF intrarticular distal radius fracture. ORIF L. Intraarticular distal radius fracture. ORIF ulnar neck fracture  10/18 ORIF B/L LeFort fractures     SUBJECTIVE  No overnight events.  Patient sitting up in bed, no complaints.   Pt scheduled for discharge 11/05 at 8am [place] [address]    Review of Systems   Constitutional:  Negative for chills, fever and malaise/fatigue.   Respiratory:  Negative for cough and shortness of breath.    Cardiovascular:  Negative for chest pain.   Gastrointestinal:  Negative for abdominal pain, nausea and vomiting. Last BM was 10/31  Genitourinary:  Negative for dysuria, hematuria   Musculoskeletal:  Negative for myalgias.     OBJECTIVE   Vital Signs:  Max 24 hour temp:97.5  Current temp:97.5  Current HR:73  Current BP:126/80  Current RR:18  Current O2 Sat: 97    Physical Exam:  General: Pt well-appearing sitting in bed comfortably.   HEENT: C Collar in place, Ecchymosis around the orbits. Healing surgical incisions over the face. Mucous membranes intact. EOMI/PERRL  Cardiovascular:  RRR, no rubs, bruits, or murmurs  Respiratory: CTAB  Abdominal exam: Soft, nondistended, and nontender to palpation  Extremities: ROM intact, limited in hands. 2+ Pulses B/L. Cap refill <2s, sensations intact.  Neurological: No focal deficits appreciated.    Ins/Outs:  P/O Intake: 600 mL  IV Intake: None  Urine output:   Drain output: 2075 mL  Other output: None    Lab Results:  Recent Labs     10/31/22  0915   WBC 8.1   RBC 4.23*   HEMOGLOBIN 12.5*   HEMATOCRIT 37.6*   MCV 88.9   MCH 29.6   MCHC 33.2*    RDW 40.8   PLATELETCT 395   MPV 9.1       Recent Labs     10/31/22  0915   SODIUM 134*   POTASSIUM 4.1   CHLORIDE 97   CO2 27   GLUCOSE 122*   BUN 13   CREATININE 0.67   CALCIUM 9.0                     ASSESSMENT/PLAN  Discharge planning issues- (present on admission)  Date of admission: 10/13/2022.  10/18 PM&R consulted, patient declined due to [employer] insurance.   10/20 Case management assisting with placement in the [place] area.  10/31 Acceptance to [facility]   Cleared for discharge: Yes - Date: 10/25.  Discharge delayed: Yes - Reason: Non-availability of Transfer Facility.  Discharge date: 11/05 0800   Traumatic closed displaced fracture of distal end of radius and ulna, left, initial encounter- (present on admission)  Comminuted intra-articular distal radius fracture on left.    Significant displacement of large radial sided fracture fragment.  Comminuted fracture of distal ulna.  Splinted at referring facility.  Reduced in ED with conscious sedation.  CT bilateral wrists completed.  10/17 ORIF distal radius.  Weight bearing status - Platform weightbearing LUE. ADL's ok with hands under 5 lbs.  Sutures/Staples out 14 days post operatively (10/31).  Te Comer MD. Orthopedic Surgeon. [facility]. and Deyvi Ocampo MD. Orthopedic Surgeon. [facility].       Closed fracture distal radius and ulna, right, initial encounter- (present on admission)  Comminuted dorsally angulated intra-articular distal radius fracture with impaction.    Mildly distracted fracture of the base of the ulnar styloid.  Splinted at referring facility.  Reduced in ED with conscious sedation.  CT bilateral wrists completed.  10/18 ORIF distal radius.  Weight bearing status - Platform weightbearing RUE. ADL's ok with hands under 5 lbs.  Sutures/Staples out 14 days post operatively (11/1).  Te Comer MD. Orthopedic Surgeon. [facility].       Closed fracture of third cervical vertebra, initial  encounter (HCC)- (present on admission)  Fracture of the anterior inferior corner of the C3 vertebral body with slight anterior displacement approximately 3 mm.  Equivocal nondisplaced fracture line extending to superior endplate of the vertebral body.  Left C3-4 facet mildly widened with posterior displacement of C3 facet in relation to C4.  CTA neck within normal limits.  Non-operative management.   Cervical immobilization.   Follow up in clinic 6 weeks with AP / lateral cervical spine xrays.  Garrett Edgar MD, PhD. Neurosurgeon. [facility].         Multiple facial fractures, closed, initial encounter (HCC)- (present on admission)  Fractures of the anterior, medial and superior maxillary sinuses bilaterally, fracture of the right lateral maxillary sinus, bilateral nasal fractures.  Comminuted frontal sinus fracture which appears to involve superior orbital walls bilaterally.    Comminuted displaced fracture of the right superior medial orbit, no retro-orbital hematoma.  Bilateral pterygoid plate fractures.  LeForte III type fracture.  10/18 ORIF bilateral Le Fort fractures through multiple surgical approaches including interdental fixation.  Huey Ahuja MD. Plastic Surgeon. [facility].     No contraindication to deep vein thrombosis (DVT) prophylaxis- (present on admission)  Prophylactic anticoagulation for thrombotic prevention initially contraindicated secondary to elevated bleeding risk.  10/14 Prophylactic dose enoxaparin initiated.        Trauma- (present on admission)  Fall while hiking  Trauma Green Transfer Activation.  Marck Marquez MD. Trauma Surgery.     PROPHYLAXIS  DVT: Ambulate as tolerated, SCDs in place, Enoxaparin 30 mg  GI:Senna-Docusate, PEG, Zofran PRN  Abx: Bactrim 400-80 mg twice daily  Pain: Oxycodone 5 or 10mg PRN

## 2022-11-01 NOTE — CARE PLAN
The patient is Stable - Low risk of patient condition declining or worsening    Shift Goals  Clinical Goals: pain control  Patient Goals: mobility  Family Goals: N/A    Progress made toward(s) clinical / shift goals:      Report received from NOC RN.  Assessment complete.  A&O x 4. Patient calls appropriately.  Patient up with SB assist. Walks independently in halls. C collar on at all times.  Pain ok at this time.  Patient denies SOB.  Patient is on room air.  Denies N&V. Tolerating diet.  Skin: cast splints to bilateral upper arms CDI.  + void, + flatus, 10/31 BM.  Review plan with of care with patient.   Call light and personal belongings with in reach.   Hourly rounding in place.   All needs met at this time.     Patient is not progressing towards the following goals:

## 2022-11-01 NOTE — THERAPY
"Occupational Therapy  Daily Treatment     Patient Name: Leobardo Young  Age:  62 y.o., Sex:  male  Medical Record #: [id number]  Today's Date: 11/1/2022       Precautions: Fall Risk, Platform Weight Bearing Right Upper Extremity, Platform Weight Bearing Left Upper Extremity, Cervical Collar  , Spinal / Back Precautions   Comments: C-collar on AAT    Assessment    Pt seen today following his cast removal & replacement of new ones.  Pt practiced don/doffing C-collar & exchange of pads.  Pt still required Min A due to limited hand/wrist function.  Pt's dirty pads were washed in soap & water & left out to air dry.  Pt advised he will need help with washing of his dirty C-collar pads due to bilateral wrist casts.  Pt did have a bit more finger ROM & opposition in his right hand following cast change.    Plan    Continue current treatment plan.    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  Pt was able to remove C-collar & change out pads with Min A.  Pt does not have the dexterity to correctly tighten the Ccollar into correct position   Patient / Family Goals   Patient / Family Goal #1 to be more independent   Goal #1 Outcome Progressing as expected   Short Term Goals   Short Term Goal # 1 Pt will don gown w/ supv   Goal Outcome # 1 Progressing as expected   Short Term Goal # 2 Pt will feed self w/ supv and compensatory " strategies PRN   Goal Outcome # 2 Progressing as expected   Short Term Goal # 3 Pt will perform toileting w/ min A   Goal Outcome # 3 Progressing as expected   Short Term Goal # 4 Pt will perfom g/h w/ supv and compensatory strategies PRN   Goal Outcome # 4 Progressing as expected

## 2022-11-01 NOTE — CARE PLAN
Problem: Knowledge Deficit - Standard  Goal: Patient and family/care givers will demonstrate understanding of plan of care, disease process/condition, diagnostic tests and medications  Outcome: Progressing     Problem: Pain - Standard  Goal: Alleviation of pain or a reduction in pain to the patient’s comfort goal  Outcome: Progressing       The patient is Stable - Low risk of patient condition declining or worsening    Shift Goals  Clinical Goals: pain control  Patient Goals: mobility  Family Goals: N/A    Progress made toward(s) clinical / shift goals: POC discussed with patient. Patient pain managed with prn medication per MAR.

## 2022-11-01 NOTE — PROGRESS NOTES
Bedside report received.  Assessment complete.  A&O x 4. Patient calls appropriately.  Patient ambulates with standby assist. Bed alarm off.   Patient has 7/10 pain. Medicated per MAR.  Casts in place to bilateral arms.  Tolerating regular diet. Denies N/V.  + void, + flatus. Last BM 10/30.  SCD's refused, patient ambulatory.  Reviewed plan of care with patient. Call light and personal belongings within reach. Hourly rounding in place. All needs met at this time.

## 2022-11-02 PROBLEM — Z78.9 NO CONTRAINDICATION TO DEEP VEIN THROMBOSIS (DVT) PROPHYLAXIS: Status: RESOLVED | Noted: 2022-10-13 | Resolved: 2022-11-02

## 2022-11-02 PROBLEM — Z02.9 DISCHARGE PLANNING ISSUES: Status: RESOLVED | Noted: 2022-10-20 | Resolved: 2022-11-02

## 2022-11-02 LAB
ANION GAP SERPL CALC-SCNC: 10 MMOL/L (ref 7–16)
BUN SERPL-MCNC: 12 MG/DL (ref 8–22)
CALCIUM SERPL-MCNC: 9 MG/DL (ref 8.5–10.5)
CHLORIDE SERPL-SCNC: 98 MMOL/L (ref 96–112)
CO2 SERPL-SCNC: 26 MMOL/L (ref 20–33)
CREAT SERPL-MCNC: 0.7 MG/DL (ref 0.5–1.4)
GFR SERPLBLD CREATININE-BSD FMLA CKD-EPI: 103 ML/MIN/1.73 M 2
GLUCOSE SERPL-MCNC: 103 MG/DL (ref 65–99)
POTASSIUM SERPL-SCNC: 4.2 MMOL/L (ref 3.6–5.5)
SODIUM SERPL-SCNC: 134 MMOL/L (ref 135–145)

## 2022-11-02 PROCEDURE — A9270 NON-COVERED ITEM OR SERVICE: HCPCS | Performed by: SURGERY

## 2022-11-02 PROCEDURE — 36415 COLL VENOUS BLD VENIPUNCTURE: CPT

## 2022-11-02 PROCEDURE — 700102 HCHG RX REV CODE 250 W/ 637 OVERRIDE(OP): Performed by: SURGERY

## 2022-11-02 PROCEDURE — 700102 HCHG RX REV CODE 250 W/ 637 OVERRIDE(OP)

## 2022-11-02 PROCEDURE — 770001 HCHG ROOM/CARE - MED/SURG/GYN PRIV*

## 2022-11-02 PROCEDURE — 80048 BASIC METABOLIC PNL TOTAL CA: CPT

## 2022-11-02 PROCEDURE — A9270 NON-COVERED ITEM OR SERVICE: HCPCS | Performed by: SPECIALIST

## 2022-11-02 PROCEDURE — A9270 NON-COVERED ITEM OR SERVICE: HCPCS

## 2022-11-02 PROCEDURE — 700102 HCHG RX REV CODE 250 W/ 637 OVERRIDE(OP): Performed by: SPECIALIST

## 2022-11-02 PROCEDURE — 700111 HCHG RX REV CODE 636 W/ 250 OVERRIDE (IP): Performed by: SURGERY

## 2022-11-02 PROCEDURE — 99239 HOSP IP/OBS DSCHRG MGMT >30: CPT | Performed by: NURSE PRACTITIONER

## 2022-11-02 RX ORDER — OXYCODONE HYDROCHLORIDE 10 MG/1
5-10 TABLET ORAL
Qty: 28 TABLET | Refills: 0 | Status: SHIPPED
Start: 2022-11-02 | End: 2022-11-09

## 2022-11-02 RX ORDER — PSEUDOEPHEDRINE HCL 30 MG
100 TABLET ORAL 2 TIMES DAILY
Qty: 60 CAPSULE | Status: SHIPPED
Start: 2022-11-02

## 2022-11-02 RX ORDER — METAXALONE 400 MG/1
400 TABLET ORAL 3 TIMES DAILY
Qty: 90 TABLET | Status: SHIPPED
Start: 2022-11-02

## 2022-11-02 RX ORDER — AMLODIPINE BESYLATE 5 MG/1
5 TABLET ORAL DAILY
Qty: 30 TABLET | Status: SHIPPED
Start: 2022-11-03

## 2022-11-02 RX ORDER — ACETAMINOPHEN 325 MG/1
650 TABLET ORAL EVERY 6 HOURS PRN
Qty: 30 TABLET | Refills: 0 | Status: SHIPPED
Start: 2022-11-02

## 2022-11-02 RX ORDER — CHLORHEXIDINE GLUCONATE ORAL RINSE 1.2 MG/ML
15 SOLUTION DENTAL 4 TIMES DAILY PRN
Status: SHIPPED
Start: 2022-11-02

## 2022-11-02 RX ORDER — FLUTICASONE PROPIONATE 50 MCG
2 SPRAY, SUSPENSION (ML) NASAL DAILY
Qty: 16 G | Status: SHIPPED
Start: 2022-11-03

## 2022-11-02 RX ORDER — BISACODYL 10 MG
10 SUPPOSITORY, RECTAL RECTAL
Refills: 0 | Status: SHIPPED
Start: 2022-11-02

## 2022-11-02 RX ORDER — POLYETHYLENE GLYCOL 3350 17 G/17G
17 POWDER, FOR SOLUTION ORAL 2 TIMES DAILY
Refills: 3 | Status: SHIPPED
Start: 2022-11-02

## 2022-11-02 RX ORDER — ENOXAPARIN SODIUM 100 MG/ML
30 INJECTION SUBCUTANEOUS EVERY 12 HOURS
Status: SHIPPED
Start: 2022-11-02

## 2022-11-02 RX ORDER — ONDANSETRON 4 MG/1
4 TABLET, ORALLY DISINTEGRATING ORAL EVERY 4 HOURS PRN
Qty: 10 TABLET | Refills: 0 | Status: SHIPPED
Start: 2022-11-02

## 2022-11-02 RX ORDER — AMOXICILLIN 250 MG
1 CAPSULE ORAL
Qty: 30 TABLET | Refills: 0 | Status: SHIPPED
Start: 2022-11-02

## 2022-11-02 RX ORDER — POTASSIUM CHLORIDE 20 MEQ/1
40 TABLET, EXTENDED RELEASE ORAL 3 TIMES DAILY
Qty: 60 TABLET | Refills: 11 | Status: SHIPPED
Start: 2022-11-02

## 2022-11-02 RX ORDER — CHOLECALCIFEROL (VITAMIN D3) 125 MCG
5 CAPSULE ORAL NIGHTLY
Qty: 30 TABLET | Status: SHIPPED
Start: 2022-11-02

## 2022-11-02 RX ORDER — TRAZODONE HYDROCHLORIDE 50 MG/1
50 TABLET ORAL
Qty: 30 TABLET | Refills: 3 | Status: SHIPPED
Start: 2022-11-02

## 2022-11-02 RX ORDER — GABAPENTIN 100 MG/1
100 CAPSULE ORAL EVERY 8 HOURS
Qty: 90 CAPSULE | Status: SHIPPED
Start: 2022-11-02

## 2022-11-02 RX ORDER — CELECOXIB 200 MG/1
200 CAPSULE ORAL 2 TIMES DAILY PRN
Qty: 60 CAPSULE | Status: SHIPPED
Start: 2022-11-02

## 2022-11-02 RX ADMIN — SULFAMETHOXAZOLE AND TRIMETHOPRIM 1 TABLET: 400; 80 TABLET ORAL at 06:02

## 2022-11-02 RX ADMIN — TRAZODONE HYDROCHLORIDE 50 MG: 50 TABLET ORAL at 20:35

## 2022-11-02 RX ADMIN — DOCUSATE SODIUM 100 MG: 100 CAPSULE, LIQUID FILLED ORAL at 06:02

## 2022-11-02 RX ADMIN — OXYCODONE 5 MG: 5 TABLET ORAL at 14:08

## 2022-11-02 RX ADMIN — ENOXAPARIN SODIUM 30 MG: 30 INJECTION SUBCUTANEOUS at 06:02

## 2022-11-02 RX ADMIN — Medication 5 MG: at 20:35

## 2022-11-02 RX ADMIN — OXYCODONE 5 MG: 5 TABLET ORAL at 17:18

## 2022-11-02 RX ADMIN — SENNOSIDES AND DOCUSATE SODIUM 1 TABLET: 50; 8.6 TABLET ORAL at 20:35

## 2022-11-02 RX ADMIN — OXYCODONE 5 MG: 5 TABLET ORAL at 06:04

## 2022-11-02 RX ADMIN — GABAPENTIN 100 MG: 100 CAPSULE ORAL at 13:19

## 2022-11-02 RX ADMIN — METAXALONE 400 MG: 800 TABLET ORAL at 06:02

## 2022-11-02 RX ADMIN — ENOXAPARIN SODIUM 30 MG: 30 INJECTION SUBCUTANEOUS at 17:17

## 2022-11-02 RX ADMIN — OXYCODONE 5 MG: 5 TABLET ORAL at 21:07

## 2022-11-02 RX ADMIN — GABAPENTIN 100 MG: 100 CAPSULE ORAL at 06:02

## 2022-11-02 RX ADMIN — METAXALONE 400 MG: 800 TABLET ORAL at 17:18

## 2022-11-02 RX ADMIN — DOCUSATE SODIUM 100 MG: 100 CAPSULE, LIQUID FILLED ORAL at 17:18

## 2022-11-02 RX ADMIN — AMLODIPINE BESYLATE 5 MG: 10 TABLET ORAL at 06:02

## 2022-11-02 RX ADMIN — FLUTICASONE PROPIONATE 100 MCG: 50 SPRAY, METERED NASAL at 06:03

## 2022-11-02 RX ADMIN — METAXALONE 400 MG: 800 TABLET ORAL at 13:19

## 2022-11-02 RX ADMIN — GABAPENTIN 100 MG: 100 CAPSULE ORAL at 20:35

## 2022-11-02 ASSESSMENT — ENCOUNTER SYMPTOMS
NEUROLOGICAL NEGATIVE: 1
PSYCHIATRIC NEGATIVE: 1
ROS GI COMMENTS: BM 11/2
RESPIRATORY NEGATIVE: 1
MYALGIAS: 1

## 2022-11-02 ASSESSMENT — PAIN DESCRIPTION - PAIN TYPE
TYPE: ACUTE PAIN

## 2022-11-02 NOTE — PROGRESS NOTES
Bedside report received.  Assessment complete.  A&O x 4. Patient calls appropriately.  Patient ambulates with standby assist. Bed alarm off.   Patient has 7/10 pain. Medicated per MAR.  Casts in place to bilateral arms.  Tolerating regular diet. Denies N/V.  + void, + flatus, + BM.  SCD's refused, patient ambulatory.  Reviewed plan of care with patient. Call light and personal belongings within reach. Hourly rounding in place. All needs met at this time

## 2022-11-02 NOTE — CARE PLAN
The patient is Stable - Low risk of patient condition declining or worsening    Shift Goals  Clinical Goals: pain control, mobility  Patient Goals: mobility  Family Goals: N/A    Progress made toward(s) clinical / shift goals:      Report received from NOC RN.  Assessment complete.  A&O x 4. Patient calls appropriately.  Patient up with SB assist. Walks independently in halls. C collar on at all times.  Pain ok at this time.  Patient denies SOB.  Patient is on room air.  Denies N&V. Tolerating diet.  Skin: cast splints to bilateral upper arms CDI, changed yesterday by ortho.  + void, 11/2 BM.  Review plan with of care with patient.   Call light and personal belongings with in reach.   Hourly rounding in place.   All needs met at this time.     Patient is not progressing towards the following goals:

## 2022-11-02 NOTE — PROGRESS NOTES
Patient casted  Stable for transfer  Needs to see ortho hand in 4 weeks  Xray on right might have a wire migrated intraarticular  May need plate removal on right after fracture healing

## 2022-11-02 NOTE — DISCHARGE PLANNING
Case Management Discharge Planning    Admission Date: 10/13/2022  GMLOS: 3.3  ALOS: 20    6-Clicks ADL Score: 16  6-Clicks Mobility Score: 18  PT and/or OT Eval ordered: Yes  Post-acute Referrals Ordered: Yes  Post-acute Choice Obtained: Yes  Has referral(s) been sent to post-acute provider:  Yes      Anticipated Discharge Dispo: Discharge Disposition: D/T to SNF with Medicare cert in anticipation of skilled care (03)    DME Needed: No    Action(s) Taken: OTHER. LSW spoke with Amalia, Intake with [facility]. Per Amalia, [facility] will be at bedside tomorrow 11/3/22 between 6:00am - 6:30am to  this patient and transport him to [facility] in [place], [place], LSW notified Chelsie, Admissions Director with [facility].    LSW faxed the Discharge Summary to Chelsie [phone], fax confirmation received. LSW completed the Transfer Packet and the Cobra Form,  packet placed in the drawer outside patient's room, RN Ludivina joy.    LSW met with patient and explained the transfer arrangements, patient agreed.    Accepting Facility: [facility]  Transfer Date: 11/03/22  Transfer Time:  6:00am - 6:30am  Address: [address] [place] [address]  Room #: 8A  Accepting MD: Milton  RN To RN Report: [phone] (Ask for JAIME Rainey)    Escalations Completed: None    Medically Clear: Yes    Next Steps: Patient to transfer tomorrow 11/3/22 to [facility].    Barriers to Discharge: None    Is the patient up for discharge tomorrow: Yes

## 2022-11-02 NOTE — PROGRESS NOTES
Trauma / Surgical Daily Progress Note    Date of Service  11/2/2022    Chief Complaint  62 y.o. male admitted 10/13/2022 as a trauma green xfer - [facility] - Facial fractures, bilateral wrist fractures and non op cervical fracture  POD # 16 - Left wrist repair  POD # 15 - face and right wrist repair     Interval Events  Adequate pain control  Room air  Potassium 4.2 - continue current supplementation   On Lovenox  Medically cleared for transfer to SNF in [place] tomorrow     Review of Systems  Review of Systems   Constitutional:  Positive for malaise/fatigue.   HENT: Negative.     Respiratory: Negative.     Gastrointestinal:         BM 11/2   Genitourinary:         Voiding   Musculoskeletal:  Positive for myalgias.   Neurological: Negative.    Psychiatric/Behavioral: Negative.     All other systems reviewed and are negative.     Vital Signs  Temp:  [36.1 °C (97 °F)-36.4 °C (97.6 °F)] 36.4 °C (97.5 °F)  Pulse:  [] 83  Resp:  [17-20] 18  BP: (130-143)/(75-89) 139/78  SpO2:  [93 %-96 %] 93 %    Physical Exam  Physical Exam  Vitals and nursing note reviewed.   Constitutional:       General: He is not in acute distress.     Appearance: Normal appearance.      Interventions: Cervical collar in place.   HENT:      Head:      Comments: Faint evolving facial brusing     Right Ear: External ear normal.      Left Ear: External ear normal.      Nose: Nose normal.      Mouth/Throat:      Mouth: Mucous membranes are moist.      Pharynx: Oropharynx is clear.   Eyes:      General:         Right eye: No discharge.         Left eye: No discharge.      Pupils: Pupils are equal, round, and reactive to light.   Cardiovascular:      Rate and Rhythm: Normal rate.   Pulmonary:      Effort: Pulmonary effort is normal. No respiratory distress.      Breath sounds: Normal breath sounds.   Abdominal:      General: There is no distension.   Musculoskeletal:         General: Tenderness and signs of injury present.      Cervical back: Tenderness  present. No muscular tenderness.      Comments: Bilateral wrists in splints  Left fingers edematous  Distal sensation intact bilateral   Skin:     General: Skin is warm.      Capillary Refill: Capillary refill takes less than 2 seconds.   Neurological:      General: No focal deficit present.      Mental Status: He is alert and oriented to person, place, and time.   Psychiatric:         Mood and Affect: Mood normal.         Behavior: Behavior normal.         Thought Content: Thought content normal.     Core Measures & Quality Metrics  Medications reviewed and Labs reviewed  Barajas catheter: No Barajas      DVT Prophylaxis: Enoxaparin (Lovenox)  DVT prophylaxis - mechanical: SCDs  Ulcer prophylaxis: Not indicated    Assessed for rehab: Patient was assess for and/or received rehabilitation services during this hospitalization  RAP Score Total: 5  CAGE Results: negative Blood Alcohol>0.08: no     Assessment/Plan  Discharge planning issues- (present on admission)  Assessment & Plan  Date of admission: 10/13/2022.  10/18 PM&R consulted, patient declined due to [employer] insurance.   10/20 Case management assisting with placement in the [place] area.  11/1 Acceptance to [facility]. Transportation pending.   Cleared for discharge: Yes - Date: 10/25.  Discharge delayed: Yes - Reason: transportation.  Discharge date: 11/5.    Traumatic closed displaced fracture of distal end of radius and ulna, left, initial encounter- (present on admission)  Assessment & Plan  Comminuted intra-articular distal radius fracture on left.    Significant displacement of large radial sided fracture fragment.  Comminuted fracture of distal ulna.  Splinted at referring facility.  Reduced in ED with conscious sedation.  CT bilateral wrists completed.  10/17 ORIF distal radius.  Weight bearing status - Platform weightbearing LUE. ADL's ok with hands under 5 lbs.  11/1 plan for suture/staple removal.   Te Comer MD. Orthopedic Surgeon. [facility]. and Deyvi Ocampo MD. Orthopedic Surgeon. [facility] [place].      Closed fracture distal radius and ulna, right, initial encounter- (present on admission)  Assessment & Plan  Comminuted dorsally angulated intra-articular distal radius fracture with impaction.    Mildly distracted fracture of the base of the ulnar styloid.  Splinted at referring facility.  Reduced in ED with conscious sedation.  CT bilateral wrists completed.  10/18 ORIF distal radius.  Weight bearing status - Platform weightbearing RUE. ADL's ok with hands under 5 lbs.  11/1 plan for suture/staple removal.  Te Comer MD. Orthopedic Surgeon. [facility] [place].      Closed fracture of third cervical vertebra, initial encounter (HCC)- (present on admission)  Assessment & Plan  Fracture of the anterior inferior corner of the C3 vertebral body with slight anterior displacement approximately 3 mm.  Equivocal nondisplaced fracture line extending to superior endplate of the vertebral body.  Left C3-4 facet mildly widened with posterior displacement of C3 facet in relation to C4.  CTA neck within normal limits.  Non-operative management.   Cervical immobilization.   Follow up in clinic 6 weeks with AP / lateral cervical spine xrays.  Garrett Edgar MD, PhD. Neurosurgeon. [facility].        Multiple facial fractures, closed, initial encounter (HCC)- (present on admission)  Assessment & Plan  Fractures of the anterior, medial and superior maxillary sinuses bilaterally, fracture of the right lateral maxillary sinus, bilateral nasal fractures.  Comminuted frontal sinus fracture which appears to involve superior orbital walls bilaterally.    Comminuted displaced fracture of the right superior medial orbit, no retro-orbital hematoma.  Bilateral pterygoid plate fractures.  LeForte III type fracture.  10/18 ORIF bilateral Le Fort fractures through multiple surgical approaches including interdental fixation.  Huey CUETO  MD Leigha. Plastic Surgeon. [facility].    No contraindication to deep vein thrombosis (DVT) prophylaxis- (present on admission)  Assessment & Plan  Prophylactic anticoagulation for thrombotic prevention initially contraindicated secondary to elevated bleeding risk.  10/14 Prophylactic dose enoxaparin initiated.       Trauma- (present on admission)  Assessment & Plan  Fall while hiking  Trauma Green Transfer Activation.  Marck Marquez MD. Trauma Surgery.     Discussed patient condition with RN, Patient, and trauma surgery. Dr. Marquez

## 2022-11-02 NOTE — DISCHARGE SUMMARY
Trauma Discharge Summary    DATE OF ADMISSION: 10/13/2022    DATE OF DISCHARGE: 11/3/2022    LENGTH OF STAY: 21 days    ATTENDING PHYSICIAN: Marck Marquez M.D.    CONSULTING PHYSICIAN:   1. Garrett Edgar MD, Neurosurgery   2. Apolinar Ocampo MD Orthopedics   3. Huey Ahuja MD, Plastics     DISCHARGE DIAGNOSIS:  Active Problems:    Multiple facial fractures, closed, initial encounter ([facility]) POA: Yes    Closed fracture of third cervical vertebra, initial encounter ([facility]) POA: Yes    Closed fracture distal radius and ulna, right, initial encounter POA: Yes    Traumatic closed displaced fracture of distal end of radius and ulna, left, initial encounter POA: Yes    Trauma POA: Yes  Resolved Problems:    Discharge planning issues POA: Yes    Finger dislocation, initial encounter POA: Yes    No contraindication to deep vein thrombosis (DVT) prophylaxis POA: Yes    Lip laceration POA: Yes    Urinary retention POA: Yes    Leukocytosis POA: Yes    PROCEDURES:  1.  On October 17 2022 Dr. Te Comer performed an open reduction and internal fixation of the right intra-articular distal radius fracture as well as an open reduction internal fixation of the left intra-articular distal radius fracture and an open reduction internal fixation of the ulnar neck fracture  2.  On October 18, 2022 Dr. Huey Ahuja performed an open reduction and internal fixation of bilateral Le Fort fractures to multiple surgical approaches including interdental fixation.    HISTORY OF PRESENT ILLNESS: The patient is a 62 y.o. male who was reportedly injured when he had a mechanical ground-level fall while hiking..  He was transferred to [facility] in [place], [place] being evaluated in the [facility] area.    HOSPITAL COURSE: The patient was triaged as a partial trauma activation. The [facility] was [facility] to the [facility].  He remained in the [facility] for his entire stay.  He underwent the above procedures.  He did suffer some urinary  retention which has resolved.  He did suffer some leukocytosis.  Work-up was positive for a urinary tract infection.  He did complete Septra DS x7 days.  He has been participating in therapies while in the hospital.  And as of today he is tolerating a regular diet.  He has adequate pain control.  He is on room air.  He is participating in therapies.  His last bowel movement was November 2.  He is stable for transfer to a skilled facility where he will continue to progress with his activities of daily living to be able to return to his previously independent living situation.    HOSPITAL PROBLEM LIST:  Discharge planning issues- (present on admission)  Assessment & Plan  Date of admission: 10/13/2022.  10/18 PM&R consulted, patient declined due to [employer] insurance.   10/20 Case management assisting with placement in the [place] area.  11/1 Acceptance to [facility]. Transportation pending.   Cleared for discharge: Yes - Date: 10/25.  Discharge delayed: Yes - Reason: transportation.  Discharge date: 11/5.    Traumatic closed displaced fracture of distal end of radius and ulna, left, initial encounter- (present on admission)  Assessment & Plan  Comminuted intra-articular distal radius fracture on left.    Significant displacement of large radial sided fracture fragment.  Comminuted fracture of distal ulna.  Splinted at referring facility.  Reduced in ED with conscious sedation.  CT bilateral wrists completed.  10/17 ORIF distal radius.  Weight bearing status - Platform weightbearing LUE. ADL's ok with hands under 5 lbs.  11/1 plan for suture/staple removal.   Te Comer MD. Orthopedic Surgeon. [facility]. and Deyvi Ocampo MD. Orthopedic Surgeon. [facility].      Closed fracture distal radius and ulna, right, initial encounter- (present on admission)  Assessment & Plan  Comminuted dorsally angulated intra-articular distal radius fracture with impaction.    Mildly distracted fracture of  the base of the ulnar styloid.  Splinted at referring facility.  Reduced in ED with conscious sedation.  CT bilateral wrists completed.  10/18 ORIF distal radius.  Weight bearing status - Platform weightbearing RUE. ADL's ok with hands under 5 lbs.  11/1 plan for suture/staple removal.  Te Comer MD. Orthopedic Surgeon. [facility].      Closed fracture of third cervical vertebra, initial encounter (HCC)- (present on admission)  Assessment & Plan  Fracture of the anterior inferior corner of the C3 vertebral body with slight anterior displacement approximately 3 mm.  Equivocal nondisplaced fracture line extending to superior endplate of the vertebral body.  Left C3-4 facet mildly widened with posterior displacement of C3 facet in relation to C4.  CTA neck within normal limits.  Non-operative management.   Cervical immobilization.   Follow up in clinic 6 weeks with AP / lateral cervical spine xrays.  Garrett Edgar MD, PhD. Neurosurgeon. [facility].        Multiple facial fractures, closed, initial encounter (HCC)- (present on admission)  Assessment & Plan  Fractures of the anterior, medial and superior maxillary sinuses bilaterally, fracture of the right lateral maxillary sinus, bilateral nasal fractures.  Comminuted frontal sinus fracture which appears to involve superior orbital walls bilaterally.    Comminuted displaced fracture of the right superior medial orbit, no retro-orbital hematoma.  Bilateral pterygoid plate fractures.  LeForte III type fracture.  10/18 ORIF bilateral Le Fort fractures through multiple surgical approaches including interdental fixation.  Huey Ahuja MD. Plastic Surgeon. [facility].    No contraindication to deep vein thrombosis (DVT) prophylaxis- (present on admission)  Assessment & Plan  Prophylactic anticoagulation for thrombotic prevention initially contraindicated secondary to elevated bleeding risk.  10/14 Prophylactic dose  enoxaparin initiated.       Trauma- (present on admission)  Assessment & Plan  Fall while hiking  Trauma Green Transfer Activation.  Marck Marquez MD. Trauma Surgery.     Leukocytosis-resolved as of 10/29/2022, (present on admission)  Assessment & Plan  10/24 WBC 13.1. Afebrile. Nontoxic appearance.  - CXR stable. Duplex negative for DVT.  10/26 WBC 15.  - CXR negative   UA positive - initiate Septra DS x 7 days    Urinary retention-resolved as of 10/20/2022, (present on admission)  Assessment & Plan  10/14 Barajas placed for retention  10/15 Start Flomax   10/20 Trial Barajas removal    Lip laceration-resolved as of 10/29/2022, (present on admission)  Assessment & Plan  Repaired in ED with absorbable sutures.    Finger dislocation, initial encounter-resolved as of 10/29/2022, (present on admission)  Assessment & Plan  Dorsal dislocation of the PIP joint of the long finger without associated fracture.  Reduced at referring facility.           DISPOSITION: Discharged to a skilled nursing facility on 11/3/2022. The patient was counseled and questions were answered. Specifically, signs and symptoms of infection, respiratory decompensation, new condition or worsening condition and persistent or worsening pain were discussed and the patient agrees to seek medical attention if any of these develop.    DISCHARGE MEDICATIONS:  The patients controlled substance history was reviewed and a controlled substance use informed consent (if applicable) was provided by [facility] and the patient has been prescribed.     Medication List        START taking these medications        Instructions   acetaminophen 325 MG Tabs  Commonly known as: Tylenol   Take 2 Tablets by mouth every 6 hours as needed for Mild Pain or Moderate Pain.  Dose: 650 mg     amLODIPine 5 MG Tabs  Start taking on: November 3, 2022  Commonly known as: NORVASC   Take 1 Tablet by mouth every day.  Dose: 5 mg     bisacodyl 10 MG Supp  Commonly  known as: DULCOLAX   Insert 1 Suppository into the rectum every 24 hours as needed (if magnesium hydroxide ineffective).  Dose: 10 mg     celecoxib 200 MG Caps  Commonly known as: CELEBREX   Take 1 Capsule by mouth 2 times a day as needed for Mild Pain, Moderate Pain or Inflammation.  Dose: 200 mg     chlorhexidine 0.12 % Soln  Commonly known as: PERIDEX   Take 15 mL by mouth 4 times a day as needed (for oral surgery care).  Dose: 15 mL     docusate sodium 100 MG Caps   Take 100 mg by mouth 2 times a day.  Dose: 100 mg     enoxaparin 30 MG/0.3ML Sosy inj  Commonly known as: Lovenox   Inject 0.3 mL under the skin every 12 hours.  Dose: 30 mg     fluticasone 50 MCG/ACT nasal spray  Start taking on: November 3, 2022  Commonly known as: FLONASE   Administer 2 Sprays into affected nostril(S) every day.  Dose: 2 Spray     gabapentin 100 MG Caps  Commonly known as: NEURONTIN   Take 1 Capsule by mouth every 8 hours.  Dose: 100 mg     magnesium hydroxide 400 MG/5ML Susp  Start taking on: November 3, 2022  Commonly known as: MILK OF MAGNESIA   Take 30 mL by mouth every day.  Dose: 30 mL     melatonin 5 mg Tabs   Take 1 Tablet by mouth every evening.  Dose: 5 mg     metaxalone 400 MG Tabs  Commonly known as: Skelaxin   Take 1 Tablet by mouth 3 times a day.  Dose: 400 mg     ondansetron 4 MG Tbdp  Commonly known as: ZOFRAN ODT   Take 1 Tablet by mouth every four hours as needed for Nausea.  Dose: 4 mg     oxyCODONE immediate release 10 MG immediate release tablet  Commonly known as: ROXICODONE   Take 0.5-1 Tablets by mouth every 3 hours as needed for Moderate Pain or Severe Pain for up to 7 days.  Dose: 5-10 mg     polyethylene glycol/lytes 17 g Pack  Commonly known as: MIRALAX   Take 1 Packet by mouth 2 times a day.  Dose: 17 g     potassium chloride SA 20 MEQ Tbcr  Commonly known as: Kdur   Take 2 Tablets by mouth 3 times a day.  Dose: 40 mEq     senna-docusate 8.6-50 MG Tabs  Commonly known as: PERICOLACE or SENOKOT S   Take  1 Tablet by mouth every 24 hours as needed for Constipation.  Dose: 1 Tablet     traZODone 50 MG Tabs  Commonly known as: DESYREL   Take 1 Tablet by mouth at bedtime.  Dose: 50 mg            CONTINUE taking these medications        Instructions   FISH OIL PO   Take 2 Capsules by mouth every day.  Dose: 2 Capsule     VITAMIN D-3 PO   Take 1 Tablet by mouth every day.  Dose: 1 Tablet              ACTIVITY:  Arm weightbearing on bilateral upper extremities.  These of daily living okay with hands under 5 pounds.  Cervical collar on at all times with recommended follow-up AP and lateral cervical spine imaging at 6 weeks postinjury.    WOUND CARE:  Over-the-counter antibiotic ointment as needed    DIET:  Orders Placed This Encounter   Procedures    Diet Order Diet: Level 6 - Soft and Bite Sized (Gatorade, per pt request); Liquid level: Level 0 - Thin     Standing Status:   Standing     Number of Occurrences:   1     Order Specific Question:   Diet:     Answer:   Level 6 - Soft and Bite Sized [23]     Comments:   Heriberto, per pt request     Order Specific Question:   Liquid level     Answer:   Level 0 - Thin       FOLLOW UP:  [facility]  [address] [facility] [address]  [phone]  Follow up  Call to make an appt with the office in 2 weeks.    Garrett Edgar M.D.  [address]  [facility] [address] [phone]    Schedule an appointment as soon as possible for a visit in 4 week(s)  Clinic follow up with ap lateral cervical spine xrays.    Te Comer M.D.  [address]  [facility] [address]  [phone]    Schedule an appointment as soon as possible for a visit      PCP    Schedule an appointment as soon as possible for a visit        TIME SPENT ON DISCHARGE: 35 minutes      ____________________________________________  FADI Albarran.    DD: 11/2/2022 12:19 PM

## 2022-11-03 VITALS
RESPIRATION RATE: 18 BRPM | HEART RATE: 95 BPM | TEMPERATURE: 97.2 F | BODY MASS INDEX: 25.9 KG/M2 | OXYGEN SATURATION: 93 % | DIASTOLIC BLOOD PRESSURE: 78 MMHG | SYSTOLIC BLOOD PRESSURE: 150 MMHG | HEIGHT: 71 IN | WEIGHT: 185 LBS

## 2022-11-03 PROCEDURE — A9270 NON-COVERED ITEM OR SERVICE: HCPCS | Performed by: SPECIALIST

## 2022-11-03 PROCEDURE — 700102 HCHG RX REV CODE 250 W/ 637 OVERRIDE(OP)

## 2022-11-03 PROCEDURE — 700102 HCHG RX REV CODE 250 W/ 637 OVERRIDE(OP): Performed by: SPECIALIST

## 2022-11-03 PROCEDURE — 700111 HCHG RX REV CODE 636 W/ 250 OVERRIDE (IP): Performed by: SURGERY

## 2022-11-03 PROCEDURE — A9270 NON-COVERED ITEM OR SERVICE: HCPCS

## 2022-11-03 RX ADMIN — METAXALONE 400 MG: 800 TABLET ORAL at 05:15

## 2022-11-03 RX ADMIN — ENOXAPARIN SODIUM 30 MG: 30 INJECTION SUBCUTANEOUS at 05:14

## 2022-11-03 RX ADMIN — OXYCODONE HYDROCHLORIDE 10 MG: 10 TABLET ORAL at 05:14

## 2022-11-03 RX ADMIN — POTASSIUM CHLORIDE 40 MEQ: 20 TABLET, EXTENDED RELEASE ORAL at 05:14

## 2022-11-03 RX ADMIN — AMLODIPINE BESYLATE 5 MG: 10 TABLET ORAL at 05:15

## 2022-11-03 RX ADMIN — GABAPENTIN 100 MG: 100 CAPSULE ORAL at 05:14

## 2022-11-03 RX ADMIN — FLUTICASONE PROPIONATE 100 MCG: 50 SPRAY, METERED NASAL at 05:14

## 2022-11-03 ASSESSMENT — PAIN DESCRIPTION - PAIN TYPE
TYPE: ACUTE PAIN

## 2022-11-03 NOTE — PROGRESS NOTES
Pt discharged education provided. Discharge paperwork signed. All belongings collected, pt medicated. Pt ride arrived, pt wheeled down to ride for [facility]. All belongings with patient, paperwork with pt.   Detail Level: Zone

## 2022-11-03 NOTE — PROGRESS NOTES
Received report from previous shift RN  Assessment complete.  A&O x 4. Patient calls appropriately.  Patient is upself. Bed alarm off.   Patient has 6/10 pain. Pain managed with prescribed medications.  Denies N&V. Tolerating soft and bite sized diet.  C collar in place.  Bilat forearm casts and dressings in place  + void, + flatus, + BM.  Patient denies SOB.  SCD's refused, pt ambulatory.    Review plan of care with patient. Call light and personal belongings with in reach. Hourly rounding in place. All needs met at this time.

## 2022-11-03 NOTE — CARE PLAN
The patient is Stable - Low risk of patient condition declining or worsening    Shift Goals  Clinical Goals: D/C  Patient Goals: Rest, D/C  Family Goals: N/A    Progress made toward(s) clinical / shift goals:  pt awaiting  from facility.    Patient is not progressing towards the following goals:

## 2022-11-03 NOTE — DOCUMENTATION QUERY
"                                                                         [facility]                                                                       Query Response Note      PATIENT:               EMELY HINES  ACCT #:                  [id number]  MRN:                     [id number]  :                      1960  ADMIT DATE:       10/13/2022 5:45 PM  DISCH DATE:        11/3/2022 7:05 AM  RESPONDING  PROVIDER #:        [id number]           QUERY TEXT:    Please clarify in documentation the relationship, if any, between UTI and tatum catheter.    The patient's Clinical Indicators include:  62 year old male admitted with extensive facial fractures, C3 fracture and bilateral wrist fractures after sustaining a fall while hiking. Patient is noted to require a tatum catheter due to retention. Discharge summary states \"He did suffer some urinary retention which has resolved.   He did suffer some leukocytosis.  Work-up was positive for a urinary tract infection.  He did complete Septra DS x7 days.\"    Clinical Indicators: WBC 15.5 on 10/14; 16.6 on 10/15, 13.3 on 10/16, 11.1 on 10/17, 8.8 on 10/21, increased to 17.9 on 10/25.  UA on 10/24: cloudy with moderate leukocytes and neg nitrites; micro few bacteria, 10-20 WBC  UA on 10/26: clear with small leukocytes and negative nitrites; micro moderate bacteria, 100-150 WBC  Tatum catheter placed on 10/14 with removal on 10/19 per record.     Risk Factors: Urinary retention, trauma, surgery    Treatment: Septra DS x 7 days    Thank you for your time and attention,     Shelly Araujo RN, CCDS, Lead CDIS   [email or web address]  Connect via VOALTE or Teams messenger  Options provided:   -- Urinary tract infection is due to or associated with tatum catheter   -- Urinary tract infection is NOT due to or associated with tatum catheter   -- Other explanation, please specify______   -- Unable to determine      Query created by: Shelly Araujo on 11/3/2022 " 5:23 AM    RESPONSE TEXT:    Urinary tract infection is NOT due to or associated with tatum catheter          Electronically signed by:  MICHAEL MOCK MD 11/3/2022 10:27 AM

## (undated) DEVICE — DRAPE LARGE 3 QUARTER - (20/CA)

## (undated) DEVICE — CANISTER SUCTION 3000ML MECHANICAL FILTER AUTO SHUTOFF MEDI-VAC NONSTERILE LF DISP  (40EA/CA)

## (undated) DEVICE — SODIUM CHL IRRIGATION 0.9% 1000ML (12EA/CA)

## (undated) DEVICE — PACK UPPER EXTREMITY (2EA/CA)

## (undated) DEVICE — NEEDLE NON SAFETY 25 GA X 1 1/2 IN HYPO (100EA/BX)

## (undated) DEVICE — SLEEVE, VASO, THIGH, MED

## (undated) DEVICE — GLOVE BIOGEL ECLIPSE PF LATEX SIZE 8.0  (50PR/BX)

## (undated) DEVICE — TOWELS CLOTH SURGICAL - (4/PK 20PK/CA)

## (undated) DEVICE — PADDING CAST 4 IN STERILE - 4 X 4 YDS (24/CA)

## (undated) DEVICE — GOWN WARMING STANDARD FLEX - (30/CA)

## (undated) DEVICE — SUTURE 3-0 VICRYL PLUS SH - 27 INCH (36/BX)

## (undated) DEVICE — SUTURE GENERAL

## (undated) DEVICE — SET LEADWIRE 5 LEAD BEDSIDE DISPOSABLE ECG (1SET OF 5/EA)

## (undated) DEVICE — SUTURE 3-0 CHROMIC GUT SH 27 (36PK/BX)"

## (undated) DEVICE — SUTURE PLASTIC

## (undated) DEVICE — GOWN SURGEONS LARGE - (32/CA)

## (undated) DEVICE — DRILL TWIST LONG 1.80MM (1TCONX2=2)

## (undated) DEVICE — COVER LIGHT HANDLE ALC PLUS DISP (18EA/BX)

## (undated) DEVICE — DRAPE ARM  BOX OF 20

## (undated) DEVICE — ELECTRODE DUAL RETURN W/ CORD - (50/PK)

## (undated) DEVICE — TOOTHBRUSH ADULT (72EA/CA)

## (undated) DEVICE — SYRINGE SAFETY TB 1 ML 27 GA X 1/2 IN (100/BX 4BX/CA)

## (undated) DEVICE — SUTURE 3-0 ETHILON FS-1 - (36/BX) 30 INCH

## (undated) DEVICE — NEEDLE NON SAFETY HYPO 22 GA X 1 1/2 IN (100/BX)

## (undated) DEVICE — SUTURE 4-0 VICRYL PLUS FS-2 - 27 INCH (36/BX)

## (undated) DEVICE — SUCTION INSTRUMENT YANKAUER BULBOUS TIP W/O VENT (50EA/CA)

## (undated) DEVICE — PAD PREP 24 X 48 CUFFED - (100/CA)

## (undated) DEVICE — GLOVE BIOGEL PI INDICATOR SZ 6.5 SURGICAL PF LF - (50/BX 4BX/CA)

## (undated) DEVICE — PAD LAP STERILE 18 X 18 - (5/PK 40PK/CA)

## (undated) DEVICE — Device

## (undated) DEVICE — GVL 3 STAT DISPOSABLE - (10/BX)

## (undated) DEVICE — SENSOR OXIMETER ADULT SPO2 RD SET (20EA/BX)

## (undated) DEVICE — SUTURE 2-0 ETHILON FS - (36/BX) 18 INCH

## (undated) DEVICE — GOWN SURGEONS X-LARGE - DISP. (30/CA)

## (undated) DEVICE — GLOVE SZ 6.5 BIOGEL PI MICRO - PF LF (50PR/BX)

## (undated) DEVICE — LACTATED RINGERS INJ 1000 ML - (14EA/CA 60CA/PF)

## (undated) DEVICE — DRAPE SURGICAL U 77X120 - (10/CA)

## (undated) DEVICE — SUTURE 3-0 VICRYL PLUS SH - 8X 18 INCH (12/BX)

## (undated) DEVICE — GLOVE BIOGEL SZ 7.5 SURGICAL PF LTX - (50PR/BX 4BX/CA)

## (undated) DEVICE — SET EXTENSION WITH 2 PORTS (48EA/CA) ***PART #2C8610 IS A SUBSTITUTE*****

## (undated) DEVICE — BLADE SURGICAL #11 - (50/BX)

## (undated) DEVICE — SUTURE 5-0 PLAIN GUT PC-1 - (12/BX)

## (undated) DEVICE — BLADE SURGICAL #15 - (50/BX 3BX/CA)

## (undated) DEVICE — WIRE KIRSCHNER 1.1 100MM (1TCONX12=12)

## (undated) DEVICE — TUBING CLEARLINK DUO-VENT - C-FLO (48EA/CA)

## (undated) DEVICE — BAG SPONGE COUNT 10.25 X 32 - BLUE (250/CA)

## (undated) DEVICE — DRAPE 36X28IN RAD CARM BND BG - (25/CA) O

## (undated) DEVICE — STOCKINET BIAS 4 IN STERILE - (20/CA)

## (undated) DEVICE — TOWEL STOP TIMEOUT SAFETY FLAG (40EA/CA)

## (undated) DEVICE — CHLORAPREP 26 ML APPLICATOR - ORANGE TINT(25/CA)

## (undated) DEVICE — ELECTRODE NEEDLE NON-SAFETY 2.8 IN (150EA/CT)

## (undated) DEVICE — PROTECTOR CORNEAL - (10/BX)

## (undated) DEVICE — SUTURE 4-0 ETHILON FS-2 18 (36PK/BX)"

## (undated) DEVICE — SPLINT PLASTER 4 IN  X 15 IN - (50/BX 12BX/CA)

## (undated) DEVICE — SYRINGE SAFETY 5 ML 18 GA X 1-1/2 BLUNT LL (100/BX 4BX/CA)

## (undated) DEVICE — DRAPE MAYO STAND - (30/CA)